# Patient Record
Sex: FEMALE | Race: WHITE | NOT HISPANIC OR LATINO | Employment: FULL TIME | URBAN - METROPOLITAN AREA
[De-identification: names, ages, dates, MRNs, and addresses within clinical notes are randomized per-mention and may not be internally consistent; named-entity substitution may affect disease eponyms.]

---

## 2017-04-17 ENCOUNTER — MYC MEDICAL ADVICE (OUTPATIENT)
Dept: FAMILY MEDICINE | Facility: CLINIC | Age: 52
End: 2017-04-17

## 2017-04-19 ENCOUNTER — TELEPHONE (OUTPATIENT)
Dept: FAMILY MEDICINE | Facility: CLINIC | Age: 52
End: 2017-04-19

## 2017-04-19 ENCOUNTER — OFFICE VISIT (OUTPATIENT)
Dept: FAMILY MEDICINE | Facility: CLINIC | Age: 52
End: 2017-04-19
Payer: COMMERCIAL

## 2017-04-19 ENCOUNTER — OFFICE VISIT (OUTPATIENT)
Dept: DERMATOLOGY | Facility: CLINIC | Age: 52
End: 2017-04-19

## 2017-04-19 ENCOUNTER — TRANSFERRED RECORDS (OUTPATIENT)
Dept: HEALTH INFORMATION MANAGEMENT | Facility: CLINIC | Age: 52
End: 2017-04-19

## 2017-04-19 VITALS
DIASTOLIC BLOOD PRESSURE: 80 MMHG | TEMPERATURE: 98.5 F | HEIGHT: 66 IN | HEART RATE: 72 BPM | SYSTOLIC BLOOD PRESSURE: 126 MMHG | BODY MASS INDEX: 21.86 KG/M2 | WEIGHT: 136 LBS

## 2017-04-19 DIAGNOSIS — L57.0 AK (ACTINIC KERATOSIS): Primary | ICD-10-CM

## 2017-04-19 DIAGNOSIS — Z13.6 CARDIOVASCULAR SCREENING; LDL GOAL LESS THAN 160: ICD-10-CM

## 2017-04-19 DIAGNOSIS — L70.0 ACNE VULGARIS: ICD-10-CM

## 2017-04-19 DIAGNOSIS — G43.809 OTHER TYPE OF MIGRAINE: ICD-10-CM

## 2017-04-19 DIAGNOSIS — Z11.59 NEED FOR HEPATITIS C SCREENING TEST: ICD-10-CM

## 2017-04-19 DIAGNOSIS — L81.2 FRECKLES: ICD-10-CM

## 2017-04-19 DIAGNOSIS — N95.1 MENOPAUSAL SYNDROME (HOT FLASHES): ICD-10-CM

## 2017-04-19 DIAGNOSIS — K64.9 HEMORRHOIDS, UNSPECIFIED HEMORRHOID TYPE: ICD-10-CM

## 2017-04-19 DIAGNOSIS — K21.9 GASTROESOPHAGEAL REFLUX DISEASE WITHOUT ESOPHAGITIS: ICD-10-CM

## 2017-04-19 DIAGNOSIS — Z00.00 ROUTINE HISTORY AND PHYSICAL EXAMINATION OF ADULT: Primary | ICD-10-CM

## 2017-04-19 LAB
ANION GAP SERPL CALCULATED.3IONS-SCNC: 7 MMOL/L (ref 3–14)
BUN SERPL-MCNC: 11 MG/DL (ref 7–30)
CALCIUM SERPL-MCNC: 9.2 MG/DL (ref 8.5–10.1)
CHLORIDE SERPL-SCNC: 108 MMOL/L (ref 94–109)
CHOLEST SERPL-MCNC: 247 MG/DL
CO2 SERPL-SCNC: 27 MMOL/L (ref 20–32)
CREAT SERPL-MCNC: 0.66 MG/DL (ref 0.52–1.04)
GFR SERPL CREATININE-BSD FRML MDRD: NORMAL ML/MIN/1.7M2
GLUCOSE SERPL-MCNC: 87 MG/DL (ref 70–99)
HCV AB SERPL QL IA: NORMAL
HDLC SERPL-MCNC: 69 MG/DL
HGB BLD-MCNC: 13.4 G/DL (ref 11.7–15.7)
LDLC SERPL CALC-MCNC: 158 MG/DL
NONHDLC SERPL-MCNC: 178 MG/DL
POTASSIUM SERPL-SCNC: 4.6 MMOL/L (ref 3.4–5.3)
SODIUM SERPL-SCNC: 142 MMOL/L (ref 133–144)
TRIGL SERPL-MCNC: 101 MG/DL

## 2017-04-19 PROCEDURE — 80061 LIPID PANEL: CPT | Performed by: FAMILY MEDICINE

## 2017-04-19 PROCEDURE — 86580 TB INTRADERMAL TEST: CPT | Performed by: FAMILY MEDICINE

## 2017-04-19 PROCEDURE — 85018 HEMOGLOBIN: CPT | Performed by: FAMILY MEDICINE

## 2017-04-19 PROCEDURE — 99396 PREV VISIT EST AGE 40-64: CPT | Performed by: FAMILY MEDICINE

## 2017-04-19 PROCEDURE — 36415 COLL VENOUS BLD VENIPUNCTURE: CPT | Performed by: FAMILY MEDICINE

## 2017-04-19 PROCEDURE — 80048 BASIC METABOLIC PNL TOTAL CA: CPT | Performed by: FAMILY MEDICINE

## 2017-04-19 PROCEDURE — 86803 HEPATITIS C AB TEST: CPT | Performed by: FAMILY MEDICINE

## 2017-04-19 RX ORDER — BENZOYL PEROXIDE 5 G/100G
GEL TOPICAL DAILY
Qty: 60 G | Refills: 2 | Status: SHIPPED | OUTPATIENT
Start: 2017-04-19 | End: 2018-04-17

## 2017-04-19 RX ORDER — OMEPRAZOLE 40 MG/1
40 CAPSULE, DELAYED RELEASE ORAL DAILY
Qty: 90 CAPSULE | Refills: 3 | Status: SHIPPED | OUTPATIENT
Start: 2017-04-19 | End: 2018-04-17

## 2017-04-19 RX ORDER — SUMATRIPTAN 25 MG/1
25 TABLET, FILM COATED ORAL
Qty: 9 TABLET | Refills: 5 | Status: SHIPPED | OUTPATIENT
Start: 2017-04-19 | End: 2018-02-18

## 2017-04-19 RX ORDER — DESONIDE 0.5 MG/G
OINTMENT TOPICAL
Qty: 30 G | Refills: 1 | Status: SHIPPED | OUTPATIENT
Start: 2017-04-19 | End: 2018-04-17

## 2017-04-19 RX ORDER — CIMETIDINE 400 MG
400 TABLET ORAL AT BEDTIME
Qty: 30 TABLET | Refills: 11 | Status: SHIPPED | OUTPATIENT
Start: 2017-04-19 | End: 2018-04-17

## 2017-04-19 ASSESSMENT — PAIN SCALES - GENERAL
PAINLEVEL: NO PAIN (0)
PAINLEVEL: NO PAIN (0)

## 2017-04-19 NOTE — PROGRESS NOTES
DERMATOLOGY PROBLEM LIST:   1.  Actinic keratoses.   2.  Family history of melanoma.      CHIEF COMPLAINT:  Skin check.      HISTORY OF PRESENT ILLNESS:  The patient is a 52-year-old female who returns today for her yearly skin check.  She has a history of actinic keratoses and sun damage.  She has responded well to cryotherapy in the past.  She has just in the past month returned from a tropical vacation in [place].  She did wear sunscreen but does report she does spend time at the beach.  Her father does have a history of melanoma on the face and has had what sounds like a nonmelanoma skin cancer of the back.  He follows with Dr. Aguilar.  At the last visit, she had 4 actinic keratoses treated with cryotherapy but no field treatment.      PHYSICAL EXAMINATION:  The patient is a well-appearing female in no acute distress.  Please note the nursing note for vital signs.  Face, trunk, upper and lower extremities including buttocks are examined today.  She is tanned but baseline Burt type I skin.  She has a rough, gritty papule on the left forehead.  Otherwise, she has multiple light brown macules  scattered throughout the body.      ASSESSMENT AND PLAN:   1.  Actinic keratoses.  She continues to improve each time we see her.  This visit, she only had 1 AK of the forehead and it was treated with cryotherapy.   2.  Nevi.  No atypia.   3.  Multiple freckles and lentigines.  Continue good sun protection.     I will plan to see her back in 1 year.

## 2017-04-19 NOTE — NURSING NOTE
"Chief Complaint   Patient presents with     Physical       Initial /80 (BP Location: Right arm, Cuff Size: Adult Regular)  Pulse 72  Temp 98.5  F (36.9  C) (Oral)  Ht 5' 5.5\" (1.664 m)  Wt 136 lb (61.7 kg)  BMI 22.29 kg/m2 Estimated body mass index is 22.29 kg/(m^2) as calculated from the following:    Height as of this encounter: 5' 5.5\" (1.664 m).    Weight as of this encounter: 136 lb (61.7 kg).  Medication Reconciliation: complete     Ava Sharp MA     "

## 2017-04-19 NOTE — TELEPHONE ENCOUNTER
Reason for Call:  Other - Prescription Clarification    Detailed comments: [facility] called and needs clarification on the prescription for: nitroglycerin (NITROGLYCERIN) 2 % OINT ointment. The directions stated to mix with 9 parts petroleum jelly. They are wondering if he wants the pharmacy to make a compound of this or if this is directions for the patient. Please call back to discuss.    Phone Number Patient can be reached at: Other phone number:  [phone]    Best Time: As soon as possible    Can we leave a detailed message on this number? YES    Call taken on 4/19/2017 at 2:36 PM by Sravanthi Panda

## 2017-04-19 NOTE — PATIENT INSTRUCTIONS
Preventive Health Recommendations  Female Ages 50 - 64    Yearly exam: See your health care provider every year in order to  o Review health changes.   o Discuss preventive care.    o Review your medicines if your doctor has prescribed any.      Get a Pap test every three years (unless you have an abnormal result and your provider advises testing more often).    If you get Pap tests with HPV test, you only need to test every 5 years, unless you have an abnormal result.     You do not need a Pap test if your uterus was removed (hysterectomy) and you have not had cancer.    You should be tested each year for STDs (sexually transmitted diseases) if you're at risk.     Have a mammogram every 1 to 2 years.    Have a colonoscopy at age 50, or have a yearly FIT test (stool test). These exams screen for colon cancer.      Have a cholesterol test every 5 years, or more often if advised.    Have a diabetes test (fasting glucose) every three years. If you are at risk for diabetes, you should have this test more often.     If you are at risk for osteoporosis (brittle bone disease), think about having a bone density scan (DEXA).    Shots: Get a flu shot each year. Get a tetanus shot every 10 years.    Nutrition:     Eat at least 5 servings of fruits and vegetables each day.    Eat whole-grain bread, whole-wheat pasta and brown rice instead of white grains and rice.    Talk to your provider about Calcium and Vitamin D.     Lifestyle    Exercise at least 150 minutes a week (30 minutes a day, 5 days a week). This will help you control your weight and prevent disease.    Limit alcohol to one drink per day.    No smoking.     Wear sunscreen to prevent skin cancer.     See your dentist every six months for an exam and cleaning.    See your eye doctor every 1 to 2 years.  Orders Placed This Encounter     TB INTRADERMAL TEST     Hepatitis C Screen Reflex to HCV RNA Quant and Genotype     The Hepatitis C Screen testing will be used for  patients age 45-65 following the CDC recommendations. HEP C screening testing can also be ordered for any patient for which it is clinically recommended.     Basic metabolic panel     Hemoglobin     Last Lab Result: Hemoglobin (g/dL)       Date                     Value                 04/19/2016               13.1             ----------     Lipid Profile with reflex to direct LDL     Last Lab Result: LDL Cholesterol Calculated (mg/dL)       Date                     Value                 04/19/2016               153 (H)          ----------     omeprazole (PRILOSEC) 40 MG capsule     Sig: Take 1 capsule (40 mg) by mouth daily     Dispense:  90 capsule     Refill:  3     cimetidine (TAGAMET) 400 MG tablet     Sig: Take 1 tablet (400 mg) by mouth At Bedtime     Dispense:  30 tablet     Refill:  11     desonide (DESOWEN) 0.05 % ointment     Sig: Apply thin layer to affected area BID.     Dispense:  30 g     Refill:  1     nitroglycerin (NITROGLYCERIN) 2 % OINT ointment     Sig: 3-4 times a day. Use with 9 parts petroleum and apply to hemmrhoids.     Dispense:  15 g     Refill:  3     estrogens, conjugated, (PREMARIN) 0.625 MG tablet     Sig: Take 1 tablet (0.625 mg) by mouth daily     Dispense:  90 tablet     Refill:  3     SUMAtriptan (IMITREX) 25 MG tablet     Sig: Take 1 tablet (25 mg) by mouth at onset of headache for migraine May repeat in 2 hours if needed: max 2/day     Dispense:  9 tablet     Refill:  5     benzoyl peroxide 5 % topical gel     Sig: Apply topically daily     Dispense:  60 g     Refill:  2

## 2017-04-19 NOTE — LETTER
4/19/2017       RE: Amanda Sifuentes  [address]  [place] [address]     Dear Colleague,    Thank you for referring your patient, Amanda Sifuentes, to the [facility] DERMATOLOGY at [facility]. Please see a copy of my visit note below.    DERMATOLOGY PROBLEM LIST:   1.  Actinic keratoses.   2.  Family history of melanoma.      CHIEF COMPLAINT:  Skin check.      HISTORY OF PRESENT ILLNESS:  The patient is a 52-year-old female who returns today for her yearly skin check.  She has a history of actinic keratoses and sun damage.  She has responded well to cryotherapy in the past.  She has just in the past month returned from a tropical vacation in [place].  She did wear sunscreen but does report she does spend time at the beach.  Her father does have a history of melanoma on the face and has had what sounds like a nonmelanoma skin cancer of the back.  He follows with Dr. Aguilar.  At the last visit, she had 4 actinic keratoses treated with cryotherapy but no field treatment.      PHYSICAL EXAMINATION:  The patient is a well-appearing female in no acute distress.  Please note the nursing note for vital signs.  Face, trunk, upper and lower extremities including buttocks are examined today.  She is tanned but baseline Burt type I skin.  She has a rough, gritty papule on the left forehead.  Otherwise, she has multiple light brown macules  scattered throughout the body.      ASSESSMENT AND PLAN:   1.  Actinic keratoses.  She continues to improve each time we see her.  This visit, she only had 1 AK of the forehead and it was treated with cryotherapy.   2.  Nevi.  No atypia.   3.  Multiple freckles and lentigines.  Continue good sun protection.     I will plan to see her back in 1 year.           Again, thank you for allowing me to participate in the care of your patient.      Sincerely,    Jaqueline Del Cid MD

## 2017-04-19 NOTE — NURSING NOTE
The patient is asked the following questions today and these are her answers:    -Have you had a mantoux administered in the past 30 days?    No  -Have you had a previous positive Mantoux.  No  -Have you received BCG in the past.  No  -Have you had a live vaccine  (MMR, Varicella, OPV, Yellow Fever) in the last 6 weeks.  No  -Have you had and active  viral or bacterial infection in the past 6 weeks.  No  -Have you received corticosteroids or immunosuppressive agents in the past 6 weeks.  No  -Have you been diagnosed with HIV?  No  -Do you have a maglinancy?  No   Alysha Marley, [facility]

## 2017-04-19 NOTE — MR AVS SNAPSHOT
After Visit Summary   4/19/2017    Amanda Sifuentes    MRN: [id number]           Patient Information     Date Of Birth          1965        Visit Information        Provider Department      4/19/2017 10:15 AM Jaqueline Del Cid MD [facility] Dermatology        Today's Diagnoses     AK (actinic keratosis)    -  1    Freckles          Care Instructions    Cryotherapy    What is it?    Use of a very cold liquid, such as liquid nitrogen, to freeze and destroy abnormal skin cells that need to be removed    What should I expect?    Tenderness and redness    A small blister that might grow and fill with dark purple blood. There may be crusting.    More than one treatment may be needed if the lesions do not go away.    How do I care for the treated area?    Gently wash the area with your hands when bathing.    Use a thin layer of Vaselin to help with healing. You may use a Band-Aid.     The area should heal within 7-10 days and may leave behind a pink or lighter color.     Do not use an antibiotic or Neosporin ointment.     You may take acetaminophen (Tylenol) for pain.     Call your Doctor if you have:    Severe pain    Signs of infection (warmth, redness, cloudy yellow drainage, and or a bad smell)    Questions or concerns    Who should I call with questions?       [facility]: [phone]       [facility]: [phone]       For urgent needs outside of business hours call the [facility] at [phone]        and ask for the dermatology resident on call        Follow-ups after your visit        Follow-up notes from your care team     Return in about 1 year (around 4/19/2018).      Your next 10 appointments already scheduled     Apr 21, 2017  9:00 AM CDT   Nurse Only with [place] RN   [facility]    [address] [facility] [facility] [address] [phone]            May 22,  2017  1:00 PM CDT   (Arrive by 12:30 PM)   RETURN ENDOCRINE with Kiersten Tenorio MD   [facility] ([facility] [place])    [address] [address] [phone]              Who to contact     Please call your clinic at [phone] to:    Ask questions about your health    Make or cancel appointments    Discuss your medicines    Learn about your test results    Speak to your doctor   If you have compliments or concerns about an experience at your clinic, or if you wish to file a complaint, please contact [facility] Patient Relations at [phone] or email us at [email or web address]         Additional Information About Your Visit        [employer] Information     [employer] gives you secure access to your electronic health record. If you see a primary care provider, you can also send messages to your care team and make appointments. If you have questions, please call your primary care clinic.  If you do not have a primary care provider, please call [phone] and they will assist you.      [employer] is an electronic gateway that provides easy, online access to your medical records. With [employer], you can request a clinic appointment, read your test results, renew a prescription or communicate with your care team.     To access your existing account, please contact your [facility] or call [phone] for assistance.        Care EveryWhere ID     This is your Care EveryWhere ID. This could be used by other organizations to access your [place] medical records  [id number]         Blood Pressure from Last 3 Encounters:   04/19/17 126/80   11/22/16 127/85   10/31/16 116/70    Weight from Last 3 Encounters:   04/19/17 61.7 kg (136 lb)   11/22/16 66.8 kg (147 lb 3.2 oz)   10/31/16 65.1 kg (143 lb 8 oz)              We Performed the Following     DESTRUCT PREMALIGNANT LESION, FIRST           Today's Medication Changes          These changes are accurate as of: 4/19/17 11:59 PM.  If you have any questions, ask your nurse or doctor.               These medicines have changed or have updated prescriptions.        Dose/Directions    SUMAtriptan 25 MG tablet   Commonly known as:  IMITREX   This may have changed:  additional instructions   Used for:  Other type of migraine   Changed by:  Rocco Carmona MD        Dose:  25 mg   Take 1 tablet (25 mg) by mouth at onset of headache for migraine May repeat in 2 hours if needed: max 2/day   Quantity:  9 tablet   Refills:  5            Where to get your medicines      These medications were sent to [place] [address] [facility] - [place], [place] - [address]  [address], [facility] [address]     Phone:  [phone]     benzoyl peroxide 5 % topical gel    cimetidine 400 MG tablet    desonide 0.05 % ointment    estrogens (conjugated) 0.625 MG tablet    nitroglycerin 2 % Oint ointment    omeprazole 40 MG capsule    SUMAtriptan 25 MG tablet                Primary Care Provider Office Phone # Fax #    Noreen Ventura [phone]       [address] [address]        Thank you!     Thank you for choosing [facility] DERMATOLOGY  for your care. Our goal is always to provide you with excellent care. Hearing back from our patients is one way we can continue to improve our services. Please take a few minutes to complete the written survey that you may receive in the mail after your visit with us. Thank you!             Your Updated Medication List - Protect others around you: Learn how to safely use, store and throw away your medicines at www.disposemymeds.org.          This list is accurate as of: 4/19/17 11:59 PM.  Always use your most recent med list.                   Brand Name Dispense Instructions for use    ascorbic acid 1000 MG Tabs    vitamin C     Take 1 tablet by mouth daily.       benzoyl  peroxide 5 % topical gel     60 g    Apply topically daily       Calcium Carb-Cholecalciferol 600-800 MG-UNIT Tabs      Dose unknown       cimetidine 400 MG tablet    TAGAMET    30 tablet    Take 1 tablet (400 mg) by mouth At Bedtime       desonide 0.05 % ointment    DESOWEN    30 g    Apply thin layer to affected area BID.       estrogens (conjugated) 0.625 MG tablet    PREMARIN    90 tablet    Take 1 tablet (0.625 mg) by mouth daily       GLUCOSAMINE CHONDROITIN Tabs     300 tab    TAKE 3 TABLETS (1500MG-GLUCOSAMINE) DAILY       Multi-vitamin Tabs tablet   Generic drug:  multivitamin, therapeutic with minerals     35    1 TABLET DAILY       nitroglycerin 2 % Oint ointment    nitroGLYCERIN    15 g    3-4 times a day. Use with 9 parts petroleum and apply to hemmrhoids.       omega-3 fatty acids 1200 MG capsule      Take 1 capsule by mouth daily.       omeprazole 40 MG capsule    priLOSEC    90 capsule    Take 1 capsule (40 mg) by mouth daily       SUMAtriptan 25 MG tablet    IMITREX    9 tablet    Take 1 tablet (25 mg) by mouth at onset of headache for migraine May repeat in 2 hours if needed: max 2/day       vitamin B complex with vitamin C Tabs tablet      Take 1 tablet by mouth daily       vitamin E 400 UNIT capsule      Take 1 capsule by mouth daily.

## 2017-04-19 NOTE — MR AVS SNAPSHOT
After Visit Summary   4/19/2017    Amanda Sifuentes    MRN: [id number]           Patient Information     Date Of Birth          1965        Visit Information        Provider Department      4/19/2017 8:00 AM Rocco Carmona MD [facility]        Today's Diagnoses     Routine history and physical examination of adult    -  1    CARDIOVASCULAR SCREENING; LDL GOAL LESS THAN 160        Need for hepatitis C screening test        Gastroesophageal reflux disease without esophagitis        Other type of migraine        Hemorrhoids, unspecified hemorrhoid type        Menopausal syndrome (hot flashes)        Acne vulgaris          Care Instructions      Preventive Health Recommendations  Female Ages 50 - 64    Yearly exam: See your health care provider every year in order to  o Review health changes.   o Discuss preventive care.    o Review your medicines if your doctor has prescribed any.      Get a Pap test every three years (unless you have an abnormal result and your provider advises testing more often).    If you get Pap tests with HPV test, you only need to test every 5 years, unless you have an abnormal result.     You do not need a Pap test if your uterus was removed (hysterectomy) and you have not had cancer.    You should be tested each year for STDs (sexually transmitted diseases) if you're at risk.     Have a mammogram every 1 to 2 years.    Have a colonoscopy at age 50, or have a yearly FIT test (stool test). These exams screen for colon cancer.      Have a cholesterol test every 5 years, or more often if advised.    Have a diabetes test (fasting glucose) every three years. If you are at risk for diabetes, you should have this test more often.     If you are at risk for osteoporosis (brittle bone disease), think about having a bone density scan (DEXA).    Shots: Get a flu shot each year. Get a tetanus shot every 10 years.    Nutrition:     Eat at least 5 servings of  fruits and vegetables each day.    Eat whole-grain bread, whole-wheat pasta and brown rice instead of white grains and rice.    Talk to your provider about Calcium and Vitamin D.     Lifestyle    Exercise at least 150 minutes a week (30 minutes a day, 5 days a week). This will help you control your weight and prevent disease.    Limit alcohol to one drink per day.    No smoking.     Wear sunscreen to prevent skin cancer.     See your dentist every six months for an exam and cleaning.    See your eye doctor every 1 to 2 years.          Follow-ups after your visit        Your next 10 appointments already scheduled     Apr 19, 2017 10:15 AM CDT   (Arrive by 10:00 AM)   Return Visit with Jaqueline Del Cid MD   [facility] ([facility])    [address] [place] [id number] [phone]            Apr 21, 2017  9:00 AM CDT   [facility] with [place] [facility]    [address] [place] [id number] [phone]            May 22, 2017  1:00 PM CDT   (Arrive by 12:30 PM)   RETURN ENDOCRINE with Kiersten Tenorio MD   [facility] ([facility])    [address] [place] [address] [phone]              Who to contact     If you have questions or need follow up information about today's clinic visit or your schedule please contact [facility] directly at [phone].  Normal or non-critical lab and imaging results will be communicated to you by MyChart, letter or phone within 4 business days after the clinic has received the results. If you do not hear from us within 7 days, please contact the clinic through Mfusehart or phone. If you have a critical or abnormal lab result, we will notify you by phone as soon as possible.  Submit refill requests through [employer] or call your pharmacy and they will forward the refill  "request to us. Please allow 3 business days for your refill to be completed.          Additional Information About Your Visit        Wappwolfhart Information     [employer] gives you secure access to your electronic health record. If you see a primary care provider, you can also send messages to your care team and make appointments. If you have questions, please call your primary care clinic.  If you do not have a primary care provider, please call [phone] and they will assist you.        Care EveryWhere ID     This is your Care EveryWhere ID. This could be used by other organizations to access your [facility] medical records  [id number]        Your Vitals Were     Pulse Temperature Height BMI (Body Mass Index)          72 98.5  F (36.9  C) (Oral) 5' 5.5\" (1.664 m) 22.29 kg/m2         Blood Pressure from Last 3 Encounters:   04/19/17 126/80   11/22/16 127/85   10/31/16 116/70    Weight from Last 3 Encounters:   04/19/17 136 lb (61.7 kg)   11/22/16 147 lb 3.2 oz (66.8 kg)   10/31/16 143 lb 8 oz (65.1 kg)              We Performed the Following     Basic metabolic panel     Hemoglobin     Hepatitis C Screen Reflex to HCV RNA Quant and Genotype     Lipid Profile with reflex to direct LDL     TB INTRADERMAL TEST          Today's Medication Changes          These changes are accurate as of: 4/19/17  8:38 AM.  If you have any questions, ask your nurse or doctor.               These medicines have changed or have updated prescriptions.        Dose/Directions    SUMAtriptan 25 MG tablet   Commonly known as:  IMITREX   This may have changed:  additional instructions   Used for:  Other type of migraine   Changed by:  Rocco Carmona MD        Dose:  25 mg   Take 1 tablet (25 mg) by mouth at onset of headache for migraine May repeat in 2 hours if needed: max 2/day   Quantity:  9 tablet   Refills:  5            Where to get your medicines      These medications were sent to [place] [address] [place] [address] [place]  [address] [facility] [place] [address]     Phone:  [phone]     benzoyl peroxide 5 % topical gel    cimetidine 400 MG tablet    desonide 0.05 % ointment    estrogens (conjugated) 0.625 MG tablet    nitroglycerin 2 % Oint ointment    omeprazole 40 MG capsule    SUMAtriptan 25 MG tablet                Primary Care Provider Office Phone # Fax #    Noreen Ventura [phone]       [facility] [address] [facility] [address]        Thank you!     Thank you for choosing [facility]  for your care. Our goal is always to provide you with excellent care. Hearing back from our patients is one way we can continue to improve our services. Please take a few minutes to complete the written survey that you may receive in the mail after your visit with us. Thank you!             Your Updated Medication List - Protect others around you: Learn how to safely use, store and throw away your medicines at www.disposemymeds.org.          This list is accurate as of: 4/19/17  8:38 AM.  Always use your most recent med list.                   Brand Name Dispense Instructions for use    ascorbic acid 1000 MG Tabs    vitamin C     Take 1 tablet by mouth daily.       benzoyl peroxide 5 % topical gel     60 g    Apply topically daily       Calcium Carb-Cholecalciferol 600-800 MG-UNIT Tabs      Dose unknown       cimetidine 400 MG tablet    TAGAMET    30 tablet    Take 1 tablet (400 mg) by mouth At Bedtime       desonide 0.05 % ointment    DESOWEN    30 g    Apply thin layer to affected area BID.       estrogens (conjugated) 0.625 MG tablet    PREMARIN    90 tablet    Take 1 tablet (0.625 mg) by mouth daily       GLUCOSAMINE CHONDROITIN Tabs     300 tab    TAKE 3 TABLETS (1500MG-GLUCOSAMINE) DAILY       Multi-vitamin Tabs tablet   Generic drug:  multivitamin, therapeutic with minerals     35    1 TABLET DAILY       nitroglycerin 2 % Oint ointment    nitroGLYCERIN    15 g     3-4 times a day. Use with 9 parts petroleum and apply to hemmrhoids.       omega-3 fatty acids 1200 MG capsule      Take 1 capsule by mouth daily.       omeprazole 40 MG capsule    priLOSEC    90 capsule    Take 1 capsule (40 mg) by mouth daily       SUMAtriptan 25 MG tablet    IMITREX    9 tablet    Take 1 tablet (25 mg) by mouth at onset of headache for migraine May repeat in 2 hours if needed: max 2/day       vitamin B complex with vitamin C Tabs tablet      Take 1 tablet by mouth daily       vitamin E 400 UNIT capsule      Take 1 capsule by mouth daily.

## 2017-04-19 NOTE — PROGRESS NOTES
SUBJECTIVE:     CC: Amanda Sifuentes is an 52 year old woman who presents for preventive health visit.     Heterogeneous thyroid. Reviewed notes from Endo dated 11/22/2016. Patient has a history of heterogeneous thyroid secondary to radiation exposure when she was a [employer] . She has a posterior to right thyroid cyst, which was noted to be enlarged at that time (believed to be extrathyroidal) and was recommended fine needle aspiration biopsy. Additionally, she has a history of elevated vitamin D in the past - levels were within normal limits (63) on 11/22/2016. She continues care with Dr. Tenorio.     Mammogram. History of heterogeneously dense breasts. Mammogram performed in 2016 showed no significant changes from previous mammograms.   Patient also does a monthly breast exam and hasn't noticed any changes or discharge. She has a mammogram scheduled for this year.    Colonoscopy. Colonoscopy done in 2013 showed a normal colon.     Acne. Patient follows up with Dr. Abdalla on a yearly interval. She denies any skin changes or mole changes.     GERD. Patient taking omeprazole once daily for symptomatic relief. She was instructed to continue medication as long as she is having symptoms. She did have an upper GI endoscopy in 2010 which showed a hiatal hernia.     Labs. LDL levels noted to be borderline high (153) in conjunction with good levels of HDL (68) in 2016. Patient states that she needs BMP, Hemoglobin, lipid, and mantoux test for work. She has never had a positive mantoux test, however [place] of the [place].    Past/recent records reviewed and discussed for -- medications and immunizations.      Healthy Habits:    Do you get at least three servings of calcium containing foods daily (dairy, green leafy vegetables, etc.)? yes    Amount of exercise or daily activities, outside of work: 4 day(s) per week    Problems taking medications regularly No    Medication side effects: No    Have  you had an eye exam in the past two years? yes    Do you see a dentist twice per year? yes    Do you have sleep apnea, excessive snoring or daytime drowsiness?no        Today's PHQ-2 Score: 0  PHQ-2 ( 1999 [employer]) 11/23/2015 2/9/2015   Q1: Little interest or pleasure in doing things 0 0   Q2: Feeling down, depressed or hopeless 0 0   PHQ-2 Score 0 0       Abuse: Current or Past(Physical, Sexual or Emotional)- No  Do you feel safe in your environment - Yes    Social History   Substance Use Topics     Smoking status: Never Smoker     Smokeless tobacco: Never Used     Alcohol use Yes      Comment: 3 drinks per wek      The patient does not drink >3 drinks per day nor >7 drinks per week.    Recent Labs   Lab Test  04/19/16   0759  02/09/15   0759  12/17/13   0843   CHOL  237*  244*  221*   HDL  68  70  65   LDL  153*  152*  131*   TRIG  80  112  126   CHOLHDLRATIO   --   3.5  3.4   NHDL  169*   --    --        Reviewed orders with patient.  Reviewed health maintenance and updated orders accordingly - Yes    Pertinent mammograms are reviewed under the imaging tab.  History of abnormal Pap smear: NO - age 30- 65 PAP every 5 years with negative HPV co-testing recommended    Reviewed and updated as needed this visit by clinical staff         Reviewed and updated as needed this visit by Provider          Problem list, Medication list, Allergies, and Medical/Social/Surgical histories reviewed in [facility] and updated as appropriate.    ROS:  C: NEGATIVE for fever, chills, change in weight  I: NEGATIVE for worrisome rashes, moles or lesions  E: NEGATIVE for vision changes or irritation  ENT: NEGATIVE for ear, mouth and throat problems  R: NEGATIVE for significant cough or SOB  B: NEGATIVE for masses, tenderness or discharge  CV: NEGATIVE for chest pain, palpitations or peripheral edema  GI: NEGATIVE for nausea, abdominal pain, heartburn, or change in bowel habits  : NEGATIVE for unusual urinary or vaginal symptoms. No vaginal  "bleeding.  M: NEGATIVE for significant arthralgias or myalgia  N: NEGATIVE for weakness, dizziness or paresthesias  P: NEGATIVE for changes in mood or affect     This document serves as a record of the services and decisions personally performed and made by Caio Carmona MD. It was created on their behalf by Aroldo Dela Cruz, a trained medical scribe. The creation of this document is based the provider's statements to the medical scribe.  Aroldo Dela Cruz April 19, 2017 8:08 AM         OBJECTIVE:     /80 (BP Location: Right arm, Cuff Size: Adult Regular)  Pulse 72  Temp 98.5  F (36.9  C) (Oral)  Ht 1.664 m (5' 5.5\")  Wt 61.7 kg (136 lb)  BMI 22.29 kg/m2  EXAM:  GENERAL: healthy, alert and no distress  EYES: Eyes grossly normal to inspection, PERRL and conjunctivae and sclerae normal  HENT: ear canals and TM's normal, nose and mouth without ulcers or lesions  NECK: no adenopathy, no asymmetry, masses, or scars and thyroid normal to palpation  RESP: lungs clear to auscultation - no rales, rhonchi or wheezes  CV: regular rate and rhythm, normal S1 S2, no S3 or S4, no murmur, click or rub, no peripheral edema and peripheral pulses strong  ABDOMEN: soft, nontender, no hepatosplenomegaly, no masses and bowel sounds normal  MS: no gross musculoskeletal defects noted, no edema  SKIN: no suspicious lesions or rashes  NEURO: Normal strength and tone, mentation intact and speech normal  PSYCH: mentation appears normal, affect normal/bright  LYMPH: no cervical, supraclavicular, axillary, or inguinal adenopathy    Breast exam and pelvic exam not performed    ASSESSMENT/PLAN:     (Z00.00) Routine history and physical examination of adult  (primary encounter diagnosis)  Comment: patient doing well. Patient needs blood workup for work including a mantoux test.   Plan: Basic metabolic panel, Hemoglobin, Lipid         Profile with reflex to direct LDL, TB         INTRADERMAL TEST        Follow up, pending results    (Z13.6) " CARDIOVASCULAR SCREENING; LDL GOAL LESS THAN 160  Comment: LDL level noted to be borderline high last time it was checked (2016).   Plan: Lipid Profile with reflex to direct LDL        Follow up, pending results    (Z11.59) Need for hepatitis C screening test  Comment: recommendations were discussed in accordance with general guidelines  Plan: Hepatitis C Screen Reflex to HCV RNA Quant and         Genotype        Follow up, pending results    (K21.9) Gastroesophageal reflux disease without esophagitis  Comment: upper GI endoscopy in 2010 showed hiatal hernia. I did expound upon some of the med's (omeprazole) side effects and that she should come back if she develops diarrhea.    Plan: omeprazole (PRILOSEC) 40 MG capsule        Continue present medications, medications refilled    (G43.809) Other type of migraine  Comment: stable  Plan: cimetidine (TAGAMET) 400 MG tablet, SUMAtriptan        (IMITREX) 25 MG tablet        -continue present medications, medications refilled    (K64.9) Hemorrhoids, unspecified hemorrhoid type  Comment: occasional attacks but tolerating medication well  Plan: nitroglycerin (NITROGLYCERIN) 2 % OINT ointment        Continue present medications, medications refilled    (N95.1) Menopausal syndrome (hot flashes)  Comment: controlled.  Plan: estrogens, conjugated, (PREMARIN) 0.625 MG         tablet        Continue present medications, medications refilled    (L70.0) Acne vulgaris  Comment: Patient follows up with Dr. Abdalla on a yearly interval.  Plan: desonide (DESOWEN) 0.05 % ointment, benzoyl         peroxide 5 % topical gel        -Continue care with Dr. Abdalla     Patient Instructions     Preventive Health Recommendations  Female Ages 50 - 64    Yearly exam: See your health care provider every year in order to  o Review health changes.   o Discuss preventive care.    o Review your medicines if your doctor has prescribed any.      Get a Pap test every three years (unless you have an abnormal  result and your provider advises testing more often).    If you get Pap tests with HPV test, you only need to test every 5 years, unless you have an abnormal result.     You do not need a Pap test if your uterus was removed (hysterectomy) and you have not had cancer.    You should be tested each year for STDs (sexually transmitted diseases) if you're at risk.     Have a mammogram every 1 to 2 years.    Have a colonoscopy at age 50, or have a yearly FIT test (stool test). These exams screen for colon cancer.      Have a cholesterol test every 5 years, or more often if advised.    Have a diabetes test (fasting glucose) every three years. If you are at risk for diabetes, you should have this test more often.     If you are at risk for osteoporosis (brittle bone disease), think about having a bone density scan (DEXA).    Shots: Get a flu shot each year. Get a tetanus shot every 10 years.    Nutrition:     Eat at least 5 servings of fruits and vegetables each day.    Eat whole-grain bread, whole-wheat pasta and brown rice instead of white grains and rice.    Talk to your provider about Calcium and Vitamin D.     Lifestyle    Exercise at least 150 minutes a week (30 minutes a day, 5 days a week). This will help you control your weight and prevent disease.    Limit alcohol to one drink per day.    No smoking.     Wear sunscreen to prevent skin cancer.     See your dentist every six months for an exam and cleaning.    See your eye doctor every 1 to 2 years.              COUNSELING:   Reviewed preventive health counseling, as reflected in patient instructions       Regular exercise       Healthy diet/nutrition       Consider Hep C screening for patients born between 1945 and 1965    BP Screening:   Last 3 BP Readings:    BP Readings from Last 3 Encounters:   04/19/17 126/80   11/22/16 127/85   10/31/16 116/70       The following was recommended to the patient:  Re-screen BP within a year and recommended lifestyle  "modifications     reports that she has never smoked. She has never used smokeless tobacco.  Estimated body mass index is 24.12 kg/(m^2) as calculated from the following:    Height as of 11/22/16: 5' 5.5\" (1.664 m).    Weight as of 11/22/16: 147 lb 3.2 oz (66.8 kg).       Counseling Resources:  ATP IV Guidelines  Pooled Cohorts Equation Calculator  Breast Cancer Risk Calculator  FRAX Risk Assessment  ICSI Preventive Guidelines  Dietary Guidelines for Americans, 2010  USDA's MyPlate  ASA Prophylaxis  Lung CA Screening    Rocco Carmona MD, MD  [facility]  "

## 2017-04-19 NOTE — PATIENT INSTRUCTIONS
Cryotherapy    What is it?    Use of a very cold liquid, such as liquid nitrogen, to freeze and destroy abnormal skin cells that need to be removed    What should I expect?    Tenderness and redness    A small blister that might grow and fill with dark purple blood. There may be crusting.    More than one treatment may be needed if the lesions do not go away.    How do I care for the treated area?    Gently wash the area with your hands when bathing.    Use a thin layer of Vaselin to help with healing. You may use a Band-Aid.     The area should heal within 7-10 days and may leave behind a pink or lighter color.     Do not use an antibiotic or Neosporin ointment.     You may take acetaminophen (Tylenol) for pain.     Call your Doctor if you have:    Severe pain    Signs of infection (warmth, redness, cloudy yellow drainage, and or a bad smell)    Questions or concerns    Who should I call with questions?       [facility]: [phone]       [facility]: [phone]       For urgent needs outside of business hours call the [facility] at [phone]        and ask for the dermatology resident on call

## 2017-04-19 NOTE — NURSING NOTE
"Dermatology Rooming Note    Amanda Sifuentes's goals for this visit include:   Chief Complaint   Patient presents with     Derm Problem     Amanda is here today for a skin check. She states \" no new concerns today\"           PAOLA Riggins    "

## 2017-04-20 NOTE — TELEPHONE ENCOUNTER
Patient was new to me. Please clarify with patient. I do think she mixes it herself.  It was a refill of her previous RX so they can refill how the previous was filled also    Caio Carmona MD

## 2017-04-21 ENCOUNTER — TELEPHONE (OUTPATIENT)
Dept: FAMILY MEDICINE | Facility: CLINIC | Age: 52
End: 2017-04-21

## 2017-04-21 ENCOUNTER — ALLIED HEALTH/NURSE VISIT (OUTPATIENT)
Dept: NURSING | Facility: CLINIC | Age: 52
End: 2017-04-21
Payer: COMMERCIAL

## 2017-04-21 DIAGNOSIS — Z11.1 SCREENING EXAMINATION FOR PULMONARY TUBERCULOSIS: Primary | ICD-10-CM

## 2017-04-21 LAB
PPDINDURATION: 0 MM (ref 0–5)
PPDREDNESS: 0 MM

## 2017-04-21 PROCEDURE — 99207 ZZC NO CHARGE NURSE ONLY: CPT

## 2017-04-21 NOTE — PROGRESS NOTES
Mantoux result:  Lab Results   Component Value Date    PPDREDNESS 0 04/21/2017    PPDINDURATIO 0 04/21/2017      Patient given result for work.     Yolande Hawthorne RN

## 2017-04-21 NOTE — TELEPHONE ENCOUNTER
Attempt # 1    Called patient at home number.     Was call answered?  No.  Left message on voicemail with information to call me back.    Yolande Hawthorne RN

## 2017-04-21 NOTE — LETTER
[address] [id number] [phone]      2017      Re: Amanda Sifuentes  : 1965  [address]  [place] [address]        To Whom It May Concern,    Mantoux TB skin test placed 17 and read 17 was negative (normal).     Component Results   Component Value Flag Ref Range Units Status Resulted Lab   PPD Induration 0  0 - 5 mm Final 2017  8:54 AM Unknown   PPD Redness 0   mm Final 2017  8:54 AM      Sincerely,          Yolande Hawthorne RN

## 2017-04-21 NOTE — MR AVS SNAPSHOT
After Visit Summary   4/21/2017    Amanda Sifuentes    MRN: [id number]           Patient Information     Date Of Birth          1965        Visit Information        Provider Department      4/21/2017 9:00 AM NE RN [facility]        Today's Diagnoses     Screening examination for pulmonary tuberculosis    -  1       Follow-ups after your visit        Your next 10 appointments already scheduled     May 22, 2017  1:00 PM CDT   (Arrive by 12:30 PM)   RETURN ENDOCRINE with Kiersten Tenorio MD   [facility] ([facility])    [address] [place] [id number] [phone]              Who to contact     If you have questions or need follow up information about today's clinic visit or your schedule please contact [facility] directly at [phone].  Normal or non-critical lab and imaging results will be communicated to you by MyChart, letter or phone within 4 business days after the clinic has received the results. If you do not hear from us within 7 days, please contact the clinic through MyChart or phone. If you have a critical or abnormal lab result, we will notify you by phone as soon as possible.  Submit refill requests through [employer] or call your pharmacy and they will forward the refill request to us. Please allow 3 business days for your refill to be completed.          Additional Information About Your Visit        MyChart Information     [employer] gives you secure access to your electronic health record. If you see a primary care provider, you can also send messages to your care team and make appointments. If you have questions, please call your primary care clinic.  If you do not have a primary care provider, please call [phone] and they will assist you.        Care EveryWhere ID     This is your Care EveryWhere ID. This could be used by other organizations to access your [facility]  records  [id number]         Blood Pressure from Last 3 Encounters:   04/19/17 126/80   11/22/16 127/85   10/31/16 116/70    Weight from Last 3 Encounters:   04/19/17 136 lb (61.7 kg)   11/22/16 147 lb 3.2 oz (66.8 kg)   10/31/16 143 lb 8 oz (65.1 kg)              Today, you had the following     No orders found for display       Primary Care Provider Office Phone # Fax #    Noreen Ventura [phone]       [facility] [address] [facility] [address]        Thank you!     Thank you for choosing [facility]  for your care. Our goal is always to provide you with excellent care. Hearing back from our patients is one way we can continue to improve our services. Please take a few minutes to complete the written survey that you may receive in the mail after your visit with us. Thank you!             Your Updated Medication List - Protect others around you: Learn how to safely use, store and throw away your medicines at www.disposemymeds.org.          This list is accurate as of: 4/21/17  9:53 AM.  Always use your most recent med list.                   Brand Name Dispense Instructions for use    ascorbic acid 1000 MG Tabs    vitamin C     Take 1 tablet by mouth daily.       benzoyl peroxide 5 % topical gel     60 g    Apply topically daily       Calcium Carb-Cholecalciferol 600-800 MG-UNIT Tabs      Dose unknown       cimetidine 400 MG tablet    TAGAMET    30 tablet    Take 1 tablet (400 mg) by mouth At Bedtime       desonide 0.05 % ointment    DESOWEN    30 g    Apply thin layer to affected area BID.       estrogens (conjugated) 0.625 MG tablet    PREMARIN    90 tablet    Take 1 tablet (0.625 mg) by mouth daily       GLUCOSAMINE CHONDROITIN Tabs     300 tab    TAKE 3 TABLETS (1500MG-GLUCOSAMINE) DAILY       Multi-vitamin Tabs tablet   Generic drug:  multivitamin, therapeutic with minerals     35    1 TABLET DAILY       nitroglycerin 2 % Oint  ointment    nitroGLYCERIN    15 g    3-4 times a day. Use with 9 parts petroleum and apply to hemmrhoids.       omega-3 fatty acids 1200 MG capsule      Take 1 capsule by mouth daily.       omeprazole 40 MG capsule    priLOSEC    90 capsule    Take 1 capsule (40 mg) by mouth daily       SUMAtriptan 25 MG tablet    IMITREX    9 tablet    Take 1 tablet (25 mg) by mouth at onset of headache for migraine May repeat in 2 hours if needed: max 2/day       vitamin B complex with vitamin C Tabs tablet      Take 1 tablet by mouth daily       vitamin E 400 UNIT capsule      Take 1 capsule by mouth daily.

## 2017-04-21 NOTE — TELEPHONE ENCOUNTER
Reason for Call:  Other prescription    Detailed comments: The pharmacy is calling wanting wanting to know if the nitroglycerin (NITROGLYCERIN) 2 % OINT ointment they need to combine it or if the patient needs to do it.     Phone Number Patient can be reached at: Home number on file [phone] (home)    Best Time: Anytime     Can we leave a detailed message on this number? YES    Call taken on 4/21/2017 at 3:56 PM by Zaira Gonzalez

## 2017-04-26 NOTE — TELEPHONE ENCOUNTER
Called and spoke with [facility]-he says that if we want them to compound it they need a recipe. Called patient and left VM to call back to see where it was filled previously to see if they have recipe.    Jorge Campuzano RN

## 2017-04-26 NOTE — TELEPHONE ENCOUNTER
Pharmacy calling to check on status of message.  Patient told them that she has never mixed it up herself.  Please call to advise/clarify.

## 2017-04-26 NOTE — TELEPHONE ENCOUNTER
Patient is returning call to nurse.    Patient has been out of town since 4/21/17 and just got back last night.    Patient has always gotten the script with it mixed by the pharmacy she does not mix it herself.    Patient had talked with [facility] pharmacy on 4/21/17 and they had asked her about whether she mixed her or not and she told them the pharmacy always has mixed it for her.    Patient had talked to Puma, pharmacist in training and explained it all to him.    Please call pharmacy and let them know they need to mix it for patient -- that is what they have always done in the past.    Please call patient ([phone]) once this has been done so she knows that this has been communicated to them.    Lacey King

## 2017-04-27 NOTE — TELEPHONE ENCOUNTER
Patient returns call requesting that we call [facility]. I apologized for the delay. I called [facility] & relayed the ingredient instructions & [facility] will mix it in a couple of hours & will call her when it's done. I called patient to inform her of this & patient verbalized understanding & thanks us for our help.  Bia Marley RN

## 2017-04-27 NOTE — TELEPHONE ENCOUNTER
Amanda returned call asking that she be called on her cell phone this morning [phone]. She needs a return call before 12:00 noon today. She stated that leaving a detailed message will not work because she needs to speak with someone.

## 2017-04-28 ENCOUNTER — TELEPHONE (OUTPATIENT)
Dept: FAMILY MEDICINE | Facility: CLINIC | Age: 52
End: 2017-04-28

## 2017-04-28 NOTE — TELEPHONE ENCOUNTER
[facility] [phone]  Pharmacy is calling to clarify if they need to mix the Nitroglycerin or Does the patient mix it?  Please call [facility] pharmacy to discuss.      Christi Liz  Patient Representative

## 2017-05-22 ENCOUNTER — OFFICE VISIT (OUTPATIENT)
Dept: ENDOCRINOLOGY | Facility: CLINIC | Age: 52
End: 2017-05-22

## 2017-05-22 VITALS
SYSTOLIC BLOOD PRESSURE: 127 MMHG | BODY MASS INDEX: 23.74 KG/M2 | HEART RATE: 73 BPM | WEIGHT: 142.5 LBS | HEIGHT: 65 IN | DIASTOLIC BLOOD PRESSURE: 79 MMHG

## 2017-05-22 DIAGNOSIS — E03.9 HYPOTHYROIDISM, UNSPECIFIED TYPE: ICD-10-CM

## 2017-05-22 DIAGNOSIS — E04.1 THYROID CYST: Primary | ICD-10-CM

## 2017-05-22 ASSESSMENT — PAIN SCALES - GENERAL: PAINLEVEL: NO PAIN (0)

## 2017-05-22 NOTE — LETTER
5/22/2017       RE: Amanda Sifuentes  [address]  [place] [address]     Dear Colleague,    Thank you for referring your patient, Amadna Sifuentes, to the [facility] ENDOCRINOLOGY at [facility]. Please see a copy of my visit note below.    Assessment / Plan  1. heterogeneous thyroid (with appearance of AITD)  in patient with adult age radiation exposures.  The nodules are small and none has characteristics of great concern.      2  Posterior to Right thyroid cyst has enlarged further -  -I am more convinced today that it is intrathyroidal and just a pure  cyst.  Repeat formal US neck and thyroid.    RTC 1 year.     3 History of possible irradiation exposure causing health risks. This would have been in her 20's. This may be a small risk factor for future head and neck tumors including of the thyroid or parathyroid -as per # 1 and # 2.  Both will continue to need to be watched periodically    Kiersten Tenorio MD.    HPI   Amanda presents for follow up of nodular thyroid in the context of history of ? 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  I last saw her 11/16.     We have been following her with periodic US and FNAB of thyroid nodules.    6/12/13 she had FNAB of the dominant right (# 3) and left dominant nodules (#3), as well as another left lobe nodule.  benign on all 3 nodules ([id number])    The most recent formal thyroid US was 3/13/15. When I saw her in clinic 11/15 I noted that the Anechoic cystic mass posterior to right inferior thyroid  Was larger, then 1.2 x 1.5 x 1.9 cm (2015 was 0.8 x 1 x 1.5 cm; 2013 was 0.5 x 0.6 x 0.6 cm) .  We did parathyroid related labs as I considered the possibility the cyst was a cystic parathyroid.  The parathyroid labs were normal.      We have the following tumor marker and antibody data:  10/21/09: TP0 < 10  11/1/10: IRIS < 20, calcitonin 3  3/13/15: Tg 13.8, IRIS < 0.4, TSH   11/22/16: Tg 11.7  (concurrent 11.3), IRIS < 0.4, PTH 51, vitamin D 63  4/19/17: Ca 9.2.     ROS   No awareness of nodule or thyroid.   No swallow or voice symptoms.   Feels normal   Cardiac, respiratory, GI: negative.     Family Hx    Family History   Problem Relation Age of Onset     DIABETES Maternal Grandfather      Cancer - colorectal Maternal Grandfather      70s     Hypertension Father      Prostate Cancer Father      CANCER Father      Basal Cell Carcinoma     CEREBROVASCULAR DISEASE Maternal Grandmother      Cancer - colorectal Maternal Grandmother      70s     Cardiovascular Mother      MI in her 70's(4 stents)     No known thyroid cancer    Personal Hx   Behavioral history: No tobacco use.   Home environment: No secondhand tobacco smoke in home.   .   PMH   Past Medical History:   Diagnosis Date     Esophageal reflux      Family history of colon cancer      Globus sensation      Multiple thyroid nodules     2009, 2013 FNAB benign     Other forms of migraine, without mention of intractable migraine without mention of status migrainosus      Unspecified hemorrhoids without mention of complication      Past Surgical History:   Procedure Laterality Date     COLONOSCOPY  5/20/2013    Procedure: COLONOSCOPY;;  Surgeon: Jim Flores MD;  Location: [facility]     HC EXCISION BREAST LESION, OPEN >=1      benign     HEMORRHOID SURGERY       HYSTERECTOMY, PAP STILL INDICATED      benign/with both ovaries(cervix in)         Current Outpatient Prescriptions   Medication Sig Dispense Refill     omeprazole (PRILOSEC) 40 MG capsule Take 1 capsule (40 mg) by mouth daily 90 capsule 3     cimetidine (TAGAMET) 400 MG tablet Take 1 tablet (400 mg) by mouth At Bedtime 30 tablet 11     desonide (DESOWEN) 0.05 % ointment Apply thin layer to affected area BID. 30 g 1     nitroglycerin (NITROGLYCERIN) 2 % OINT ointment 3-4 times a day. Use with 9 parts petroleum and apply to hemmrhoids. 15 g 3     estrogens, conjugated, (PREMARIN) 0.625 MG tablet  "Take 1 tablet (0.625 mg) by mouth daily 90 tablet 3     SUMAtriptan (IMITREX) 25 MG tablet Take 1 tablet (25 mg) by mouth at onset of headache for migraine May repeat in 2 hours if needed: max 2/day 9 tablet 5     benzoyl peroxide 5 % topical gel Apply topically daily 60 g 2     Calcium Carb-Cholecalciferol 600-800 MG-UNIT TABS Dose unknown       vitamin  B complex with vitamin C (VITAMIN  B COMPLEX) TABS Take 1 tablet by mouth daily       ascorbic acid (VITAMIN C) 1000 MG TABS Take 1 tablet by mouth daily.       vitamin E 400 UNIT capsule Take 1 capsule by mouth daily.       Omega-3 Fatty Acids (FISH OIL) 1200 MG capsule Take 1 capsule by mouth daily.       GLUCOSAMINE CHONDROITIN OR TABS TAKE 3 TABLETS (1500MG-GLUCOSAMINE) DAILY 300 tab 3     MULTI-VITAMIN OR TABS 1 TABLET DAILY 35 0   she is not on biotin      Physical Exam   GENERAL: thin middle aged woman in NAD   /79  Pulse 73  Ht 1.66 m (5' 5.35\")  Wt 64.6 kg (142 lb 8 oz)  BMI 23.46 kg/m2  SKIN: normal  color, normal temperature, texture   HEENT: PER, no scleral icterus, eyelid retraction, stare, lid lag, proptosis or conjunctival injection.   NECK: No visible neck masses, cervical adenopathy.  Thyroid is palpable on the right only, firm. I can't define nodule.  I have performed real time thyroid and neck US.  The right posterior dominant cystic mass is 1.5 x 1.4 x 2.2 cm (was 1.2 x 1.5 x 1.9 cm 11/16)  NEURO: Alert, moves all extremities,  gait normal,  .   DATA review:     Results for SHAISTA ROWLAND (MRN [id number]) as of 5/22/2017 08:58   Ref. Range 11/22/2016 17:27 4/19/2017 08:51   Sodium Latest Ref Range: 133 - 144 mmol/L  142   Potassium Latest Ref Range: 3.4 - 5.3 mmol/L  4.6   Chloride Latest Ref Range: 94 - 109 mmol/L  108   Carbon Dioxide Latest Ref Range: 20 - 32 mmol/L  27   Urea Nitrogen Latest Ref Range: 7 - 30 mg/dL  11   Creatinine Latest Ref Range: 0.52 - 1.04 mg/dL  0.66   GFR Estimate Latest Ref Range: >60 mL/min/1.7m2  " >90...   GFR Estimate If Black Latest Ref Range: >60 mL/min/1.7m2  >90...   Calcium Latest Ref Range: 8.5 - 10.1 mg/dL  9.2   Anion Gap Latest Ref Range: 3 - 14 mmol/L  7   Phosphorus Latest Ref Range: 2.5 - 4.5 mg/dL 3.5    Calcium Ionized Latest Ref Range: 4.4 - 5.2 mg/dL 4.8    Cholesterol Latest Ref Range: <200 mg/dL  247 (H)   HDL Cholesterol Latest Ref Range: >49 mg/dL  69   LDL Cholesterol Calculated Latest Ref Range: <100 mg/dL  158 (H)   Non HDL Cholesterol Latest Ref Range: <130 mg/dL  178 (H)   Triglycerides Latest Ref Range: <150 mg/dL  101   TSH Latest Ref Range: 0.40 - 4.00 mU/L 2.64    Vitamin D Deficiency screening Latest Ref Range: 20 - 75 ug/L 63      Results for SHAISTA ROWLAND (MRN [id number]) as of 5/22/2017 08:58   Ref. Range 11/22/2016 17:27   Parathyroid Hormone Intact Latest Ref Range: 12 - 72 pg/mL 51       Again, thank you for allowing me to participate in the care of your patient.      Sincerely,    Kiersten Tenorio MD

## 2017-05-22 NOTE — PROGRESS NOTES
Assessment / Plan  1. heterogeneous thyroid (with appearance of AITD)  in patient with adult age radiation exposures.  The nodules are small and none has characteristics of great concern.      2  Posterior to Right thyroid cyst has enlarged further -  -I am more convinced today that it is intrathyroidal and just a pure  cyst.  Repeat formal US neck and thyroid.    RTC 1 year.     3 History of possible irradiation exposure causing health risks. This would have been in her 20's. This may be a small risk factor for future head and neck tumors including of the thyroid or parathyroid -as per # 1 and # 2.  Both will continue to need to be watched periodically    Kiersten Tenorio MD.    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  I last saw her 11/16.     We have been following her with periodic US and FNAB of thyroid nodules.    6/12/13 she had FNAB of the dominant right (# 3) and left dominant nodules (#3), as well as another left lobe nodule.  benign on all 3 nodules ([id number])    The most recent formal thyroid US was 3/13/15. When I saw her in clinic 11/15 I noted that the Anechoic cystic mass posterior to right inferior thyroid  Was larger, then 1.2 x 1.5 x 1.9 cm (2015 was 0.8 x 1 x 1.5 cm; 2013 was 0.5 x 0.6 x 0.6 cm) .  We did parathyroid related labs as I considered the possibility the cyst was a cystic parathyroid.  The parathyroid labs were normal.      We have the following tumor marker and antibody data:  10/21/09: TP0 < 10  11/1/10: IRIS < 20, calcitonin 3  3/13/15: Tg 13.8, IRIS < 0.4, TSH   11/22/16: Tg 11.7 (concurrent 11.3), IRIS < 0.4, PTH 51, vitamin D 63  4/19/17: Ca 9.2.     Review of the image performed after the appt  5/25/17 thyroid and neck US:    Right # 1 0.8 x 0.4 x 0.9 cm , hyperechoic  (was 0.7 x 0.7 x  0.8 hyperechoic)  (was 0.8 x0.7 x 0.9 mixed)  Right # 2 cyst posterior 1.7 x  1.5 x 2.2 cm (was 0.8 x 1 x 1.5 cm   Right # 3 1 x 0.7 x 0.9 cm  (was 0.8 x0.7 x 0.9 mixed)    Left dense shadowing calcification 0.2 x 0.2 x 0.2 cm   Left # 2 0.5  X 0.3 x 0.7 cm  Left # 3  1.0 x 0.6 x 1.2 - posterior , hyperechoic with white margins (was 0.8 x0.7 x 1.0 cm )  Left # 4 0.6 x 0.4 x (was 0.5 x 0.3 x 0.7 cm)  No abnormal lymph nodes .    ROS   No awareness of nodule or thyroid.   No swallow or voice symptoms.   Feels normal   Cardiac, respiratory, GI: negative.     Family Hx    Family History   Problem Relation Age of Onset     DIABETES Maternal Grandfather      Cancer - colorectal Maternal Grandfather      70s     Hypertension Father      Prostate Cancer Father      CANCER Father      Basal Cell Carcinoma     CEREBROVASCULAR DISEASE Maternal Grandmother      Cancer - colorectal Maternal Grandmother      70s     Cardiovascular Mother      MI in her 70's(4 stents)     No known thyroid cancer    Personal Hx   Behavioral history: No tobacco use.   Home environment: No secondhand tobacco smoke in home.   .   PMH   Past Medical History:   Diagnosis Date     Esophageal reflux      Family history of colon cancer      Globus sensation      Multiple thyroid nodules     2009, 2013 FNAB benign     Other forms of migraine, without mention of intractable migraine without mention of status migrainosus      Unspecified hemorrhoids without mention of complication      Past Surgical History:   Procedure Laterality Date     COLONOSCOPY  5/20/2013    Procedure: COLONOSCOPY;;  Surgeon: Jim Flores MD;  Location: [facility]     HC EXCISION BREAST LESION, OPEN >=1      benign     HEMORRHOID SURGERY       HYSTERECTOMY, PAP STILL INDICATED      benign/with both ovaries(cervix in)         Current Outpatient Prescriptions   Medication Sig Dispense Refill     omeprazole (PRILOSEC) 40 MG capsule Take 1 capsule (40 mg) by mouth daily 90 capsule 3     cimetidine (TAGAMET) 400 MG tablet Take 1 tablet (400 mg) by mouth At  "Bedtime 30 tablet 11     desonide (DESOWEN) 0.05 % ointment Apply thin layer to affected area BID. 30 g 1     nitroglycerin (NITROGLYCERIN) 2 % OINT ointment 3-4 times a day. Use with 9 parts petroleum and apply to hemmrhoids. 15 g 3     estrogens, conjugated, (PREMARIN) 0.625 MG tablet Take 1 tablet (0.625 mg) by mouth daily 90 tablet 3     SUMAtriptan (IMITREX) 25 MG tablet Take 1 tablet (25 mg) by mouth at onset of headache for migraine May repeat in 2 hours if needed: max 2/day 9 tablet 5     benzoyl peroxide 5 % topical gel Apply topically daily 60 g 2     Calcium Carb-Cholecalciferol 600-800 MG-UNIT TABS Dose unknown       vitamin  B complex with vitamin C (VITAMIN  B COMPLEX) TABS Take 1 tablet by mouth daily       ascorbic acid (VITAMIN C) 1000 MG TABS Take 1 tablet by mouth daily.       vitamin E 400 UNIT capsule Take 1 capsule by mouth daily.       Omega-3 Fatty Acids (FISH OIL) 1200 MG capsule Take 1 capsule by mouth daily.       GLUCOSAMINE CHONDROITIN OR TABS TAKE 3 TABLETS (1500MG-GLUCOSAMINE) DAILY 300 tab 3     MULTI-VITAMIN OR TABS 1 TABLET DAILY 35 0   she is not on biotin      Physical Exam   GENERAL: thin middle aged woman in NAD   /79  Pulse 73  Ht 1.66 m (5' 5.35\")  Wt 64.6 kg (142 lb 8 oz)  BMI 23.46 kg/m2  SKIN: normal  color, normal temperature, texture   HEENT: PER, no scleral icterus, eyelid retraction, stare, lid lag, proptosis or conjunctival injection.   NECK: No visible neck masses, cervical adenopathy.  Thyroid is palpable on the right only, firm. I can't define nodule.  I have performed real time thyroid and neck US.  The right posterior dominant cystic mass is 1.5 x 1.4 x 2.2 cm (was 1.2 x 1.5 x 1.9 cm 11/16)  NEURO: Alert, moves all extremities,  gait normal,  .   DATA review:     Results for SHAISTA ROWLAND (MRN [id number]) as of 5/22/2017 08:58   Ref. Range 11/22/2016 17:27 4/19/2017 08:51   Sodium Latest Ref Range: 133 - 144 mmol/L  142   Potassium Latest Ref " Range: 3.4 - 5.3 mmol/L  4.6   Chloride Latest Ref Range: 94 - 109 mmol/L  108   Carbon Dioxide Latest Ref Range: 20 - 32 mmol/L  27   Urea Nitrogen Latest Ref Range: 7 - 30 mg/dL  11   Creatinine Latest Ref Range: 0.52 - 1.04 mg/dL  0.66   GFR Estimate Latest Ref Range: >60 mL/min/1.7m2  >90...   GFR Estimate If Black Latest Ref Range: >60 mL/min/1.7m2  >90...   Calcium Latest Ref Range: 8.5 - 10.1 mg/dL  9.2   Anion Gap Latest Ref Range: 3 - 14 mmol/L  7   Phosphorus Latest Ref Range: 2.5 - 4.5 mg/dL 3.5    Calcium Ionized Latest Ref Range: 4.4 - 5.2 mg/dL 4.8    Cholesterol Latest Ref Range: <200 mg/dL  247 (H)   HDL Cholesterol Latest Ref Range: >49 mg/dL  69   LDL Cholesterol Calculated Latest Ref Range: <100 mg/dL  158 (H)   Non HDL Cholesterol Latest Ref Range: <130 mg/dL  178 (H)   Triglycerides Latest Ref Range: <150 mg/dL  101   TSH Latest Ref Range: 0.40 - 4.00 mU/L 2.64    Vitamin D Deficiency screening Latest Ref Range: 20 - 75 ug/L 63      Results for SHAISTA ROWLAND (MRN [id number]) as of 5/22/2017 08:58   Ref. Range 11/22/2016 17:27   Parathyroid Hormone Intact Latest Ref Range: 12 - 72 pg/mL 51       EXAMINATION: US HEAD NECK SOFT TISSUE, 5/25/2017 7:34 AM      COMPARISON: None.     HISTORY: Thyroid nodule     FINDINGS:     Lymph nodes are measured bilaterally with measurements given in  craniocaudal, transverse and AP dimensions as follows:     Right:  Level 1: Negative  Level 2: Negative  Level 3: Negative  Level 4: Negative  Level 5: Negative  Level 6: Negative  Level 7 is not well evaluated by ultrasound.     Left:  Level 1: Negative  Level 2: Normal-appearing lymph node with a fatty hilum measuring 5 x  10 x 14 mm.  Level 3: Negative  Level 4: Negative  Level 5: Negative  Level 6: Negative  Level 7 is not well evaluated by ultrasound.         IMPRESSION:  Normal soft tissue neck ultrasound with lymph node measurements as  described above.     I have personally reviewed the examination and  initial interpretation  and I agree with the findings.     TEOFILO ROSENBAUM MD    EXAMINATION: US THYROID, 5/25/2017 7:34 AM      COMPARISON: 3/13/2015, 5/21/2013     HISTORY: Thyroid cyst     Technique: Grayscale and color ultrasound imaging of the thyroid was  performed.     Findings:    Thyroid parenchyma: Normal     The right lobe of the thyroid measures: 2.4 x 2.3 x 5.5 cm      The thyroid isthmus measures: 0.3 cm      The left lobe of the thyroid measures: 1.4 x 1.3 x 5.0 cm. Coarse  calcification in the superior pole is not associated with a nodule.      Right lobe:  Nodule 1: Upper pole  Nodule measurement: 8 x 4 x 9 mm , not significantly changed  Echogenicity: Hyperechoic  Consistency: solid  Calcifications: None  Hypervascular: yes  Interval growth (>20%): no     Nodule 2: Lower pole  Nodule measurement: 17 x 15 x 22 mm , previously 8 x 10 x 15 mm 2  years ago  Echogenicity: Anechoic  Consistency: cystic  Calcifications: None  Hypervascular: no  Interval growth (>20%): yes     Nodule 3: Lower pole  Nodule measurement: 10 x 7 x 9 mm , not significantly changed  Echogenicity: Mildly hypoechoic  Consistency: solid  Calcifications: no  Hypervascular: yes  Interval growth (>20%): no     Isthmus: None     Left Lobe:   Nodule 2: Mid  Nodule measurement: 5 x 3 x 7 mm in the mid gland , not significantly  changed  Echogenicity: Hypoechoic  Consistency: solid  Calcifications: None  Hypervascular: yes  Interval growth (>20%): no     Nodule 3: Lower pole  Nodule measurement: 10 x 6 x 12 mm , minimally increased  Echogenicity: Hypoechoic-anechoic  Consistency: Could be solid or cystic located deep with posterior  acoustic enhancement  Calcifications: None  Hypervascular: no  Interval growth (>20%): no     Nodule 4: Lower pole  Nodule measurement: 6 x 4 x 7 mm , unchanged  Echogenicity: Hypoechoic-anechoic  Consistency: Could be solid or cystic located deep with posterior  acoustic enhancement  Calcifications:  None  Hypervascular: no  Interval growth (>20%): no         Impression:  1. Relatively stable solid thyroid nodules.  2. Enlargement of an almost entirely cystic nodule in the right lobe.     I have personally reviewed the examination and initial interpretation  and I agree with the findings.     TEOFILO ROSENBAUM MD

## 2017-05-22 NOTE — NURSING NOTE
Chief Complaint   Patient presents with     RECHECK     F/U THYROID NODULE     Lesa Albert, CMA  Endocrinology & Diabetes 3G

## 2017-05-22 NOTE — MR AVS SNAPSHOT
After Visit Summary   2017    Amanda Sifuentes    MRN: [id number]           Patient Information     Date Of Birth          1965        Visit Information        Provider Department      2017 1:00 PM Kiersten Tenorio MD [facility]        Today's Diagnoses     Thyroid cyst    -  1      Care Instructions    IMAGING [phone]      To expedite your medication refill(s), please contact your pharmacy and have them fax a refill request to: [phone].  *Please allow 3 business days for routine medication refills.  *Please allow 5 business days for controlled substance medication refills.  --------------------  For scheduling appointments (including lab work), please request an appointment through Space Race, or call: [phone].    For questions for your provider or the endocrine nurse, please send a Space Race message.  For after-hours urgent issues, please dial [phone], and ask to speak with the Endocrinologist On-Call.  --------------------  Please Note: If you are active on Space Race, all future test results will be sent by Space Race message only and will no longer be sent by mail. You may also receive communication directly from your physician.          Follow-ups after your visit        Follow-up notes from your care team     Return in about 1 year (around 2018).      Future tests that were ordered for you today     Open Future Orders        Priority Expected Expires Ordered    US Thyroid Routine  2017    US head neck soft tissue Routine  2017            Who to contact     Please call your clinic at [phone] to:    Ask questions about your health    Make or cancel appointments    Discuss your medicines    Learn about your test results    Speak to your doctor   If you have compliments or concerns about an experience at your clinic, or if you wish to file a complaint, please contact [facility]  "Patient Relations at [phone] or email us at [email or web address]         Additional Information About Your Visit        MyChart Information     Kybernesis gives you secure access to your electronic health record. If you see a primary care provider, you can also send messages to your care team and make appointments. If you have questions, please call your primary care clinic.  If you do not have a primary care provider, please call [phone] and they will assist you.      Kybernesis is an electronic gateway that provides easy, online access to your medical records. With Kybernesis, you can request a clinic appointment, read your test results, renew a prescription or communicate with your care team.     To access your existing account, please contact your [facility] or call [phone] for assistance.        Care EveryWhere ID     This is your Care EveryWhere ID. This could be used by other organizations to access your [facility] medical records  [id number]        Your Vitals Were     Pulse Height BMI (Body Mass Index)             73 1.66 m (5' 5.35\") 23.46 kg/m2          Blood Pressure from Last 3 Encounters:   05/22/17 127/79   04/19/17 126/80   11/22/16 127/85    Weight from Last 3 Encounters:   05/22/17 64.6 kg (142 lb 8 oz)   04/19/17 61.7 kg (136 lb)   11/22/16 66.8 kg (147 lb 3.2 oz)               Primary Care Provider Office Phone # Fax #    Noreen Celestedavitari Lorenzo,  [phone]       [address] [address]        Thank you!     Thank you for choosing [facility]  for your care. Our goal is always to provide you with excellent care. Hearing back from our patients is one way we can continue to improve our services. Please take a few minutes to complete the written survey that you may receive in the mail after your visit with us. Thank you!             Your Updated Medication List - Protect " others around you: Learn how to safely use, store and throw away your medicines at www.disposemymeds.org.          This list is accurate as of: 5/22/17  1:26 PM.  Always use your most recent med list.                   Brand Name Dispense Instructions for use    ascorbic acid 1000 MG Tabs    vitamin C     Take 1 tablet by mouth daily.       benzoyl peroxide 5 % topical gel     60 g    Apply topically daily       Calcium Carb-Cholecalciferol 600-800 MG-UNIT Tabs      Dose unknown       cimetidine 400 MG tablet    TAGAMET    30 tablet    Take 1 tablet (400 mg) by mouth At Bedtime       desonide 0.05 % ointment    DESOWEN    30 g    Apply thin layer to affected area BID.       estrogens (conjugated) 0.625 MG tablet    PREMARIN    90 tablet    Take 1 tablet (0.625 mg) by mouth daily       GLUCOSAMINE CHONDROITIN Tabs     300 tab    TAKE 3 TABLETS (1500MG-GLUCOSAMINE) DAILY       Multi-vitamin Tabs tablet   Generic drug:  multivitamin, therapeutic with minerals     35    1 TABLET DAILY       nitroglycerin 2 % Oint ointment    nitroGLYCERIN    15 g    3-4 times a day. Use with 9 parts petroleum and apply to hemmrhoids.       omega-3 fatty acids 1200 MG capsule      Take 1 capsule by mouth daily.       omeprazole 40 MG capsule    priLOSEC    90 capsule    Take 1 capsule (40 mg) by mouth daily       SUMAtriptan 25 MG tablet    IMITREX    9 tablet    Take 1 tablet (25 mg) by mouth at onset of headache for migraine May repeat in 2 hours if needed: max 2/day       vitamin B complex with vitamin C Tabs tablet      Take 1 tablet by mouth daily       vitamin E 400 UNIT capsule      Take 1 capsule by mouth daily.

## 2017-05-22 NOTE — PATIENT INSTRUCTIONS
IMAGING [phone]      To expedite your medication refill(s), please contact your pharmacy and have them fax a refill request to: [phone].  *Please allow 3 business days for routine medication refills.  *Please allow 5 business days for controlled substance medication refills.  --------------------  For scheduling appointments (including lab work), please request an appointment through Taboola, or call: [phone].    For questions for your provider or the endocrine nurse, please send a Taboola message.  For after-hours urgent issues, please dial [phone], and ask to speak with the Endocrinologist On-Call.  --------------------  Please Note: If you are active on Taboola, all future test results will be sent by Taboola message only and will no longer be sent by mail. You may also receive communication directly from your physician.

## 2017-12-11 ENCOUNTER — OFFICE VISIT (OUTPATIENT)
Dept: FAMILY MEDICINE | Facility: CLINIC | Age: 52
End: 2017-12-11
Payer: COMMERCIAL

## 2017-12-11 VITALS
SYSTOLIC BLOOD PRESSURE: 124 MMHG | BODY MASS INDEX: 24.99 KG/M2 | HEART RATE: 70 BPM | HEIGHT: 65 IN | DIASTOLIC BLOOD PRESSURE: 72 MMHG | WEIGHT: 150 LBS | TEMPERATURE: 97.9 F

## 2017-12-11 DIAGNOSIS — F41.1 GAD (GENERALIZED ANXIETY DISORDER): Primary | ICD-10-CM

## 2017-12-11 PROCEDURE — 99214 OFFICE O/P EST MOD 30 MIN: CPT | Performed by: FAMILY MEDICINE

## 2017-12-11 RX ORDER — CITALOPRAM HYDROBROMIDE 20 MG/1
20 TABLET ORAL DAILY
Qty: 30 TABLET | Refills: 1 | Status: SHIPPED | OUTPATIENT
Start: 2017-12-11 | End: 2018-04-09

## 2017-12-11 ASSESSMENT — ANXIETY QUESTIONNAIRES
5. BEING SO RESTLESS THAT IT IS HARD TO SIT STILL: SEVERAL DAYS
IF YOU CHECKED OFF ANY PROBLEMS ON THIS QUESTIONNAIRE, HOW DIFFICULT HAVE THESE PROBLEMS MADE IT FOR YOU TO DO YOUR WORK, TAKE CARE OF THINGS AT HOME, OR GET ALONG WITH OTHER PEOPLE: VERY DIFFICULT
GAD7 TOTAL SCORE: 16
1. FEELING NERVOUS, ANXIOUS, OR ON EDGE: NEARLY EVERY DAY
7. FEELING AFRAID AS IF SOMETHING AWFUL MIGHT HAPPEN: MORE THAN HALF THE DAYS
6. BECOMING EASILY ANNOYED OR IRRITABLE: NEARLY EVERY DAY
3. WORRYING TOO MUCH ABOUT DIFFERENT THINGS: NEARLY EVERY DAY
2. NOT BEING ABLE TO STOP OR CONTROL WORRYING: MORE THAN HALF THE DAYS

## 2017-12-11 ASSESSMENT — PATIENT HEALTH QUESTIONNAIRE - PHQ9
5. POOR APPETITE OR OVEREATING: MORE THAN HALF THE DAYS
SUM OF ALL RESPONSES TO PHQ QUESTIONS 1-9: 7

## 2017-12-11 NOTE — MR AVS SNAPSHOT
After Visit Summary   12/11/2017    Amanda Sifuentes    MRN: [id number]           Patient Information     Date Of Birth          1965        Visit Information        Provider Department      12/11/2017 7:40 AM Fadi Cabrera MD [facility]        Today's Diagnoses     ITALO (generalized anxiety disorder)    -  1      Care Instructions    [facility]   Discharged by : mary  Paper scripts provided to patient : none     If you have any questions regarding your visit please contact your care team:     Team Gold                Clinic Hours Telephone Number     Dr. Jennifer Soto 7am-7pm  Monday - Thursday   7am-5pm  Fridays  [phone]   (Appointment scheduling available 24/7)     RN Line  [phone] option 2     Urgent Care - [place] and [facility] - 11am-9pm Monday-Friday Saturday-Sunday- 9am-5pm     [place] -   5pm-9pm Monday-Friday Saturday-Sunday- 9am-5pm    [phone] - [place]    [phone] - [place]       For a Price Quote for your services, please call our Consumer Price Line at [phone].     What options do I have for visits at the clinic other than the traditional office visit?     To expand how we care for you, many of our providers are utilizing electronic visits (e-visits) and telephone visits, when medically appropriate, for interactions with their patients rather than a visit in the clinic. We also offer nurse visits for many medical concerns. Just like any other service, we will bill your insurance company for this type of visit based on time spent on the phone with your provider. Not all insurance companies cover these visits. Please check with your medical insurance if this type of visit is covered. You will be responsible for any charges that are not paid by your insurance.   E-visits via Swapper Trade:  generally incur a $35.00 fee.     Telephone visits:   Time spent on the phone: *charged based on time that is spent on the phone in increments of 10 minutes. Estimated cost:   5-10 mins $30.00   11-20 mins. $59.00   21-30 mins. $85.00     Use Elasterahart (secure email communication and access to your chart) to send your primary care provider a message or make an appointment. Ask someone on your Team how to sign up for [employer] Rally[employer] Championshipt.     As always, Thank you for trusting us with your health care needs!      [facility]:    Scheduling Appointments  [facility]  Call: [phone]    [facility]  Call: [phone]    [facility]  Call: [phone]    For Gastroenterology referrals   [facility]   Clinics and Surgery Center, 4th Floor   [address] [place] [address]   Appointments: [phone]    WHERE TO GO FOR CARE?  Clinic    Make an appointment if you:       Are sick (cold, cough, flu, sore throat, earache or in pain).       Have a small injury (sprain, small cut, burn or broken bone).       Need a physical exam, Pap smear, vaccine or prescription refill.       Have questions about your health or medicines.    To reach us:      Call [phone] ([phone]). Open 24 hours every day. (For counseling services, call [phone].)    Log into [employer] at [email or web address]. (Visit [email or web address] to create an account.) Hospital emergency room    An emergency is a serious or life- threatening problem that must be treated right away.    Call 888 or get to the hospital if you have:      Very bad or sudden:            - Chest pain or pressure         - Bleeding         - Head or belly pain         - Dizziness or trouble seeing, walking or                          Speaking      Problems breathing      Blood in your vomit or you are coughing up blood      A major injury (knocked out, loss of a finger  or limb, rape, broken bone protruding from skin)    A mental health crisis. (Or call the Mental Health Crisis line at 1-335.248.7399 or Suicide Prevention Hotline at 1-962.527.2451.)    Open 24 hours every day. You don't need an appointment.     Urgent care    Visit urgent care for sickness or small injuries when the clinic is closed. You don't need an appointment. To check hours or find an urgent care near you, visit [email or web address]. Online care    Get online care from OnCJiglu for more than 70 common problems, like colds, allergies and infections. Open 24 hours every day at:   [email or web address]   Need help deciding?    For advice about where to be seen, you may call your clinic and ask to speak with a nurse. We're here for you 24 hours every day.         If you are deaf or hard of hearing, please let us know. We provide many free services including sign language interpreters, oral interpreters, TTYs, telephone amplifiers, note takers and written materials.                   Follow-ups after your visit        Additional Services     MENTAL HEALTH REFERRAL  - Adult; Outpatient Treatment; Individual/Couples/Family/Group Therapy/Health Psychology; Other: Behavioral Healthcare Providers [phone]; We will contact you to schedule the appointment or please call with any questi...       All scheduling is subject to the client's specific insurance plan & benefits, provider/location availability, and provider clinical specialities.  Please arrive 15 minutes early for your first appointment and bring your completed paperwork.    Please be aware that coverage of these services is subject to the terms and limitations of your health insurance plan.  Call member services at your health plan with any benefit or coverage questions.                            Follow-up notes from your care team     Return in about 3 weeks (around 1/1/2018).      Your next 10 appointments already scheduled     May 07, 2018  4:40 PM CDT   (Arrive by  "4:25 PM)   RETURN ENDOCRINE with Kiersten Tenorio MD   [facility] ([facility] [place])    [address]  [address] [facility] [address] [phone]              Who to contact     If you have questions or need follow up information about today's clinic visit or your schedule please contact [facility] directly at [phone].  Normal or non-critical lab and imaging results will be communicated to you by Wanovahart, letter or phone within 4 business days after the clinic has received the results. If you do not hear from us within 7 days, please contact the clinic through [employer] AGt or phone. If you have a critical or abnormal lab result, we will notify you by phone as soon as possible.  Submit refill requests through Agistics or call your pharmacy and they will forward the refill request to us. Please allow 3 business days for your refill to be completed.          Additional Information About Your Visit        WanovaharTimehop Information     Agistics gives you secure access to your electronic health record. If you see a primary care provider, you can also send messages to your care team and make appointments. If you have questions, please call your primary care clinic.  If you do not have a primary care provider, please call [phone] and they will assist you.        Care EveryWhere ID     This is your Care EveryWhere ID. This could be used by other organizations to access your [place] medical records  [id number]        Your Vitals Were     Pulse Temperature Height BMI (Body Mass Index)          70 97.9  F (36.6  C) (Oral) 5' 5.35\" (1.66 m) 24.69 kg/m2         Blood Pressure from Last 3 Encounters:   12/11/17 124/72   05/22/17 127/79   04/19/17 126/80    Weight from Last 3 Encounters:   12/11/17 150 lb (68 kg)   05/22/17 142 lb 8 oz (64.6 kg)   04/19/17 136 lb (61.7 kg)              We Performed the Following     MENTAL HEALTH REFERRAL  - Adult; Outpatient " Treatment; Individual/Couples/Family/Group Therapy/Health Psychology; Other: Behavioral Healthcare Providers [phone]; We will contact you to schedule the appointment or please call with any questi...          Today's Medication Changes          These changes are accurate as of: 12/11/17  8:25 AM.  If you have any questions, ask your nurse or doctor.               Start taking these medicines.        Dose/Directions    citalopram 20 MG tablet   Commonly known as:  celeXA   Used for:  ITALO (generalized anxiety disorder)   Started by:  Fadi Cabrera MD        Dose:  20 mg   Take 1 tablet (20 mg) by mouth daily   Quantity:  30 tablet   Refills:  1            Where to get your medicines      These medications were sent to [place] [address] [facility] [place] - [address]  [address] [place] [address]     Phone:  [phone]     citalopram 20 MG tablet                Primary Care Provider Office Phone # Fax #    Noreen Booker Lorenzo,  [phone]       [address] [place] [address]        Equal Access to Services     [facility]: Hadii aad ku hadasho Soomaali, waaxda luqadaha, qaybta kaalmada adeegyada, alonzo luu . So [facility] [phone].    ATENCIÓN: Si habla español, tiene a rabago disposición servicios gratuitos de asistencia lingüística. Pily al [phone].    We comply with applicable federal civil rights laws and [place] laws. We do not discriminate on the basis of race, color, national origin, age, disability, sex, sexual orientation, or gender identity.            Thank you!     Thank you for choosing [facility]  for your care. Our goal is always to provide you with excellent care. Hearing back from our patients is one way we can continue to improve our services. Please take a few minutes to complete the written survey that you may receive in the mail after your visit with us. Thank you!             Your  Updated Medication List - Protect others around you: Learn how to safely use, store and throw away your medicines at www.disposemymeds.org.          This list is accurate as of: 12/11/17  8:25 AM.  Always use your most recent med list.                   Brand Name Dispense Instructions for use Diagnosis    ascorbic acid 1000 MG Tabs    vitamin C     Take 1 tablet by mouth daily.        benzoyl peroxide 5 % topical gel     60 g    Apply topically daily    Acne vulgaris       Calcium Carb-Cholecalciferol 600-800 MG-UNIT Tabs      Dose unknown    High serum parathyroid hormone (PTH), History of irradiation, presenting hazards to health       cimetidine 400 MG tablet    TAGAMET    30 tablet    Take 1 tablet (400 mg) by mouth At Bedtime    Other type of migraine       citalopram 20 MG tablet    celeXA    30 tablet    Take 1 tablet (20 mg) by mouth daily    ITALO (generalized anxiety disorder)       desonide 0.05 % ointment    DESOWEN    30 g    Apply thin layer to affected area BID.    Acne vulgaris       estrogens (conjugated) 0.625 MG tablet    PREMARIN    90 tablet    Take 1 tablet (0.625 mg) by mouth daily    Menopausal syndrome (hot flashes)       GLUCOSAMINE CHONDROITIN Tabs     300 tab    TAKE 3 TABLETS (1500MG-GLUCOSAMINE) DAILY        Multi-vitamin Tabs tablet   Generic drug:  multivitamin, therapeutic with minerals     35    1 TABLET DAILY    OTC -PATIENT CHOICE       nitroGLYcerin 2 % Oint ointment    NITRO-BID    15 g    3-4 times a day. Use with 9 parts petroleum and apply to hemmrhoids.    Hemorrhoids, unspecified hemorrhoid type       omega-3 fatty acids 1200 MG capsule      Take 1 capsule by mouth daily.        omeprazole 40 MG capsule    priLOSEC    90 capsule    Take 1 capsule (40 mg) by mouth daily    Gastroesophageal reflux disease without esophagitis       SUMAtriptan 25 MG tablet    IMITREX    9 tablet    Take 1 tablet (25 mg) by mouth at onset of headache for migraine May repeat in 2 hours if needed:  max 2/day    Other type of migraine       vitamin B complex with vitamin C Tabs tablet      Take 1 tablet by mouth daily        vitamin E 400 UNIT capsule      Take 1 capsule by mouth daily.

## 2017-12-11 NOTE — PATIENT INSTRUCTIONS
[facility]   Discharged by : mary  Paper scripts provided to patient : none     If you have any questions regarding your visit please contact your care team:     Team Gold                Clinic Hours Telephone Number     Dr. Jennifer Soto 7am-7pm  Monday - Thursday   7am-5pm  Fridays  [phone]   (Appointment scheduling available 24/7)     RN Line  [phone] option 2     [facility] [place] [facility] 11am-9pm Monday-Friday Saturday-Sunday- 9am-5pm     [place] -   5pm-9pm Monday-Friday Saturday-Sunday- 9am-5pm    [phone] - [place]    [phone] - [place]       For a Price Quote for your services, please call our Ther[employer] Price Line at [phone].     What options do I have for visits at the clinic other than the traditional office visit?     To expand how we care for you, many of our providers are utilizing electronic visits (e-visits) and telephone visits, when medically appropriate, for interactions with their patients rather than a visit in the clinic. We also offer nurse visits for many medical concerns. Just like any other service, we will bill your insurance company for this type of visit based on time spent on the phone with your provider. Not all insurance companies cover these visits. Please check with your medical insurance if this type of visit is covered. You will be responsible for any charges that are not paid by your insurance.   E-visits via [employer]: generally incur a $35.00 fee.     Telephone visits:   Time spent on the phone: *charged based on time that is spent on the phone in increments of 10 minutes. Estimated cost:   5-10 mins $30.00   11-20 mins. $59.00   21-30 mins. $85.00     Use [employer] (secure email communication and access to your chart) to send your primary care provider a message or make an appointment. Ask someone on your  Team how to sign up for Yurpy.     As always, Thank you for trusting us with your health care needs!      [facility]:    Scheduling Appointments  [place]  Call: [phone]    Abilio Chavez [facility]  Call: [phone]    [facility]  Call: [phone]    For Gastroenterology referrals   [facility] Gastroenterology   Clinics and Surgery Center, 4th Floor   [address] [place] [address]   Appointments: [phone]    WHERE TO GO FOR CARE?  Clinic    Make an appointment if you:       Are sick (cold, cough, flu, sore throat, earache or in pain).       Have a small injury (sprain, small cut, burn or broken bone).       Need a physical exam, Pap smear, vaccine or prescription refill.       Have questions about your health or medicines.    To reach us:      Call [phone] ([phone]). Open 24 hours every day. (For counseling services, call [phone].)    Log into Yurpy at [email or web address]. (Visit [email or web address] to create an account.) Hospital emergency room    An emergency is a serious or life- threatening problem that must be treated right away.    Call 041 or get to the hospital if you have:      Very bad or sudden:            - Chest pain or pressure         - Bleeding         - Head or belly pain         - Dizziness or trouble seeing, walking or                          Speaking      Problems breathing      Blood in your vomit or you are coughing up blood      A major injury (knocked out, loss of a finger or limb, rape, broken bone protruding from skin)    A mental health crisis. (Or call the Mental Health Crisis line at 1-856.164.5845 or Suicide Prevention Hotline at 1-199.463.3290.)    Open 24 hours every day. You don't need an appointment.     Urgent care    Visit urgent care for sickness or small injuries when the clinic is closed. You don't need an appointment. To check hours or find an  urgent care near you, visit [email or web address]. Online care    Get online care from OnCare for more than 70 common problems, like colds, allergies and infections. Open 24 hours every day at:   [email or web address]   Need help deciding?    For advice about where to be seen, you may call your clinic and ask to speak with a nurse. We're here for you 24 hours every day.         If you are deaf or hard of hearing, please let us know. We provide many free services including sign language interpreters, oral interpreters, TTYs, telephone amplifiers, note takers and written materials.

## 2017-12-11 NOTE — NURSING NOTE
"Chief Complaint   Patient presents with     Anxiety     new problem       Initial /72  Pulse 70  Temp 97.9  F (36.6  C) (Oral)  Ht 5' 5.35\" (1.66 m)  Wt 150 lb (68 kg)  BMI 24.69 kg/m2 Estimated body mass index is 24.69 kg/(m^2) as calculated from the following:    Height as of this encounter: 5' 5.35\" (1.66 m).    Weight as of this encounter: 150 lb (68 kg).  Medication Reconciliation: complete   Lana Wilder MA      "

## 2017-12-11 NOTE — PROGRESS NOTES
SUBJECTIVE:   Amanda Sifuentes is a 52 year old female who presents to clinic today for the following health issues:      Anxiety Follow-Up    Status since last visit: new problem, a lot going on in her life right now. Never been treated with medications in the past    Other associated symptoms:None    Complicating factors:   Significant life event: No   Current substance abuse: None  Depression symptoms: unsure  ITALO-7 SCORE 12/11/2017   Total Score 16     PHQ-9 SCORE 12/11/2017   Total Score 7       GAD7          Amount of exercise or physical activity: was doing 4 times weekly, and now hazel to get one day a week in    Problems taking medications regularly: No    Medication side effects: none    Diet: regular (no restrictions)    She has noted that self cares such as exercise have helped but less time and energy to engage in those at this time.  Work is a major source of stress and working 70 hours per week at this time.  Wakes up middle of night and unable to fall back to sleep due to work concerns.  Not sure how comfortable she is with medication at this time.  Not sure about time commitment to counseling either due to her busy schedule at this time.        Problem list and histories reviewed & adjusted, as indicated.  Additional history: as documented    Patient Active Problem List   Diagnosis     Hemorrhoids     Other type of migraine     Esophageal reflux     Other acne     CARDIOVASCULAR SCREENING; LDL GOAL LESS THAN 160     Family history of colon cancer     History of hysterectomy for benign disease     Eczema     Poikiloderma of [place]     Solar elastosis     AK (actinic keratosis)     Hyperlipidemia LDL goal <160     Hormone replacement therapy (postmenopausal)     Actinic keratosis     Porokeratosis     Double cervix     Thyroid with heterogeneous echotexture determined by ultrasound     Thyroid cyst     History of irradiation, presenting hazards to health     Hypothyroidism, unspecified type      Past Surgical History:   Procedure Laterality Date     COLONOSCOPY  5/20/2013    Procedure: COLONOSCOPY;;  Surgeon: Jim Flores MD;  Location: [facility]     HC EXCISION BREAST LESION, OPEN >=1      benign     HEMORRHOID SURGERY       HYSTERECTOMY, PAP STILL INDICATED      benign/with both ovaries(cervix in)       Social History   Substance Use Topics     Smoking status: Never Smoker     Smokeless tobacco: Never Used     Alcohol use Yes      Comment: 3 drinks per wek      Family History   Problem Relation Age of Onset     DIABETES Maternal Grandfather      Cancer - colorectal Maternal Grandfather      70s     Hypertension Father      Prostate Cancer Father      CANCER Father      Basal Cell Carcinoma     CEREBROVASCULAR DISEASE Maternal Grandmother      Cancer - colorectal Maternal Grandmother      70s     Cardiovascular Mother      MI in her 70's(4 stents)     Depression Mother          Current Outpatient Prescriptions   Medication Sig Dispense Refill     citalopram (CELEXA) 20 MG tablet Take 1 tablet (20 mg) by mouth daily 30 tablet 1     omeprazole (PRILOSEC) 40 MG capsule Take 1 capsule (40 mg) by mouth daily 90 capsule 3     cimetidine (TAGAMET) 400 MG tablet Take 1 tablet (400 mg) by mouth At Bedtime 30 tablet 11     desonide (DESOWEN) 0.05 % ointment Apply thin layer to affected area BID. 30 g 1     nitroglycerin (NITROGLYCERIN) 2 % OINT ointment 3-4 times a day. Use with 9 parts petroleum and apply to hemmrhoids. 15 g 3     estrogens, conjugated, (PREMARIN) 0.625 MG tablet Take 1 tablet (0.625 mg) by mouth daily 90 tablet 3     SUMAtriptan (IMITREX) 25 MG tablet Take 1 tablet (25 mg) by mouth at onset of headache for migraine May repeat in 2 hours if needed: max 2/day 9 tablet 5     benzoyl peroxide 5 % topical gel Apply topically daily 60 g 2     Calcium Carb-Cholecalciferol 600-800 MG-UNIT TABS Dose unknown       vitamin  B complex with vitamin C (VITAMIN  B COMPLEX) TABS Take 1 tablet by mouth daily   "     ascorbic acid (VITAMIN C) 1000 MG TABS Take 1 tablet by mouth daily.       vitamin E 400 UNIT capsule Take 1 capsule by mouth daily.       Omega-3 Fatty Acids (FISH OIL) 1200 MG capsule Take 1 capsule by mouth daily.       GLUCOSAMINE CHONDROITIN OR TABS TAKE 3 TABLETS (1500MG-GLUCOSAMINE) DAILY 300 tab 3     MULTI-VITAMIN OR TABS 1 TABLET DAILY 35 0     Allergies   Allergen Reactions     Amoxicillin Nausea and Rash     Penicillins Rash     BP Readings from Last 3 Encounters:   12/11/17 124/72   05/22/17 127/79   04/19/17 126/80    Wt Readings from Last 3 Encounters:   12/11/17 150 lb (68 kg)   05/22/17 142 lb 8 oz (64.6 kg)   04/19/17 136 lb (61.7 kg)                        Reviewed and updated as needed this visit by clinical staffTobacco  Allergies  Meds  Problems  Med Hx  Surg Hx  Fam Hx  Soc Hx        Reviewed and updated as needed this visit by Provider  Tobacco  Allergies  Meds  Problems  Med Hx  Surg Hx  Fam Hx  Soc Hx          ROS:  Constitutional, HEENT, cardiovascular, pulmonary, gi and gu systems are negative, except as otherwise noted.      OBJECTIVE:   /72  Pulse 70  Temp 97.9  F (36.6  C) (Oral)  Ht 5' 5.35\" (1.66 m)  Wt 150 lb (68 kg)  BMI 24.69 kg/m2  Body mass index is 24.69 kg/(m^2).  GENERAL: healthy, alert and no distress  EYES: Eyes grossly normal to inspection, PERRL and conjunctivae and sclerae normal  MS: no gross musculoskeletal defects noted, no edema  NEURO: Normal strength and tone, mentation intact and speech normal  PSYCH: mentation appears normal, affect normal/bright    Diagnostic Test Results:  none     ASSESSMENT/PLAN:             1. ITALO (generalized anxiety disorder)  Discussed the diagnosis and options for treatment with the patient including self cares.  Offered referral for counseling at this time.  Also offered prescription at this time.  If she chooses to use the medication would encourage either clinic or electronic follow up in 3-4 weeks to see " how things are going.  Side effects were reviewed.  - MENTAL HEALTH REFERRAL  - Adult; Outpatient Treatment; Individual/Couples/Family/Group Therapy/Health Psychology; Other: [facility] [phone]; We will contact you to schedule the appointment or please call with any questi...  - citalopram (CELEXA) 20 MG tablet; Take 1 tablet (20 mg) by mouth daily  Dispense: 30 tablet; Refill: 1    FUTURE APPOINTMENTS:       - Follow-up visit in 3-4 weeks    Fadi Cabrera MD  [facility]

## 2017-12-12 ASSESSMENT — ANXIETY QUESTIONNAIRES: GAD7 TOTAL SCORE: 16

## 2018-02-18 DIAGNOSIS — G43.809 OTHER MIGRAINE, NOT INTRACTABLE, WITHOUT STATUS MIGRAINOSUS: Primary | ICD-10-CM

## 2018-02-19 RX ORDER — SUMATRIPTAN 25 MG/1
TABLET, FILM COATED ORAL
Qty: 9 TABLET | Refills: 0 | Status: SHIPPED | OUTPATIENT
Start: 2018-02-19 | End: 2018-04-17

## 2018-02-19 NOTE — TELEPHONE ENCOUNTER
Refill given, as med last refilled back on 4/19/17 and MD note from visit that day regarding migraine states:  (G43.809) Other type of migraine  Comment: stable  Plan: cimetidine (TAGAMET) 400 MG tablet, SUMAtriptan        (IMITREX) 25 MG tablet        -continue present medications, medications refilled     Mary Gunn RN

## 2018-02-19 NOTE — TELEPHONE ENCOUNTER
"Requested Prescriptions   Pending Prescriptions Disp Refills     SUMAtriptan (IMITREX) 25 MG tablet [Pharmacy Med Name: SUMATRIPTAN SUCC 25 MG TABLET] 9 tablet 5     Sig: TAKE 1 TABLET BY MOUTH AT ONSET OF MIGRAINE. MAY REPEAT IN 2 HOURS IF NEEDED. MAX OF 2 PER DAY.    Serotonin Agonists Failed    2/18/2018 10:00 AM       Failed - Serotonin Agonist request needs review.    Please review patient's record. If patient has had 8 or more treatments in the past month, please forward to provider.         Passed - Blood pressure under 140/90 in past 12 months    BP Readings from Last 3 Encounters:   12/11/17 124/72   05/22/17 127/79   04/19/17 126/80                Passed - Recent or future visit with authorizing provider's specialty    Patient had office visit in the last year or has a visit in the next 30 days with authorizing provider.  See \"Patient Info\" tab in inbasket, or \"Choose Columns\" in Meds & Orders section of the refill encounter.            Passed - Patient is age 18 or older       Passed - No active pregnancy on record       Passed - No positive pregnancy test in past 12 months        Last Written Prescription Date: 2/18/18  Last Fill Quantity: 9,  # refills: 5   Last office visit: 12/11/2017 with prescribing provider:  12/11/17   Future Office Visit:   Next 5 appointments (look out 90 days)     Apr 09, 2018 11:30 AM CDT   PHYSICAL with Merry Lucero DO   [facility] ([facility])    [address] [address] [place] [id number] [phone]                   "

## 2018-03-16 ENCOUNTER — TELEPHONE (OUTPATIENT)
Dept: FAMILY MEDICINE | Facility: CLINIC | Age: 53
End: 2018-03-16

## 2018-04-09 ENCOUNTER — RADIANT APPOINTMENT (OUTPATIENT)
Dept: MAMMOGRAPHY | Facility: CLINIC | Age: 53
End: 2018-04-09
Attending: OBSTETRICS & GYNECOLOGY
Payer: COMMERCIAL

## 2018-04-09 ENCOUNTER — OFFICE VISIT (OUTPATIENT)
Dept: OBGYN | Facility: CLINIC | Age: 53
End: 2018-04-09
Payer: COMMERCIAL

## 2018-04-09 VITALS
DIASTOLIC BLOOD PRESSURE: 78 MMHG | BODY MASS INDEX: 23.93 KG/M2 | HEART RATE: 89 BPM | SYSTOLIC BLOOD PRESSURE: 145 MMHG | WEIGHT: 143.6 LBS | OXYGEN SATURATION: 97 % | HEIGHT: 65 IN

## 2018-04-09 DIAGNOSIS — Z12.11 SPECIAL SCREENING FOR MALIGNANT NEOPLASMS, COLON: ICD-10-CM

## 2018-04-09 DIAGNOSIS — Z12.4 SCREENING FOR CERVICAL CANCER: Primary | ICD-10-CM

## 2018-04-09 DIAGNOSIS — Z12.31 VISIT FOR SCREENING MAMMOGRAM: ICD-10-CM

## 2018-04-09 PROCEDURE — 77067 SCR MAMMO BI INCL CAD: CPT | Performed by: STUDENT IN AN ORGANIZED HEALTH CARE EDUCATION/TRAINING PROGRAM

## 2018-04-09 PROCEDURE — G0145 SCR C/V CYTO,THINLAYER,RESCR: HCPCS | Mod: 59 | Performed by: OBSTETRICS & GYNECOLOGY

## 2018-04-09 PROCEDURE — 99386 PREV VISIT NEW AGE 40-64: CPT | Performed by: OBSTETRICS & GYNECOLOGY

## 2018-04-09 PROCEDURE — 87624 HPV HI-RISK TYP POOLED RSLT: CPT | Performed by: OBSTETRICS & GYNECOLOGY

## 2018-04-09 ASSESSMENT — PATIENT HEALTH QUESTIONNAIRE - PHQ9: 5. POOR APPETITE OR OVEREATING: SEVERAL DAYS

## 2018-04-09 ASSESSMENT — ANXIETY QUESTIONNAIRES
7. FEELING AFRAID AS IF SOMETHING AWFUL MIGHT HAPPEN: NOT AT ALL
2. NOT BEING ABLE TO STOP OR CONTROL WORRYING: SEVERAL DAYS
6. BECOMING EASILY ANNOYED OR IRRITABLE: NOT AT ALL
3. WORRYING TOO MUCH ABOUT DIFFERENT THINGS: SEVERAL DAYS
5. BEING SO RESTLESS THAT IT IS HARD TO SIT STILL: NOT AT ALL
GAD7 TOTAL SCORE: 4
1. FEELING NERVOUS, ANXIOUS, OR ON EDGE: SEVERAL DAYS

## 2018-04-09 NOTE — MR AVS SNAPSHOT
After Visit Summary   4/9/2018    Amanda Sifuentes    MRN: [id number]           Patient Information     Date Of Birth          1965        Visit Information        Provider Department      4/9/2018 11:30 AM Merry Lucero DO [facility]        Care Instructions                                                         If you have any questions regarding your visit, Please contact your care team.    [facility] CLINIC HOURS TELEPHONE NUMBER   Merry Lucero DO.    DORETHA Reis -    CHELSEY Gutierrez       Monday, Wednesday, [facility] and [facility]  8:30a.m-5:00 p.m   [facility]  [address]  [place], [place] [address] [phone] ask for [facility]    Imaging [phone]       Urgent Care locations:    [facility] Saturday and Sunday   9 am - 5 pm    Monday-Friday   12 pm - 8 pm  Saturday and Sunday   9 am - 5 pm   [phone]     [facility] Labor and Delivery:  [phone]    If you need a medication refill, please contact your pharmacy. Please allow 3 business days for your refill to be completed.  As always, Thank you for trusting us with your healthcare needs!                Follow-ups after your visit        Your next 10 appointments already scheduled     Apr 09, 2018 12:30 PM CDT   (Arrive by 12:15 PM)   MA SCREENING DIGITAL BILATERAL with MGMA1, MG MA TECH   [facility] ([facility])    [address] [address] [place] [address] [phone]           Do not use any powder, lotion or deodorant under your arms or on your breast. If you do, we will ask you to remove it before your exam.  Wear comfortable, two-piece clothing.  If you have any allergies, tell your care team.  Bring any previous mammograms from other facilities or have them mailed to the breast center. Three-dimensional (3D) mammograms are  "available at [facility] locations in [place], [place], [place], [place], [facility], [place], [place], and [place]. [facility] locations include [place] and [facility] in [place]. Benefits of 3D mammograms include: - Improved rate of cancer detection - Decreases your chance of having to go back for more tests, which means fewer: - \"False-positive\" results (This means that there is an abnormal area but it isn't cancer.) - Invasive testing procedures, such as a biopsy or surgery - Can provide clearer images of the breast if you have dense breast tissue. 3D mammography is an optional exam that anyone can have with a 2D mammogram. It doesn't replace or take the place of a 2D mammogram. 2D mammograms remain an effective screening test for all women.  Not all insurance companies cover the cost of a 3D mammogram. Check with your insurance.            Apr 17, 2018  7:20 AM CDT   PHYSICAL with Noreen Ventura DO   [facility] ([address] [address]   [phone]            Apr 20, 2018  8:30 AM CDT   Nurse Only with NE ANCILLARY   [facility] ([address] [address]   [phone]            May 07, 2018  4:40 PM CDT   (Arrive by 4:25 PM)   RETURN ENDOCRINE with Kiersten Tenorio MD   [facility] ([facility])    [address] [place] [address] [phone]              Who to contact     If you have questions or need follow up information about today's clinic visit or your schedule please contact [facility] directly at [phone].  Normal or non-critical lab and imaging results will be communicated to you by MyChart, letter or phone within 4 business days after the clinic has received the results. If you do not hear from us within 7 days, please " "contact the clinic through [employer] or phone. If you have a critical or abnormal lab result, we will notify you by phone as soon as possible.  Submit refill requests through [employer] or call your pharmacy and they will forward the refill request to us. Please allow 3 business days for your refill to be completed.          Additional Information About Your Visit        [employer] Information     [employer] gives you secure access to your electronic health record. If you see a primary care provider, you can also send messages to your care team and make appointments. If you have questions, please call your primary care clinic.  If you do not have a primary care provider, please call [phone] and they will assist you.        Care EveryWhere ID     This is your Care EveryWhere ID. This could be used by other organizations to access your [place] medical records  [id number]        Your Vitals Were     Pulse Height Pulse Oximetry Breastfeeding? BMI (Body Mass Index)       89 5' 4.75\" (1.645 m) 97% No 24.08 kg/m2        Blood Pressure from Last 3 Encounters:   04/09/18 145/78   12/11/17 124/72   05/22/17 127/79    Weight from Last 3 Encounters:   04/09/18 143 lb 9.6 oz (65.1 kg)   12/11/17 150 lb (68 kg)   05/22/17 142 lb 8 oz (64.6 kg)              Today, you had the following     No orders found for display         Today's Medication Changes          These changes are accurate as of 4/9/18 11:30 AM.  If you have any questions, ask your nurse or doctor.               Stop taking these medicines if you haven't already. Please contact your care team if you have questions.     citalopram 20 MG tablet   Commonly known as:  celeXA   Stopped by:  Merry Lucero DO                    Primary Care Provider Office Phone # Fax #    Noreen Ventura [phone]       [facility] [address]        Equal Access to Services     RD FOSTER AH: leo Raymond " shreya cameron terrencealonzo rowell ah. So [facility] [phone].    ATENCIÓN: Si yee ugarte, tiene a rabago disposición servicios gratuitos de asistencia lingüística. Pily al [phone].    We comply with applicable federal civil rights laws and [place] laws. We do not discriminate on the basis of race, color, national origin, age, disability, sex, sexual orientation, or gender identity.            Thank you!     Thank you for choosing [facility]  for your care. Our goal is always to provide you with excellent care. Hearing back from our patients is one way we can continue to improve our services. Please take a few minutes to complete the written survey that you may receive in the mail after your visit with us. Thank you!             Your Updated Medication List - Protect others around you: Learn how to safely use, store and throw away your medicines at www.disposemymeds.org.          This list is accurate as of 4/9/18 11:30 AM.  Always use your most recent med list.                   Brand Name Dispense Instructions for use Diagnosis    ascorbic acid 1000 MG Tabs    vitamin C     Take 1 tablet by mouth daily.        benzoyl peroxide 5 % topical gel     60 g    Apply topically daily    Acne vulgaris       Calcium Carb-Cholecalciferol 600-800 MG-UNIT Tabs      Dose unknown    High serum parathyroid hormone (PTH), History of irradiation, presenting hazards to health       cimetidine 400 MG tablet    TAGAMET    30 tablet    Take 1 tablet (400 mg) by mouth At Bedtime    Other type of migraine       desonide 0.05 % ointment    DESOWEN    30 g    Apply thin layer to affected area BID.    Acne vulgaris       estrogens (conjugated) 0.625 MG tablet    PREMARIN    90 tablet    Take 1 tablet (0.625 mg) by mouth daily    Menopausal syndrome (hot flashes)       GLUCOSAMINE CHONDROITIN Tabs     300 tab    TAKE 3 TABLETS (1500MG-GLUCOSAMINE) DAILY        Multi-vitamin Tabs  tablet   Generic drug:  multivitamin, therapeutic with minerals     35    1 TABLET DAILY    OTC -PATIENT CHOICE       nitroGLYcerin 2 % Oint ointment    NITRO-BID    15 g    3-4 times a day. Use with 9 parts petroleum and apply to hemmrhoids.    Hemorrhoids, unspecified hemorrhoid type       omega-3 fatty acids 1200 MG capsule      Take 1 capsule by mouth daily.        omeprazole 40 MG capsule    priLOSEC    90 capsule    Take 1 capsule (40 mg) by mouth daily    Gastroesophageal reflux disease without esophagitis       SUMAtriptan 25 MG tablet    IMITREX    9 tablet    TAKE 1 TABLET BY MOUTH AT ONSET OF MIGRAINE. MAY REPEAT IN 2 HOURS IF NEEDED. MAX OF 2 PER DAY.    Other migraine, not intractable, without status migrainosus       vitamin B complex with vitamin C Tabs tablet      Take 1 tablet by mouth daily        vitamin E 400 UNIT capsule      Take 1 capsule by mouth daily.

## 2018-04-09 NOTE — PROGRESS NOTES
Amanda is a 53 year old female, , who is here for her annual exam and is new to the [facility] gyn dept.  She is s/p MANDY with BSO at age 26 for the treatment of endometriosis and was advised to continue pap smear testing every 3 years.  However, she has 2 cervices but denies any hx of abnormal pap smears.  She already had her MA this morning as well as a Dermatology appt.  She has an appt with her PCP next week so prefers to hold off on labwork today.  She will be due for colonoscopy in May 2018 so will call back to schedule.      ROS: Ten point review of systems was reviewed and negative except the above.    Health Maintenance   Topic Date Due     ADVANCE DIRECTIVE PLANNING Q5 YRS  2018     COLONOSCOPY Q5 YR  2018     INFLUENZA VACCINE (SYSTEM ASSIGNED)  2018     MAMMO SCREEN Q2 YR (SYSTEM ASSIGNED)  2019     PAP Q5 YEARS  2020     HPV Q5 YEARS (Complete with PAP)  2020     LIPID MONITORING Q5 YEARS  2022     TETANUS Q10 YR  2023     MIGRAINE ACTION PLAN  Completed     HEPATITIS C SCREENING  Completed      Last pap: 2/9/15 normal  Last Mammogram: this morning  Last Dexa: not applicable  Last Colonoscopy: due in May 2018 and pt is aware and will call back to schedule  Lab Results   Component Value Date    CHOL 247 2017     Lab Results   Component Value Date    HDL 69 2017     Lab Results   Component Value Date     2017     Lab Results   Component Value Date    TRIG 101 2017     Lab Results   Component Value Date    CHOLHDLRATIO 3.5 2015         OBHX:      PSH:   Past Surgical History:   Procedure Laterality Date     COLONOSCOPY  2013    Procedure: COLONOSCOPY;;  Surgeon: Jim Flores MD;  Location: [facility]     HC EXCISION BREAST LESION, OPEN >=1      benign     HEMORRHOID SURGERY       HYSTERECTOMY, PAP STILL INDICATED      benign/with both ovaries(cervix in)         PMH: Her past medical, surgical, and obstetric  "histories were reviewed and are documented in their appropriate chart areas.    ALL/Meds: Her medication and allergy histories were reviewed and are documented in their appropriate chart areas.    SH/FMH: Her social and family history was reviewed and documented in its appropriate chart area.    PE: /78  Pulse 89  Ht 5' 4.75\" (1.645 m)  Wt 143 lb 9.6 oz (65.1 kg)  SpO2 97%  Breastfeeding? No  BMI 24.08 kg/m2  Body mass index is 24.08 kg/(m^2).    General Appearance:  healthy, alert, active, no distress  Cardiovascular:  Regular rate and Rhythm without murmur  Neck: Supple, no adenopathy and thyroid normal  Lungs:  Clear, without wheeze, rale or rhonchi  Breast: normal breast exam, scar in upper aspect of right breast s/p biopsy (benign)  Abdomen: Benign, Soft, flat, non-tender, No masses, organomegaly, No inguinal nodes and Bowel sounds normoactive.   Pelvic:       - Ext: Vulva and perineum are normal without lesion, mass or discharge        - Urethra: normal without discharge        - Urethral Meatus: normal appearance       - Bladder: no tenderness, no masses       - Vagina: Normal mucosa, no discharge        - Cervix: normal and nulliparous       - Uterus:Normal shape, position and consistency        - Adnexa: Normal without masses or tenderness       - Rectal: Normal without palpable mass    A/P:  Well Woman Exam     -  I discussed the new pap recommendations regarding screening.  Explained the rationale for increased intervals between paps.  Questions asked and answered.  She does agree to this regiment.  Paps were obtained from the right and left cervices and submitted to pathology   -  BC: s/p MANDY with BSO but still retains 2 cervices   -  Schedule colonoscopy in May 2018   -  She will have labwork drawn at her PCP's office next week   -  Awaiting mammogram results  Orders Placed This Encounter   Procedures     Pap imaged thin layer screen with HPV - recommended age 30 - 65 years (select HPV order " below)     HPV High Risk Types DNA Cervical     Pap imaged thin layer screen with HPV - recommended age 30 - 65 years (select HPV order below)     HPV High Risk Types DNA Cervical      - Encouraged self-breast exam   - Encouraged low fat diet, regular exercise, and adequate calcium intake.    Merry Lucero DO  FACOG, FACS

## 2018-04-09 NOTE — PATIENT INSTRUCTIONS
If you have any questions regarding your visit, Please contact your care team.    [facility] CLINIC HOURS TELEPHONE NUMBER   Merry Lucero DO.    DORETHA Reis -    CHELSEY Gutierrez       Monday, Wednesday, Thursday and Friday, [place]  8:30a.m-5:00 p.m   [facility]  [address] [place] [address]  [phone] ask for [facility]    Imaging [phone]       Urgent Care locations:    [facility] Saturday and Sunday   9 am - 5 pm    Monday-Friday   12 pm - 8 pm  Saturday and Sunday   9 am - 5 pm   [phone]     [facility] Labor and Delivery:  [phone]    If you need a medication refill, please contact your pharmacy. Please allow 3 business days for your refill to be completed.  As always, Thank you for trusting us with your healthcare needs!

## 2018-04-10 ASSESSMENT — ANXIETY QUESTIONNAIRES: GAD7 TOTAL SCORE: 4

## 2018-04-10 ASSESSMENT — PATIENT HEALTH QUESTIONNAIRE - PHQ9: SUM OF ALL RESPONSES TO PHQ QUESTIONS 1-9: 2

## 2018-04-11 LAB
COPATH REPORT: NORMAL
COPATH REPORT: NORMAL
PAP: NORMAL
PAP: NORMAL

## 2018-04-13 LAB
FINAL DIAGNOSIS: NORMAL
FINAL DIAGNOSIS: NORMAL
HPV HR 12 DNA CVX QL NAA+PROBE: NEGATIVE
HPV HR 12 DNA CVX QL NAA+PROBE: NEGATIVE
HPV16 DNA SPEC QL NAA+PROBE: NEGATIVE
HPV16 DNA SPEC QL NAA+PROBE: NEGATIVE
HPV18 DNA SPEC QL NAA+PROBE: NEGATIVE
HPV18 DNA SPEC QL NAA+PROBE: NEGATIVE
SPECIMEN DESCRIPTION: NORMAL
SPECIMEN DESCRIPTION: NORMAL
SPECIMEN SOURCE CVX/VAG CYTO: NORMAL
SPECIMEN SOURCE CVX/VAG CYTO: NORMAL

## 2018-04-17 ENCOUNTER — OFFICE VISIT (OUTPATIENT)
Dept: FAMILY MEDICINE | Facility: CLINIC | Age: 53
End: 2018-04-17
Payer: COMMERCIAL

## 2018-04-17 VITALS
SYSTOLIC BLOOD PRESSURE: 128 MMHG | HEART RATE: 74 BPM | TEMPERATURE: 98.2 F | HEIGHT: 65 IN | BODY MASS INDEX: 23.32 KG/M2 | DIASTOLIC BLOOD PRESSURE: 72 MMHG | WEIGHT: 140 LBS

## 2018-04-17 DIAGNOSIS — Z11.1 TUBERCULOSIS SCREENING: ICD-10-CM

## 2018-04-17 DIAGNOSIS — K21.9 GASTROESOPHAGEAL REFLUX DISEASE WITHOUT ESOPHAGITIS: ICD-10-CM

## 2018-04-17 DIAGNOSIS — E78.5 SERUM LIPIDS HIGH: ICD-10-CM

## 2018-04-17 DIAGNOSIS — Z12.11 SPECIAL SCREENING FOR MALIGNANT NEOPLASMS, COLON: ICD-10-CM

## 2018-04-17 DIAGNOSIS — L70.0 ACNE VULGARIS: ICD-10-CM

## 2018-04-17 DIAGNOSIS — Z00.00 ENCOUNTER FOR ROUTINE ADULT HEALTH EXAMINATION WITHOUT ABNORMAL FINDINGS: Primary | ICD-10-CM

## 2018-04-17 DIAGNOSIS — N95.1 MENOPAUSAL SYNDROME (HOT FLASHES): ICD-10-CM

## 2018-04-17 DIAGNOSIS — Z11.1 SCREENING EXAMINATION FOR PULMONARY TUBERCULOSIS: ICD-10-CM

## 2018-04-17 DIAGNOSIS — K64.9 HEMORRHOIDS, UNSPECIFIED HEMORRHOID TYPE: ICD-10-CM

## 2018-04-17 DIAGNOSIS — Z13.1 SCREENING FOR DIABETES MELLITUS: ICD-10-CM

## 2018-04-17 DIAGNOSIS — G43.809 OTHER MIGRAINE, NOT INTRACTABLE, WITHOUT STATUS MIGRAINOSUS: ICD-10-CM

## 2018-04-17 LAB
ANION GAP SERPL CALCULATED.3IONS-SCNC: 9 MMOL/L (ref 3–14)
BUN SERPL-MCNC: 12 MG/DL (ref 7–30)
CALCIUM SERPL-MCNC: 9.6 MG/DL (ref 8.5–10.1)
CHLORIDE SERPL-SCNC: 110 MMOL/L (ref 94–109)
CHOLEST SERPL-MCNC: 284 MG/DL
CO2 SERPL-SCNC: 25 MMOL/L (ref 20–32)
CREAT SERPL-MCNC: 0.69 MG/DL (ref 0.52–1.04)
GFR SERPL CREATININE-BSD FRML MDRD: 88 ML/MIN/1.7M2
GLUCOSE SERPL-MCNC: 89 MG/DL (ref 70–99)
HDLC SERPL-MCNC: 74 MG/DL
HGB BLD-MCNC: 13.6 G/DL (ref 11.7–15.7)
LDLC SERPL CALC-MCNC: 189 MG/DL
NONHDLC SERPL-MCNC: 210 MG/DL
POTASSIUM SERPL-SCNC: 4.5 MMOL/L (ref 3.4–5.3)
SODIUM SERPL-SCNC: 144 MMOL/L (ref 133–144)
TRIGL SERPL-MCNC: 103 MG/DL

## 2018-04-17 PROCEDURE — 36415 COLL VENOUS BLD VENIPUNCTURE: CPT | Performed by: FAMILY MEDICINE

## 2018-04-17 PROCEDURE — 80048 BASIC METABOLIC PNL TOTAL CA: CPT | Performed by: FAMILY MEDICINE

## 2018-04-17 PROCEDURE — 86580 TB INTRADERMAL TEST: CPT | Performed by: FAMILY MEDICINE

## 2018-04-17 PROCEDURE — 99396 PREV VISIT EST AGE 40-64: CPT | Performed by: FAMILY MEDICINE

## 2018-04-17 PROCEDURE — 85018 HEMOGLOBIN: CPT | Performed by: FAMILY MEDICINE

## 2018-04-17 PROCEDURE — 80061 LIPID PANEL: CPT | Performed by: FAMILY MEDICINE

## 2018-04-17 PROCEDURE — 99207 ZZC FOR CODING REVIEW: CPT | Mod: 25 | Performed by: FAMILY MEDICINE

## 2018-04-17 RX ORDER — CIMETIDINE 400 MG
400 TABLET ORAL AT BEDTIME
Qty: 30 TABLET | Refills: 11 | Status: SHIPPED | OUTPATIENT
Start: 2018-04-17 | End: 2019-04-22

## 2018-04-17 RX ORDER — DESONIDE 0.5 MG/G
OINTMENT TOPICAL
Qty: 30 G | Refills: 1 | Status: SHIPPED | OUTPATIENT
Start: 2018-04-17 | End: 2019-04-22

## 2018-04-17 RX ORDER — BENZOYL PEROXIDE 5 G/100G
GEL TOPICAL DAILY
Qty: 60 G | Refills: 2 | Status: SHIPPED | OUTPATIENT
Start: 2018-04-17 | End: 2020-05-21

## 2018-04-17 RX ORDER — OMEPRAZOLE 40 MG/1
40 CAPSULE, DELAYED RELEASE ORAL DAILY
Qty: 90 CAPSULE | Refills: 3 | Status: SHIPPED | OUTPATIENT
Start: 2018-04-17 | End: 2019-04-22

## 2018-04-17 RX ORDER — SUMATRIPTAN 25 MG/1
TABLET, FILM COATED ORAL
Qty: 9 TABLET | Refills: 0 | Status: SHIPPED | OUTPATIENT
Start: 2018-04-17 | End: 2018-06-17

## 2018-04-17 NOTE — PROGRESS NOTES
SUBJECTIVE:   CC: Amanda Sifuentes is an 53 year old woman who presents for preventive health visit.     Physical   Annual:     Getting at least 3 servings of Calcium per day::  Yes    Bi-annual eye exam::  Yes    Dental care twice a year::  Yes    Sleep apnea or symptoms of sleep apnea::  None    Diet::  Regular (no restrictions)    Frequency of exercise::  2-3 days/week    Duration of exercise::  45-60 minutes    Taking medications regularly::  Yes    Medication side effects::  Not applicable    Additional concerns today::  No                Patient is due soon for colonoscopy, please place order.   Patient is fasting for labs   Patient needs mantoux, Read scheduled for Friday      Acne stable, refill meds  Headache -stable, no side effects with current meds,   S/p hysterectomy with BSO-recently had visit with gyn but they never refilled her estrogen, refill today, mammo done this year  High cholesterol-reviewed results today  Today's PHQ-2 Score:   PHQ-2 ( 1999 [employer]) 4/17/2018   Q1: Little interest or pleasure in doing things 1   Q2: Feeling down, depressed or hopeless 0   PHQ-2 Score 1   Q1: Little interest or pleasure in doing things Not at all   Q2: Feeling down, depressed or hopeless Several days   PHQ-2 Score 1       Abuse: Current or Past(Physical, Sexual or Emotional)- No  Do you feel safe in your environment - Yes    Social History   Substance Use Topics     Smoking status: Never Smoker     Smokeless tobacco: Never Used     Alcohol use Yes      Comment: 3 drinks per wek      Alcohol Use 4/17/2018   If you drink alcohol do you typically have greater than 3 drinks per day OR greater than 7 drinks per week? No   No flowsheet data found.    Reviewed orders with patient.  Reviewed health maintenance and updated orders accordingly - Yes  Labs reviewed in EPIC    Mammogram not appropriate for this patient based on age.    Pertinent mammograms are reviewed under the imaging tab.  History of abnormal Pap  "smear: NO - age 30-65 PAP every 5 years with negative HPV co-testing recommended    Reviewed and updated as needed this visit by clinical staff  Tobacco  Allergies  Meds  Med Hx  Surg Hx  Fam Hx  Soc Hx        Reviewed and updated as needed this visit by Provider        Past Medical History:   Diagnosis Date     Esophageal reflux      Family history of colon cancer      Globus sensation      Multiple thyroid nodules     ,  FNAB benign     Other forms of migraine, without mention of intractable migraine without mention of status migrainosus      Unspecified hemorrhoids without mention of complication       Past Surgical History:   Procedure Laterality Date     COLONOSCOPY  2013    Procedure: COLONOSCOPY;;  Surgeon: Jim Flores MD;  Location:  GI     HC EXCISION BREAST LESION, OPEN >=1      benign     HEMORRHOID SURGERY       HYSTERECTOMY, PAP STILL INDICATED      benign/with both ovaries(cervix in)     Obstetric History       T0      L0     SAB0   TAB0   Ectopic0   Multiple0   Live Births0           Review of Systems  C: NEGATIVE for fever, chills, change in weight  I: NEGATIVE for worrisome rashes, moles or lesions  E: NEGATIVE for vision changes or irritation  ENT: NEGATIVE for ear, mouth and throat problems  R: NEGATIVE for significant cough or SOB  B: NEGATIVE for masses, tenderness or discharge  CV: NEGATIVE for chest pain, palpitations or peripheral edema  GI: NEGATIVE for nausea, abdominal pain, heartburn, or change in bowel habits  : NEGATIVE for unusual urinary or vaginal symptoms. No vaginal bleeding.  M: NEGATIVE for significant arthralgias or myalgia  N: NEGATIVE for weakness, dizziness or paresthesias  P: NEGATIVE for changes in mood or affect      OBJECTIVE:   /72  Pulse 74  Temp 98.2  F (36.8  C) (Oral)  Ht 5' 4.75\" (1.645 m)  Wt 140 lb (63.5 kg)  BMI 23.48 kg/m2  Physical Exam  GENERAL: healthy, alert and no distress  EYES: Eyes grossly normal " to inspection, PERRL and conjunctivae and sclerae normal  HENT: ear canals and TM's normal, nose and mouth without ulcers or lesions  NECK: no adenopathy, no asymmetry, masses, or scars and thyroid normal to palpation  RESP: lungs clear to auscultation - no rales, rhonchi or wheezes  BREAST: normal without masses, tenderness or nipple discharge and no palpable axillary masses or adenopathy  CV: regular rate and rhythm, normal S1 S2, no S3 or S4, no murmur, click or rub, no peripheral edema and peripheral pulses strong  ABDOMEN: soft, nontender, no hepatosplenomegaly, no masses and bowel sounds normal  -declined as gyn just saw her  MS: no gross musculoskeletal defects noted, no edema  SKIN: no suspicious lesions or rashes  NEURO: Normal strength and tone, mentation intact and speech normal  PSYCH: mentation appears normal, affect normal/bright    ASSESSMENT/PLAN:       ICD-10-CM    1. Encounter for routine adult health examination without abnormal findings Z00.00 Basic metabolic panel     Hemoglobin   2. Acne vulgaris L70.0 benzoyl peroxide 5 % topical gel     desonide (DESOWEN) 0.05 % ointment   3. Menopausal syndrome (hot flashes) N95.1 estrogens, conjugated, (PREMARIN) 0.625 MG tablet   4. Hemorrhoids, unspecified hemorrhoid type K64.9 nitroGLYcerin (NITRO-BID) 2 % OINT ointment   5. Other migraine, not intractable, without status migrainosus G43.809 SUMAtriptan (IMITREX) 25 MG tablet   6. Gastroesophageal reflux disease without esophagitis K21.9 cimetidine (TAGAMET) 400 MG tablet     omeprazole (PRILOSEC) 40 MG capsule   7. Tuberculosis screening Z11.1 TB INTRADERMAL TEST   8. Special screening for malignant neoplasms, colon Z12.11 GASTROENTEROLOGY ADULT REF PROCEDURE ONLY [facility] [phone]   9. Serum lipids high E78.5 Lipid panel reflex to direct LDL Fasting   10. Screening for diabetes mellitus Z13.1 Basic metabolic panel   11. Screening examination for pulmonary tuberculosis Z11.1 CANCELED: TB  "INTRADERMAL TEST     S/p hysterectomy with BSO-mammo done this year, refilled estrogen, risks and benefits discussed, recent gyn visit. No meds were refilled at that visit  History of acne-stable on current meds, refilled  Migraine-stable on current meds, refill for now  High lipids-reviewed labs and based on her cardiac risk factor which is now (2%) we can wait and do lifestyle changes , recheck next year  GERD-advised reducing dose of PPI to 20mg for now, patient wants her 40mg refilled just in case, risks and meds reviewed, reviewed GI notes from 2015-chronic GERD recommended long term PPI.  Dietary changes also advis  hemorrhoids per patient refilled meds      COUNSELING:  Reviewed preventive health counseling, as reflected in patient instructions         reports that she has never smoked. She has never used smokeless tobacco.    Estimated body mass index is 23.48 kg/(m^2) as calculated from the following:    Height as of this encounter: 5' 4.75\" (1.645 m).    Weight as of this encounter: 140 lb (63.5 kg).       Counseling Resources:  ATP IV Guidelines  Pooled Cohorts Equation Calculator  Breast Cancer Risk Calculator  FRAX Risk Assessment  ICSI Preventive Guidelines  Dietary Guidelines for Americans, 2010  USDA's MyPlate  ASA Prophylaxis  Lung CA Screening    Noreen Ventura DO  [facility]  "

## 2018-04-17 NOTE — NURSING NOTE
The patient is asked the following questions today and these are her answers:    -Have you had a mantoux administered in the past 30 days?    No  -Have you had a previous positive Mantoux.  No  -Have you received BCG in the past.  No  -Have you had a live vaccine  (MMR, Varicella, OPV, Yellow Fever) in the last 6 weeks.  No  -Have you had and active  viral or bacterial infection in the past 6 weeks.  No  -Have you received corticosteroids or immunosuppressive agents in the past 6 weeks.  No  -Have you been diagnosed with HIV?  No  -Do you have a maglinancy?  No   Lana Wilder MA

## 2018-04-17 NOTE — LETTER
"[facility]  [address] [place] [id number] [phone]                                                                                                May 14, 2018    Amandaviktor Sifunetes  [address] [facility] [address]        Dear Ms. Sifuentes,    Your cholesterol is abnormal, please use the recommendations below and recheck labs in 6-12 months.    Ways to improve your cholesterol...    1- Eats less saturated fats (including avoiding \"trans\" fats).    2 - Eat more unsaturated fats  - found in vegetables, grains, and tree nuts.  Also by replacing butter with canola oil or olive oil.    3 - Eat more nuts.  1-2 ounces (a small handful) of almonds, walnuts, hazelnuts or pecans once a day in place of other less healthy snacks.    4 - Eat more high fiber foods - vegetables and whole grains including oat bran, oats, beans, peas, and flax seed.    5 - Eat more fish - such as salmon, tuna, mackerel, and sardines.  1 or 2 six ounce servings per week is a healthy replacement for other proteins.    6 - Exercise for at least 120 minutes per week - which is equal to 30 minutes 4 days per week.    Results for orders placed or performed in visit on 04/17/18   TB INTRADERMAL TEST   Result Value Ref Range    PPD Induration 0 0 - 5 mm    PPD Redness 0 mm    Impression    Mantoux result: Negative  Lab Results       Component                Value               Date                       PPDREDNESS               0                   04/20/2018                 PPDINDURATIO             0                   04/20/2018              Bia Marley, RN    Lipid panel reflex to direct LDL Fasting   Result Value Ref Range    Cholesterol 284 (H) <200 mg/dL    Triglycerides 103 <150 mg/dL    HDL Cholesterol 74 >49 mg/dL    LDL Cholesterol Calculated 189 (H) <100 mg/dL    Non HDL Cholesterol 210 (H) <130 mg/dL   Basic metabolic panel   Result Value Ref Range    Sodium 144 133 - 144 mmol/L    Potassium " 4.5 3.4 - 5.3 mmol/L    Chloride 110 (H) 94 - 109 mmol/L    Carbon Dioxide 25 20 - 32 mmol/L    Anion Gap 9 3 - 14 mmol/L    Glucose 89 70 - 99 mg/dL    Urea Nitrogen 12 7 - 30 mg/dL    Creatinine 0.69 0.52 - 1.04 mg/dL    GFR Estimate 88 >60 mL/min/1.7m2    GFR Estimate If Black >90 >60 mL/min/1.7m2    Calcium 9.6 8.5 - 10.1 mg/dL   Hemoglobin   Result Value Ref Range    Hemoglobin 13.6 11.7 - 15.7 g/dL           Sincerely,      Noreen Ventura, DO/mv

## 2018-04-17 NOTE — LETTER
"[facility]  [address] [place] [id number] [phone]                                                                                                April 20, 2018    Amandaviktor Sifuentes  [address] [facility] [address]        Dear Ms. Sifuentes,    Your cholesterol is abnormal, please use the recommendations below and recheck labs in 6-12 months.     Ways to improve your cholesterol...     1- Eats less saturated fats (including avoiding \"trans\" fats).     2 - Eat more unsaturated fats  - found in vegetables, grains, and tree nuts.   Also by replacing butter with canola oil or olive oil.     3 - Eat more nuts.   1-2 ounces (a small handful) of almonds, walnuts, hazelnuts or pecans once a day in place of other less healthy snacks.     4 - Eat more high fiber foods - vegetables and whole grains including oat bran, oats, beans, peas, and flax seed.     5 - Eat more fish - such as salmon, tuna, mackerel, and sardines.  1 or 2 six ounce servings per week is a healthy replacement for other proteins.     6 - Exercise for at least 120 minutes per week - which is equal to 30 minutes 4 days per week.     Results for orders placed or performed in visit on 04/17/18   TB INTRADERMAL TEST   Result Value Ref Range    PPD Induration 0 0 - 5 mm    PPD Redness 0 mm    Impression    Mantoux result: Negative  Lab Results       Component                Value               Date                       PPDREDNESS               0                   04/20/2018                 PPDINDURATIO             0                   04/20/2018              Bia Marley, RN    Lipid panel reflex to direct LDL Fasting   Result Value Ref Range    Cholesterol 284 (H) <200 mg/dL    Triglycerides 103 <150 mg/dL    HDL Cholesterol 74 >49 mg/dL    LDL Cholesterol Calculated 189 (H) <100 mg/dL    Non HDL Cholesterol 210 (H) <130 mg/dL   Basic metabolic panel   Result Value Ref Range    Sodium 144 133 - 144 mmol/L    " Potassium 4.5 3.4 - 5.3 mmol/L    Chloride 110 (H) 94 - 109 mmol/L    Carbon Dioxide 25 20 - 32 mmol/L    Anion Gap 9 3 - 14 mmol/L    Glucose 89 70 - 99 mg/dL    Urea Nitrogen 12 7 - 30 mg/dL    Creatinine 0.69 0.52 - 1.04 mg/dL    GFR Estimate 88 >60 mL/min/1.7m2    GFR Estimate If Black >90 >60 mL/min/1.7m2    Calcium 9.6 8.5 - 10.1 mg/dL   Hemoglobin   Result Value Ref Range    Hemoglobin 13.6 11.7 - 15.7 g/dL         Sincerely,      Noreen Ventura, DO/mv

## 2018-04-17 NOTE — PATIENT INSTRUCTIONS
Get labs done today  Work on diet, recheck next year before visit  colonoscopy as planned    The 10-year ASCVD risk score (Honey SILVA Jr, et al., 2013) is: 2%    Values used to calculate the score:      Age: 53 years      Sex: Female      Is Non- : No      Diabetic: No      Tobacco smoker: No      Systolic Blood Pressure: 128 mmHg      Is BP treated: No      HDL Cholesterol: 69 mg/dL      Total Cholesterol: 247 mg/dL  Your cardiac risk is low enough to work on your cholesterol level with lifestyle    Preventive Health Recommendations  Female Ages 50 - 64    Yearly exam: See your health care provider every year in order to  o Review health changes.   o Discuss preventive care.    o Review your medicines if your doctor has prescribed any.      Get a Pap test every three years (unless you have an abnormal result and your provider advises testing more often).    If you get Pap tests with HPV test, you only need to test every 5 years, unless you have an abnormal result.     You do not need a Pap test if your uterus was removed (hysterectomy) and you have not had cancer.    You should be tested each year for STDs (sexually transmitted diseases) if you're at risk.     Have a mammogram every 1 to 2 years.    Have a colonoscopy at age 50, or have a yearly FIT test (stool test). These exams screen for colon cancer.      Have a cholesterol test every 5 years, or more often if advised.    Have a diabetes test (fasting glucose) every three years. If you are at risk for diabetes, you should have this test more often.     If you are at risk for osteoporosis (brittle bone disease), think about having a bone density scan (DEXA).    Shots: Get a flu shot each year. Get a tetanus shot every 10 years.    Nutrition:     Eat at least 5 servings of fruits and vegetables each day.    Eat whole-grain bread, whole-wheat pasta and brown rice instead of white grains and rice.    Talk to your provider about Calcium and  Vitamin D.     Lifestyle    Exercise at least 150 minutes a week (30 minutes a day, 5 days a week). This will help you control your weight and prevent disease.    Limit alcohol to one drink per day.    No smoking.     Wear sunscreen to prevent skin cancer.     See your dentist every six months for an exam and cleaning.    See your eye doctor every 1 to 2 years.

## 2018-04-17 NOTE — MR AVS SNAPSHOT
After Visit Summary   4/17/2018    Amanda Sifuentes    MRN: [id number]           Patient Information     Date Of Birth          1965        Visit Information        Provider Department      4/17/2018 7:20 AM Noreen Ventura DO [facility]        Today's Diagnoses     Encounter for routine adult health examination without abnormal findings    -  1    Acne vulgaris        Menopausal syndrome (hot flashes)        Hemorrhoids, unspecified hemorrhoid type        Other migraine, not intractable, without status migrainosus        Gastroesophageal reflux disease without esophagitis        Tuberculosis screening        Special screening for malignant neoplasms, colon        Serum lipids high        Screening for diabetes mellitus        Screening examination for pulmonary tuberculosis          Care Instructions    Get labs done today  Work on diet, recheck next year before visit  colonoscopy as planned    The 10-year ASCVD risk score (Honey SILVA Jr, et al., 2013) is: 2%    Values used to calculate the score:      Age: 53 years      Sex: Female      Is Non- : No      Diabetic: No      Tobacco smoker: No      Systolic Blood Pressure: 128 mmHg      Is BP treated: No      HDL Cholesterol: 69 mg/dL      Total Cholesterol: 247 mg/dL  Your cardiac risk is low enough to work on your cholesterol level with lifestyle    Preventive Health Recommendations  Female Ages 50 - 64    Yearly exam: See your health care provider every year in order to  o Review health changes.   o Discuss preventive care.    o Review your medicines if your doctor has prescribed any.      Get a Pap test every three years (unless you have an abnormal result and your provider advises testing more often).    If you get Pap tests with HPV test, you only need to test every 5 years, unless you have an abnormal result.     You do not need a Pap test if your uterus was removed (hysterectomy) and you  have not had cancer.    You should be tested each year for STDs (sexually transmitted diseases) if you're at risk.     Have a mammogram every 1 to 2 years.    Have a colonoscopy at age 50, or have a yearly FIT test (stool test). These exams screen for colon cancer.      Have a cholesterol test every 5 years, or more often if advised.    Have a diabetes test (fasting glucose) every three years. If you are at risk for diabetes, you should have this test more often.     If you are at risk for osteoporosis (brittle bone disease), think about having a bone density scan (DEXA).    Shots: Get a flu shot each year. Get a tetanus shot every 10 years.    Nutrition:     Eat at least 5 servings of fruits and vegetables each day.    Eat whole-grain bread, whole-wheat pasta and brown rice instead of white grains and rice.    Talk to your provider about Calcium and Vitamin D.     Lifestyle    Exercise at least 150 minutes a week (30 minutes a day, 5 days a week). This will help you control your weight and prevent disease.    Limit alcohol to one drink per day.    No smoking.     Wear sunscreen to prevent skin cancer.     See your dentist every six months for an exam and cleaning.    See your eye doctor every 1 to 2 years.            Follow-ups after your visit        Additional Services     GASTROENTEROLOGY ADULT REF PROCEDURE ONLY [facility] [phone]       Last Lab Result: Creatinine (mg/dL)       Date                     Value                 04/19/2017               0.66             ----------  Body mass index is 23.48 kg/(m^2).     Needed:  No  Language:  English    Patient will be contacted to schedule procedure.     Please be aware that coverage of these services is subject to the terms and limitations of your health insurance plan.  Call member services at your health plan with any benefit or coverage questions.  Any procedures must be performed at a [place] facility OR coordinated by your clinic's  referral office.    Please bring the following with you to your appointment:    (1) Any X-Rays, CTs or MRIs which have been performed.  Contact the facility where they were done to arrange for  prior to your scheduled appointment.    (2) List of current medications   (3) This referral request   (4) Any documents/labs given to you for this referral                  Your next 10 appointments already scheduled     Apr 20, 2018  8:30 AM CDT   Nurse Only with [facility]LLARY   [facility]    [address] [place] [id number] [phone]            May 07, 2018  4:40 PM CDT   (Arrive by 4:25 PM)   RETURN ENDOCRINE with Kiersten Tenorio MD   [facility] ([facility] [place])    [address] [place] [address]  3rd [facility] [address] [phone]              Who to contact     If you have questions or need follow up information about today's clinic visit or your schedule please contact [facility] directly at [phone].  Normal or non-critical lab and imaging results will be communicated to you by [employer], letter or phone within 4 business days after the clinic has received the results. If you do not hear from us within 7 days, please contact the clinic through [employer] or phone. If you have a critical or abnormal lab result, we will notify you by phone as soon as possible.  Submit refill requests through [employer] or call your pharmacy and they will forward the refill request to us. Please allow 3 business days for your refill to be completed.          Additional Information About Your Visit        [employer] Information     [employer] gives you secure access to your electronic health record. If you see a primary care provider, you can also send messages to your care team and make appointments. If you have questions, please call your primary care clinic.  If you do not have a primary care provider, please  "call [phone] and they will assist you.        Care EveryWhere ID     This is your Care EveryWhere ID. This could be used by other organizations to access your [place] medical records  [id number]        Your Vitals Were     Pulse Temperature Height BMI (Body Mass Index)          74 98.2  F (36.8  C) (Oral) 5' 4.75\" (1.645 m) 23.48 kg/m2         Blood Pressure from Last 3 Encounters:   04/17/18 128/72   04/09/18 145/78   12/11/17 124/72    Weight from Last 3 Encounters:   04/17/18 140 lb (63.5 kg)   04/09/18 143 lb 9.6 oz (65.1 kg)   12/11/17 150 lb (68 kg)              We Performed the Following     Basic metabolic panel     GASTROENTEROLOGY ADULT REF PROCEDURE ONLY [facility] [phone]     Hemoglobin     Lipid panel reflex to direct LDL Fasting     TB INTRADERMAL TEST     TB INTRADERMAL TEST          Today's Medication Changes          These changes are accurate as of 4/17/18  8:01 AM.  If you have any questions, ask your nurse or doctor.               These medicines have changed or have updated prescriptions.        Dose/Directions    SUMAtriptan 25 MG tablet   Commonly known as:  IMITREX   This may have changed:  See the new instructions.   Used for:  Other migraine, not intractable, without status migrainosus   Changed by:  Noreen Ventura, DO        TAKE 1 TABLET BY MOUTH AT ONSET OF MIGRAINE. MAY REPEAT IN 2 HOURS IF NEEDED. MAX OF 2 PER DAY.   Quantity:  9 tablet   Refills:  0            Where to get your medicines      These medications were sent to [facility] [id number] [facility]  [address]  [address] [place] [address]     Phone:  [phone]     benzoyl peroxide 5 % topical gel    cimetidine 400 MG tablet    desonide 0.05 % ointment    estrogens (conjugated) 0.625 MG tablet    nitroGLYcerin 2 % Oint ointment    omeprazole 40 MG capsule    SUMAtriptan 25 MG tablet                Primary Care Provider Office Phone # Fax #    Noreen Ventura [phone] " [phone]       [address] [facility] [address]        Equal Access to Services     RD FOSTER : Hadii aad ku hadavikaro Jordan, wasoilada shreya, qaybta katrevinangelic raygozajamalangelic, alonzo dumontparijudi edmonds. So [facility] [phone].    ATENCIÓN: Si habla español, tiene a rabago disposición servicios gratuitos de asistencia lingüística. Pily al [phone].    We comply with applicable federal civil rights laws and [place] laws. We do not discriminate on the basis of race, color, national origin, age, disability, sex, sexual orientation, or gender identity.            Thank you!     Thank you for choosing [facility]  for your care. Our goal is always to provide you with excellent care. Hearing back from our patients is one way we can continue to improve our services. Please take a few minutes to complete the written survey that you may receive in the mail after your visit with us. Thank you!             Your Updated Medication List - Protect others around you: Learn how to safely use, store and throw away your medicines at www.disposemymeds.org.          This list is accurate as of 4/17/18  8:01 AM.  Always use your most recent med list.                   Brand Name Dispense Instructions for use Diagnosis    ascorbic acid 1000 MG Tabs    vitamin C     Take 1 tablet by mouth daily.        benzoyl peroxide 5 % topical gel     60 g    Apply topically daily    Acne vulgaris       Calcium Carb-Cholecalciferol 600-800 MG-UNIT Tabs      Dose unknown    High serum parathyroid hormone (PTH), History of irradiation, presenting hazards to health       cimetidine 400 MG tablet    TAGAMET    30 tablet    Take 1 tablet (400 mg) by mouth At Bedtime    Gastroesophageal reflux disease without esophagitis       desonide 0.05 % ointment    DESOWEN    30 g    Apply thin layer to affected area BID.    Acne vulgaris       estrogens (conjugated) 0.625 MG tablet    PREMARIN    90 tablet    Take 1  tablet (0.625 mg) by mouth daily    Menopausal syndrome (hot flashes)       GLUCOSAMINE CHONDROITIN Tabs     300 tab    TAKE 3 TABLETS (1500MG-GLUCOSAMINE) DAILY        Multi-vitamin Tabs tablet   Generic drug:  multivitamin, therapeutic with minerals     35    1 TABLET DAILY    OTC -PATIENT CHOICE       nitroGLYcerin 2 % Oint ointment    NITRO-BID    15 g    3-4 times a day. Use with 9 parts petroleum and apply to hemmrhoids.    Hemorrhoids, unspecified hemorrhoid type       omega-3 fatty acids 1200 MG capsule      Take 1 capsule by mouth daily.        omeprazole 40 MG capsule    priLOSEC    90 capsule    Take 1 capsule (40 mg) by mouth daily    Gastroesophageal reflux disease without esophagitis       SUMAtriptan 25 MG tablet    IMITREX    9 tablet    TAKE 1 TABLET BY MOUTH AT ONSET OF MIGRAINE. MAY REPEAT IN 2 HOURS IF NEEDED. MAX OF 2 PER DAY.    Other migraine, not intractable, without status migrainosus       vitamin B complex with vitamin C Tabs tablet      Take 1 tablet by mouth daily        vitamin E 400 UNIT capsule      Take 1 capsule by mouth daily.

## 2018-04-20 ENCOUNTER — ALLIED HEALTH/NURSE VISIT (OUTPATIENT)
Dept: NURSING | Facility: CLINIC | Age: 53
End: 2018-04-20
Payer: COMMERCIAL

## 2018-04-20 ENCOUNTER — MYC MEDICAL ADVICE (OUTPATIENT)
Dept: FAMILY MEDICINE | Facility: CLINIC | Age: 53
End: 2018-04-20

## 2018-04-20 DIAGNOSIS — Z11.1 SCREENING EXAMINATION FOR PULMONARY TUBERCULOSIS: Primary | ICD-10-CM

## 2018-04-20 LAB
PPDINDURATION: 0 MM (ref 0–5)
PPDREDNESS: 0 MM

## 2018-04-20 PROCEDURE — 99207 ZZC NO CHARGE NURSE ONLY: CPT

## 2018-04-20 NOTE — PROGRESS NOTES
"Your cholesterol is abnormal, please use the recommendations below and recheck labs in 6-12 months.    Ways to improve your cholesterol...    1- Eats less saturated fats (including avoiding \"trans\" fats).    2 - Eat more unsaturated fats  - found in vegetables, grains, and tree nuts.   Also by replacing butter with canola oil or olive oil.    3 - Eat more nuts.   1-2 ounces (a small handful) of almonds, walnuts, hazelnuts or pecans once a  day in place of other less healthy snacks.    4 - Eat more high fiber foods - vegetables and whole grains including oat bran, oats, beans, peas, and flax seed.    5 - Eat more fish - such as salmon, tuna, mackerel, and sardines.  1 or 2 six ounce servings per week is a healthy replacement for other proteins.    6 - Exercise for at least 120 minutes per week - which is equal to 30 minutes 4 days per week.    Noreen Ventura D.O.  "

## 2018-04-20 NOTE — MR AVS SNAPSHOT
After Visit Summary   4/20/2018    Amanda Sifuentes    MRN: [id number]           Patient Information     Date Of Birth          1965        Visit Information        Provider Department      4/20/2018 8:30 AM [facility] ANCILL[facility]        Today's Diagnoses     Screening examination for pulmonary tuberculosis    -  1       Follow-ups after your visit        Your next 10 appointments already scheduled     Jul 23, 2018  4:00 PM CDT   (Arrive by 3:45 PM)   RETURN ENDOCRINE with Kiersten Tenorio MD   [facility] Endocr[facility] ([facility] [place])    [address] [place] [address]  [address] [facility] [address] [phone]              Who to contact     If you have questions or need follow up information about today's clinic visit or your schedule please contact [facility] directly at [phone].  Normal or non-critical lab and imaging results will be communicated to you by MyChart, letter or phone within 4 business days after the clinic has received the results. If you do not hear from us within 7 days, please contact the clinic through MyChart or phone. If you have a critical or abnormal lab result, we will notify you by phone as soon as possible.  Submit refill requests through [employer] or call your pharmacy and they will forward the refill request to us. Please allow 3 business days for your refill to be completed.          Additional Information About Your Visit        MyChart Information     [employer] gives you secure access to your electronic health record. If you see a primary care provider, you can also send messages to your care team and make appointments. If you have questions, please call your primary care clinic.  If you do not have a primary care provider, please call [phone] and they will assist you.        Care EveryWhere ID     This is your Care EveryWhere ID. This could be used by other organizations to access your [place]  medical records  [id number]         Blood Pressure from Last 3 Encounters:   04/17/18 128/72   04/09/18 145/78   12/11/17 124/72    Weight from Last 3 Encounters:   04/17/18 140 lb (63.5 kg)   04/09/18 143 lb 9.6 oz (65.1 kg)   12/11/17 150 lb (68 kg)              Today, you had the following     No orders found for display       Primary Care Provider Office Phone # Fax #    Noreen Ventura [phone] [address] [facility] [address]        Equal Access to Services     KRISTIN FOSTER : Hadii brodie Jordan, wasoilada lusandra, qaybta kaalmada kenzie, alonzo edmonds. So [facility] [phone].    ATENCIÓN: Si habla español, tiene a rabago disposición servicios gratuitos de asistencia lingüística. Llame al [phone].    We comply with applicable federal civil rights laws and [place] laws. We do not discriminate on the basis of race, color, national origin, age, disability, sex, sexual orientation, or gender identity.            Thank you!     Thank you for choosing [facility]  for your care. Our goal is always to provide you with excellent care. Hearing back from our patients is one way we can continue to improve our services. Please take a few minutes to complete the written survey that you may receive in the mail after your visit with us. Thank you!             Your Updated Medication List - Protect others around you: Learn how to safely use, store and throw away your medicines at www.disposemymeds.org.          This list is accurate as of 4/20/18 11:59 PM.  Always use your most recent med list.                   Brand Name Dispense Instructions for use Diagnosis    ascorbic acid 1000 MG Tabs    vitamin C     Take 1 tablet by mouth daily.        benzoyl peroxide 5 % topical gel     60 g    Apply topically daily    Acne vulgaris       Calcium Carb-Cholecalciferol 600-800 MG-UNIT Tabs      Dose unknown    High serum  parathyroid hormone (PTH), History of irradiation, presenting hazards to health       cimetidine 400 MG tablet    TAGAMET    30 tablet    Take 1 tablet (400 mg) by mouth At Bedtime    Gastroesophageal reflux disease without esophagitis       desonide 0.05 % ointment    DESOWEN    30 g    Apply thin layer to affected area BID.    Acne vulgaris       estrogens (conjugated) 0.625 MG tablet    PREMARIN    90 tablet    Take 1 tablet (0.625 mg) by mouth daily    Menopausal syndrome (hot flashes)       GLUCOSAMINE CHONDROITIN Tabs     300 tab    TAKE 3 TABLETS (1500MG-GLUCOSAMINE) DAILY        Multi-vitamin Tabs tablet   Generic drug:  multivitamin, therapeutic with minerals     35    1 TABLET DAILY    OTC -PATIENT CHOICE       nitroGLYcerin 2 % Oint ointment    NITRO-BID    15 g    3-4 times a day. Use with 9 parts petroleum and apply to hemmrhoids.    Hemorrhoids, unspecified hemorrhoid type       omega-3 fatty acids 1200 MG capsule      Take 1 capsule by mouth daily.        omeprazole 40 MG capsule    priLOSEC    90 capsule    Take 1 capsule (40 mg) by mouth daily    Gastroesophageal reflux disease without esophagitis       SUMAtriptan 25 MG tablet    IMITREX    9 tablet    TAKE 1 TABLET BY MOUTH AT ONSET OF MIGRAINE. MAY REPEAT IN 2 HOURS IF NEEDED. MAX OF 2 PER DAY.    Other migraine, not intractable, without status migrainosus       vitamin B complex with vitamin C Tabs tablet      Take 1 tablet by mouth daily        vitamin E 400 UNIT capsule      Take 1 capsule by mouth daily.

## 2018-04-20 NOTE — NURSING NOTE
Mantoux result: Negative  Lab Results   Component Value Date    PPDREDNESS 0 04/20/2018    PPDINDURATIO 0 04/20/2018     Bia Marley RN

## 2018-04-26 ENCOUNTER — TELEPHONE (OUTPATIENT)
Dept: ENDOCRINOLOGY | Facility: CLINIC | Age: 53
End: 2018-04-26
Payer: COMMERCIAL

## 2018-04-26 NOTE — TELEPHONE ENCOUNTER
[facility] Call Center    Phone Message    May a detailed message be left on voicemail: yes    Reason for Call: Other: patient had to reschedule due to work and cyndie is booking out to July wanted to make sure this is okay with Cyndie     Action Taken: Message routed to:  [facility] ([facility]): [id number]

## 2018-06-17 DIAGNOSIS — G43.809 OTHER MIGRAINE, NOT INTRACTABLE, WITHOUT STATUS MIGRAINOSUS: ICD-10-CM

## 2018-06-18 ENCOUNTER — MYC MEDICAL ADVICE (OUTPATIENT)
Dept: FAMILY MEDICINE | Facility: CLINIC | Age: 53
End: 2018-06-18

## 2018-06-18 RX ORDER — SUMATRIPTAN 25 MG/1
TABLET, FILM COATED ORAL
Qty: 9 TABLET | Refills: 4 | Status: SHIPPED | OUTPATIENT
Start: 2018-06-18 | End: 2019-04-22

## 2018-06-18 NOTE — TELEPHONE ENCOUNTER
"Requested Prescriptions   Pending Prescriptions Disp Refills     SUMAtriptan (IMITREX) 25 MG tablet [Pharmacy Med Name: SUMATRIPTAN SUCC 25 MG TABLET]  Last Written Prescription Date:  4/17/2018  Last Fill Quantity: 9 tablet,  # refills: 0   Last office visit: 4/17/2018 with prescribing provider:  ALEXANDRIA Ventura   Future Office Visit:     9 tablet 0     Sig: TAKE 1 TABLET BY MOUTH AT ONSET OF MIGRAINE. MAY REPEAT IN 2 HOURS IF NEEDED. MAX OF 2 PER DAY.    Serotonin Agonists Failed    6/17/2018  8:56 AM       Failed - Serotonin Agonist request needs review.    Please review patient's record. If patient has had 8 or more treatments in the past month, please forward to provider.         Passed - Blood pressure under 140/90 in past 12 months    BP Readings from Last 3 Encounters:   04/17/18 128/72   04/09/18 145/78   12/11/17 124/72          Passed - Recent (12 mo) or future (30 days) visit within the authorizing provider's specialty    Patient had office visit in the last 12 months or has a visit in the next 30 days with authorizing provider or within the authorizing provider's specialty.  See \"Patient Info\" tab in inbasket, or \"Choose Columns\" in Meds & Orders section of the refill encounter.           Passed - Patient is age 18 or older       Passed - No active pregnancy on record       Passed - No positive pregnancy test in past 12 months          "

## 2018-07-23 ENCOUNTER — OFFICE VISIT (OUTPATIENT)
Dept: ENDOCRINOLOGY | Facility: CLINIC | Age: 53
End: 2018-07-23
Payer: COMMERCIAL

## 2018-07-23 ENCOUNTER — HEALTH MAINTENANCE LETTER (OUTPATIENT)
Age: 53
End: 2018-07-23

## 2018-07-23 VITALS
WEIGHT: 142.7 LBS | BODY MASS INDEX: 23.78 KG/M2 | SYSTOLIC BLOOD PRESSURE: 135 MMHG | HEIGHT: 65 IN | HEART RATE: 70 BPM | DIASTOLIC BLOOD PRESSURE: 81 MMHG

## 2018-07-23 DIAGNOSIS — Z92.3 HISTORY OF IRRADIATION, PRESENTING HAZARDS TO HEALTH: ICD-10-CM

## 2018-07-23 DIAGNOSIS — E04.1 THYROID CYST: ICD-10-CM

## 2018-07-23 DIAGNOSIS — R93.89 THYROID WITH HETEROGENEOUS ECHOTEXTURE DETERMINED BY ULTRASOUND: Primary | ICD-10-CM

## 2018-07-23 ASSESSMENT — PAIN SCALES - GENERAL: PAINLEVEL: NO PAIN (0)

## 2018-07-23 NOTE — MR AVS SNAPSHOT
"              After Visit Summary   7/23/2018    Amanda Sifuentes    MRN: [id number]           Patient Information     Date Of Birth          1965        Visit Information        Provider Department      7/23/2018 4:00 PM Kiersten Tenorio MD  [facility]        Today's Diagnoses     Thyroid with heterogeneous echotexture determined by ultrasound    -  1    Thyroid cyst        History of irradiation, presenting hazards to health          Care Instructions    You can see us every few years or sooner if a concern              Follow-ups after your visit        Who to contact     Please call your clinic at [phone] to:    Ask questions about your health    Make or cancel appointments    Discuss your medicines    Learn about your test results    Speak to your doctor            Additional Information About Your Visit        [employer] Information     [employer] gives you secure access to your electronic health record. If you see a primary care provider, you can also send messages to your care team and make appointments. If you have questions, please call your primary care clinic.  If you do not have a primary care provider, please call [phone] and they will assist you.      [employer] is an electronic gateway that provides easy, online access to your medical records. With [employer], you can request a clinic appointment, read your test results, renew a prescription or communicate with your care team.     To access your existing account, please contact your [facility] or call [phone] for assistance.        Care EveryWhere ID     This is your Care EveryWhere ID. This could be used by other organizations to access your [facility] medical records  [id number]        Your Vitals Were     Pulse Height BMI (Body Mass Index)             70 1.651 m (5' 5\") 23.75 kg/m2          Blood Pressure from Last 3 Encounters:   07/23/18 135/81   04/17/18 128/72   04/09/18 145/78    Weight " from Last 3 Encounters:   07/23/18 64.7 kg (142 lb 11.2 oz)   04/17/18 63.5 kg (140 lb)   04/09/18 65.1 kg (143 lb 9.6 oz)              Today, you had the following     No orders found for display       Primary Care Provider Office Phone # Fax #    Noreen Ventura [phone]       [address] [facility] [place] [address]        Equal Access to Services     RD FOSTER : Hadii aad ku hadasho Soomaali, waaxda luqadaha, qaybta kaalmada adeegyada, waxay idiin hayaan adejermaine luu . So [facility] [phone].    ATENCIÓN: Si habla español, tiene a rabago disposición servicios gratuitos de asistencia lingüística. [facility] [phone].    We comply with applicable federal civil rights laws and [place] laws. We do not discriminate on the basis of race, color, national origin, age, disability, sex, sexual orientation, or gender identity.            Thank you!     Thank you for choosing [facility]  for your care. Our goal is always to provide you with excellent care. Hearing back from our patients is one way we can continue to improve our services. Please take a few minutes to complete the written survey that you may receive in the mail after your visit with us. Thank you!             Your Updated Medication List - Protect others around you: Learn how to safely use, store and throw away your medicines at www.disposemymeds.org.          This list is accurate as of 7/23/18 11:59 PM.  Always use your most recent med list.                   Brand Name Dispense Instructions for use Diagnosis    ascorbic acid 1000 MG Tabs    vitamin C     Take 1 tablet by mouth daily.        benzoyl peroxide 5 % topical gel     60 g    Apply topically daily    Acne vulgaris       Calcium Carb-Cholecalciferol 600-800 MG-UNIT Tabs      Dose unknown    High serum parathyroid hormone (PTH), History of irradiation, presenting hazards to health       cimetidine 400 MG tablet    TAGAMET    30 tablet    Take 1  tablet (400 mg) by mouth At Bedtime    Gastroesophageal reflux disease without esophagitis       desonide 0.05 % ointment    DESOWEN    30 g    Apply thin layer to affected area BID.    Acne vulgaris       estrogens (conjugated) 0.625 MG tablet    PREMARIN    90 tablet    Take 1 tablet (0.625 mg) by mouth daily    Menopausal syndrome (hot flashes)       GLUCOSAMINE CHONDROITIN Tabs     300 tab    TAKE 3 TABLETS (1500MG-GLUCOSAMINE) DAILY        Multi-vitamin Tabs tablet   Generic drug:  multivitamin, therapeutic with minerals     35    1 TABLET DAILY    OTC -PATIENT CHOICE       nitroGLYcerin 2 % Oint ointment    NITRO-BID    15 g    3-4 times a day. Use with 9 parts petroleum and apply to hemmrhoids.    Hemorrhoids, unspecified hemorrhoid type       omega-3 fatty acids 1200 MG capsule      Take 1 capsule by mouth daily.        omeprazole 40 MG capsule    priLOSEC    90 capsule    Take 1 capsule (40 mg) by mouth daily    Gastroesophageal reflux disease without esophagitis       SUMAtriptan 25 MG tablet    IMITREX    9 tablet    TAKE 1 TABLET BY MOUTH AT ONSET OF MIGRAINE. MAY REPEAT IN 2 HOURS IF NEEDED. MAX OF 2 PER DAY.    Other migraine, not intractable, without status migrainosus       vitamin B complex with vitamin C Tabs tablet      Take 1 tablet by mouth daily        vitamin E 400 UNIT capsule      Take 1 capsule by mouth daily.

## 2018-07-23 NOTE — LETTER
7/23/2018       RE: Amanda Sifuentes  [address] [facility] [address]     Dear Colleague,    Thank you for referring your patient, Amanda Sifuentes, to the [facility] at [facility]. Please see a copy of my visit note below.    Assessment / Plan  1. heterogeneous thyroid (with appearance of AITD)  in patient with adult age radiation exposures.  The nodules are small and none has characteristics of great concern.      2  Posterior to Right thyroid cyst - much smaller    3 History of possible irradiation exposure causing health risks. This would have been in her 20's. This may be a small risk factor for future head and neck tumors including of the thyroid or parathyroid -as per # 1 and # 2.  Both will continue to need to be watched periodically  Suggest RTC 2020 or 2021 or sooner prelliot Tenorio MD.    CHAPIS Patel presents for follow up of nodular thyroid in the context of history of ? 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  I last saw her 5/17.     We have been following her with periodic US and FNAB of thyroid nodules.    6/12/13 she had FNAB of the dominant right (# 3) and left dominant nodules (#3), as well as another left lobe nodule.  benign on all 3 nodules ([id number])    The most recent formal thyroid US was 3/13/15. When I saw her in clinic 11/15 I noted that the Anechoic cystic mass posterior to right inferior thyroid  Was larger, then 1.2 x 1.5 x 1.9 cm (2015 was 0.8 x 1 x 1.5 cm; 2013 was 0.5 x 0.6 x 0.6 cm) .  We did parathyroid related labs as I considered the possibility the cyst was a cystic parathyroid.  The parathyroid labs were normal.    We have the following tumor marker and antibody data:  10/21/09: TP0 < 10  11/1/10: IRIS < 20, calcitonin 3  3/13/15: Tg 13.8, IRIS < 0.4, TSH   11/22/16: Tg 11.7 (concurrent 11.3), IRIS < 0.4, PTH 51, vitamin D 63  4/19/17: Ca 9.2.     Review of the image performed  after the appt  5/25/17 thyroid and neck US:    Right # 1 0.8 x 0.4 x 0.9 cm , hyperechoic  (was 0.7 x 0.7 x  0.8 hyperechoic)  (was 0.8 x0.7 x 0.9 mixed)  Right # 2 cyst posterior 1.7 x 1.5 x 2.2 cm (was 0.8 x 1 x 1.5 cm   Right # 3 1 x 0.7 x 0.9 cm  (was 0.8 x0.7 x 0.9 mixed)    Left dense shadowing calcification 0.2 x 0.2 x 0.2 cm   Left # 2 0.5  X 0.3 x 0.7 cm  Left # 3  1.0 x 0.6 x 1.2 - posterior , hyperechoic with white margins (was 0.8 x0.7 x 1.0 cm )  Left # 4 0.6 x 0.4 x (was 0.5 x 0.3 x 0.7 cm)  No abnormal lymph nodes .    ROS   No neck symptoms  Voice is OK  Swallow is OK   A little hack -x  weeks  Respiratory: hasn't coughed today    Family Hx    Family History   Problem Relation Age of Onset     Diabetes Maternal Grandfather      Cancer - colorectal Maternal Grandfather      70s     Hypertension Father      Prostate Cancer Father      Cancer Father      Basal Cell Carcinoma     Cerebrovascular Disease Maternal Grandmother      Cancer - colorectal Maternal Grandmother      70s     Cardiovascular Mother      MI in her 70's(4 stents)     Depression Mother      No known thyroid cancer    Personal Hx   Behavioral history: No tobacco use.   Home environment: No secondhand tobacco smoke in home.   .   PMH   Past Medical History:   Diagnosis Date     Esophageal reflux      Family history of colon cancer      Globus sensation      Multiple thyroid nodules     2009, 2013 FNAB benign     Other forms of migraine, without mention of intractable migraine without mention of status migrainosus      Unspecified hemorrhoids without mention of complication      Past Surgical History:   Procedure Laterality Date     COLONOSCOPY  5/20/2013    Procedure: COLONOSCOPY;;  Surgeon: Jim Flores MD;  Location: [facility]     HC EXCISION BREAST LESION, OPEN >=1      benign     HEMORRHOID SURGERY       HYSTERECTOMY, PAP STILL INDICATED      benign/with both ovaries(cervix in)         Current Outpatient Prescriptions    Medication Sig Dispense Refill     ascorbic acid (VITAMIN C) 1000 MG TABS Take 1 tablet by mouth daily.       benzoyl peroxide 5 % topical gel Apply topically daily 60 g 2     Calcium Carb-Cholecalciferol 600-800 MG-UNIT TABS Dose unknown       cimetidine (TAGAMET) 400 MG tablet Take 1 tablet (400 mg) by mouth At Bedtime 30 tablet 11     desonide (DESOWEN) 0.05 % ointment Apply thin layer to affected area BID. 30 g 1     estrogens, conjugated, (PREMARIN) 0.625 MG tablet Take 1 tablet (0.625 mg) by mouth daily 90 tablet 3     GLUCOSAMINE CHONDROITIN OR TABS TAKE 3 TABLETS (1500MG-GLUCOSAMINE) DAILY 300 tab 3     MULTI-VITAMIN OR TABS 1 TABLET DAILY 35 0     nitroGLYcerin (NITRO-BID) 2 % OINT ointment 3-4 times a day. Use with 9 parts petroleum and apply to hemmrhoids. 15 g 3     Omega-3 Fatty Acids (FISH OIL) 1200 MG capsule Take 1 capsule by mouth daily.       omeprazole (PRILOSEC) 40 MG capsule Take 1 capsule (40 mg) by mouth daily 90 capsule 3     SUMAtriptan (IMITREX) 25 MG tablet TAKE 1 TABLET BY MOUTH AT ONSET OF MIGRAINE. MAY REPEAT IN 2 HOURS IF NEEDED. MAX OF 2 PER DAY. 9 tablet 4     vitamin  B complex with vitamin C (VITAMIN  B COMPLEX) TABS Take 1 tablet by mouth daily       vitamin E 400 UNIT capsule Take 1 capsule by mouth daily.     she is not on biotin      Physical Exam   GENERAL: thin middle aged woman in NAD   There were no vitals taken for this visit.  SKIN: normal  color, normal temperature, texture   HEENT: PER, no scleral icterus, eyelid retraction, stare, lid lag, proptosis or conjunctival injection.   NECK: No visible neck masses, cervical adenopathy.    I have performed real time thyroid and neck US.  The right posterior dominant cystic mass is 0.9 x 0.8 x 1 cm (much smaller than before). The rest of the thyroid is similar in appearance to 5/17. There are no abnormal cervical lymph nodes.    NEURO: Alert, moves all extremities,  gait normal,  .   DATA review:     Results for JANETT  SHAISTA RESENDIZ (MRN [id number]) as of 5/22/2017 08:58   Ref. Range 11/22/2016 17:27 4/19/2017 08:51   Sodium Latest Ref Range: 133 - 144 mmol/L  142   Potassium Latest Ref Range: 3.4 - 5.3 mmol/L  4.6   Chloride Latest Ref Range: 94 - 109 mmol/L  108   Carbon Dioxide Latest Ref Range: 20 - 32 mmol/L  27   Urea Nitrogen Latest Ref Range: 7 - 30 mg/dL  11   Creatinine Latest Ref Range: 0.52 - 1.04 mg/dL  0.66   GFR Estimate Latest Ref Range: >60 mL/min/1.7m2  >90...   GFR Estimate If Black Latest Ref Range: >60 mL/min/1.7m2  >90...   Calcium Latest Ref Range: 8.5 - 10.1 mg/dL  9.2   Anion Gap Latest Ref Range: 3 - 14 mmol/L  7   Phosphorus Latest Ref Range: 2.5 - 4.5 mg/dL 3.5    Calcium Ionized Latest Ref Range: 4.4 - 5.2 mg/dL 4.8    Cholesterol Latest Ref Range: <200 mg/dL  247 (H)   HDL Cholesterol Latest Ref Range: >49 mg/dL  69   LDL Cholesterol Calculated Latest Ref Range: <100 mg/dL  158 (H)   Non HDL Cholesterol Latest Ref Range: <130 mg/dL  178 (H)   Triglycerides Latest Ref Range: <150 mg/dL  101   TSH Latest Ref Range: 0.40 - 4.00 mU/L 2.64    Vitamin D Deficiency screening Latest Ref Range: 20 - 75 ug/L 63      Results for SHAISTA ROWLAND (MRN [id number]) as of 5/22/2017 08:58   Ref. Range 11/22/2016 17:27   Parathyroid Hormone Intact Latest Ref Range: 12 - 72 pg/mL 51       EXAMINATION: US HEAD NECK SOFT TISSUE, 5/25/2017 7:34 AM      COMPARISON: None.     HISTORY: Thyroid nodule     FINDINGS:     Lymph nodes are measured bilaterally with measurements given in  craniocaudal, transverse and AP dimensions as follows:     Right:  Level 1: Negative  Level 2: Negative  Level 3: Negative  Level 4: Negative  Level 5: Negative  Level 6: Negative  Level 7 is not well evaluated by ultrasound.     Left:  Level 1: Negative  Level 2: Normal-appearing lymph node with a fatty hilum measuring 5 x  10 x 14 mm.  Level 3: Negative  Level 4: Negative  Level 5: Negative  Level 6: Negative  Level 7 is not well evaluated by  ultrasound.         IMPRESSION:  Normal soft tissue neck ultrasound with lymph node measurements as  described above.     I have personally reviewed the examination and initial interpretation  and I agree with the findings.     TEOFILO ROSENBAUM MD    EXAMINATION: US THYROID, 5/25/2017 7:34 AM      COMPARISON: 3/13/2015, 5/21/2013     HISTORY: Thyroid cyst     Technique: Grayscale and color ultrasound imaging of the thyroid was  performed.     Findings:    Thyroid parenchyma: Normal     The right lobe of the thyroid measures: 2.4 x 2.3 x 5.5 cm      The thyroid isthmus measures: 0.3 cm      The left lobe of the thyroid measures: 1.4 x 1.3 x 5.0 cm. Coarse  calcification in the superior pole is not associated with a nodule.      Right lobe:  Nodule 1: Upper pole  Nodule measurement: 8 x 4 x 9 mm , not significantly changed  Echogenicity: Hyperechoic  Consistency: solid  Calcifications: None  Hypervascular: yes  Interval growth (>20%): no     Nodule 2: Lower pole  Nodule measurement: 17 x 15 x 22 mm , previously 8 x 10 x 15 mm 2  years ago  Echogenicity: Anechoic  Consistency: cystic  Calcifications: None  Hypervascular: no  Interval growth (>20%): yes     Nodule 3: Lower pole  Nodule measurement: 10 x 7 x 9 mm , not significantly changed  Echogenicity: Mildly hypoechoic  Consistency: solid  Calcifications: no  Hypervascular: yes  Interval growth (>20%): no     Isthmus: None     Left Lobe:   Nodule 2: Mid  Nodule measurement: 5 x 3 x 7 mm in the mid gland , not significantly  changed  Echogenicity: Hypoechoic  Consistency: solid  Calcifications: None  Hypervascular: yes  Interval growth (>20%): no     Nodule 3: Lower pole  Nodule measurement: 10 x 6 x 12 mm , minimally increased  Echogenicity: Hypoechoic-anechoic  Consistency: Could be solid or cystic located deep with posterior  acoustic enhancement  Calcifications: None  Hypervascular: no  Interval growth (>20%): no     Nodule 4: Lower pole  Nodule measurement: 6 x 4  x 7 mm , unchanged  Echogenicity: Hypoechoic-anechoic  Consistency: Could be solid or cystic located deep with posterior  acoustic enhancement  Calcifications: None  Hypervascular: no  Interval growth (>20%): no         Impression:  1. Relatively stable solid thyroid nodules.  2. Enlargement of an almost entirely cystic nodule in the right lobe.     I have personally reviewed the examination and initial interpretation  and I agree with the findings.     TEOFILO ROSENBAUM MD    Again, thank you for allowing me to participate in the care of your patient.      Sincerely,    Kiersten Tenorio MD

## 2018-07-23 NOTE — PROGRESS NOTES
Assessment / Plan  1. heterogeneous thyroid (with appearance of AITD)  in patient with adult age radiation exposures.  The nodules are small and none has characteristics of great concern.      2  Posterior to Right thyroid cyst - much smaller    3 History of possible irradiation exposure causing health risks. This would have been in her 20's. This may be a small risk factor for future head and neck tumors including of the thyroid or parathyroid -as per # 1 and # 2.  Both will continue to need to be watched periodically  Suggest RTC 2020 or 2021 or sooner hannah Tenorio MD.    CHAPIS Patel presents for follow up of nodular thyroid in the context of history of ? 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  I last saw her 5/17.     We have been following her with periodic US and FNAB of thyroid nodules.    6/12/13 she had FNAB of the dominant right (# 3) and left dominant nodules (#3), as well as another left lobe nodule.  benign on all 3 nodules ([id number])    The most recent formal thyroid US was 3/13/15. When I saw her in clinic 11/15 I noted that the Anechoic cystic mass posterior to right inferior thyroid  Was larger, then 1.2 x 1.5 x 1.9 cm (2015 was 0.8 x 1 x 1.5 cm; 2013 was 0.5 x 0.6 x 0.6 cm) .  We did parathyroid related labs as I considered the possibility the cyst was a cystic parathyroid.  The parathyroid labs were normal.    We have the following tumor marker and antibody data:  10/21/09: TP0 < 10  11/1/10: IRIS < 20, calcitonin 3  3/13/15: Tg 13.8, IRIS < 0.4, TSH   11/22/16: Tg 11.7 (concurrent 11.3), IRIS < 0.4, PTH 51, vitamin D 63  4/19/17: Ca 9.2.     Review of the image performed after the appt  5/25/17 thyroid and neck US:    Right # 1 0.8 x 0.4 x 0.9 cm , hyperechoic  (was 0.7 x 0.7 x  0.8 hyperechoic)  (was 0.8 x0.7 x 0.9 mixed)  Right # 2 cyst posterior 1.7 x 1.5 x 2.2 cm (was 0.8 x 1 x 1.5 cm   Right # 3 1 x 0.7 x 0.9 cm  (was 0.8 x0.7 x 0.9 mixed)    Left  dense shadowing calcification 0.2 x 0.2 x 0.2 cm   Left # 2 0.5  X 0.3 x 0.7 cm  Left # 3  1.0 x 0.6 x 1.2 - posterior , hyperechoic with white margins (was 0.8 x0.7 x 1.0 cm )  Left # 4 0.6 x 0.4 x (was 0.5 x 0.3 x 0.7 cm)  No abnormal lymph nodes .    ROS   No neck symptoms  Voice is OK  Swallow is OK   A little hack -x  weeks  Respiratory: hasn't coughed today    Family Hx    Family History   Problem Relation Age of Onset     Diabetes Maternal Grandfather      Cancer - colorectal Maternal Grandfather      70s     Hypertension Father      Prostate Cancer Father      Cancer Father      Basal Cell Carcinoma     Cerebrovascular Disease Maternal Grandmother      Cancer - colorectal Maternal Grandmother      70s     Cardiovascular Mother      MI in her 70's(4 stents)     Depression Mother      No known thyroid cancer    Personal Hx   Behavioral history: No tobacco use.   Home environment: No secondhand tobacco smoke in home.   .   PMH   Past Medical History:   Diagnosis Date     Esophageal reflux      Family history of colon cancer      Globus sensation      Multiple thyroid nodules     2009, 2013 FNAB benign     Other forms of migraine, without mention of intractable migraine without mention of status migrainosus      Unspecified hemorrhoids without mention of complication      Past Surgical History:   Procedure Laterality Date     COLONOSCOPY  5/20/2013    Procedure: COLONOSCOPY;;  Surgeon: Jim Flores MD;  Location: [facility]     HC EXCISION BREAST LESION, OPEN >=1      benign     HEMORRHOID SURGERY       HYSTERECTOMY, PAP STILL INDICATED      benign/with both ovaries(cervix in)         Current Outpatient Prescriptions   Medication Sig Dispense Refill     ascorbic acid (VITAMIN C) 1000 MG TABS Take 1 tablet by mouth daily.       benzoyl peroxide 5 % topical gel Apply topically daily 60 g 2     Calcium Carb-Cholecalciferol 600-800 MG-UNIT TABS Dose unknown       cimetidine (TAGAMET) 400 MG tablet Take 1 tablet  (400 mg) by mouth At Bedtime 30 tablet 11     desonide (DESOWEN) 0.05 % ointment Apply thin layer to affected area BID. 30 g 1     estrogens, conjugated, (PREMARIN) 0.625 MG tablet Take 1 tablet (0.625 mg) by mouth daily 90 tablet 3     GLUCOSAMINE CHONDROITIN OR TABS TAKE 3 TABLETS (1500MG-GLUCOSAMINE) DAILY 300 tab 3     MULTI-VITAMIN OR TABS 1 TABLET DAILY 35 0     nitroGLYcerin (NITRO-BID) 2 % OINT ointment 3-4 times a day. Use with 9 parts petroleum and apply to hemmrhoids. 15 g 3     Omega-3 Fatty Acids (FISH OIL) 1200 MG capsule Take 1 capsule by mouth daily.       omeprazole (PRILOSEC) 40 MG capsule Take 1 capsule (40 mg) by mouth daily 90 capsule 3     SUMAtriptan (IMITREX) 25 MG tablet TAKE 1 TABLET BY MOUTH AT ONSET OF MIGRAINE. MAY REPEAT IN 2 HOURS IF NEEDED. MAX OF 2 PER DAY. 9 tablet 4     vitamin  B complex with vitamin C (VITAMIN  B COMPLEX) TABS Take 1 tablet by mouth daily       vitamin E 400 UNIT capsule Take 1 capsule by mouth daily.     she is not on biotin      Physical Exam   GENERAL: thin middle aged woman in NAD   There were no vitals taken for this visit.  SKIN: normal  color, normal temperature, texture   HEENT: PER, no scleral icterus, eyelid retraction, stare, lid lag, proptosis or conjunctival injection.   NECK: No visible neck masses, cervical adenopathy.    I have performed real time thyroid and neck US.  The right posterior dominant cystic mass is 0.9 x 0.8 x 1 cm (much smaller than before). The rest of the thyroid is similar in appearance to 5/17. There are no abnormal cervical lymph nodes.    NEURO: Alert, moves all extremities,  gait normal,  .   DATA review:     Results for SHAISTA ROWLAND (MRN [id number]) as of 5/22/2017 08:58   Ref. Range 11/22/2016 17:27 4/19/2017 08:51   Sodium Latest Ref Range: 133 - 144 mmol/L  142   Potassium Latest Ref Range: 3.4 - 5.3 mmol/L  4.6   Chloride Latest Ref Range: 94 - 109 mmol/L  108   Carbon Dioxide Latest Ref Range: 20 - 32 mmol/L  27    Urea Nitrogen Latest Ref Range: 7 - 30 mg/dL  11   Creatinine Latest Ref Range: 0.52 - 1.04 mg/dL  0.66   GFR Estimate Latest Ref Range: >60 mL/min/1.7m2  >90...   GFR Estimate If Black Latest Ref Range: >60 mL/min/1.7m2  >90...   Calcium Latest Ref Range: 8.5 - 10.1 mg/dL  9.2   Anion Gap Latest Ref Range: 3 - 14 mmol/L  7   Phosphorus Latest Ref Range: 2.5 - 4.5 mg/dL 3.5    Calcium Ionized Latest Ref Range: 4.4 - 5.2 mg/dL 4.8    Cholesterol Latest Ref Range: <200 mg/dL  247 (H)   HDL Cholesterol Latest Ref Range: >49 mg/dL  69   LDL Cholesterol Calculated Latest Ref Range: <100 mg/dL  158 (H)   Non HDL Cholesterol Latest Ref Range: <130 mg/dL  178 (H)   Triglycerides Latest Ref Range: <150 mg/dL  101   TSH Latest Ref Range: 0.40 - 4.00 mU/L 2.64    Vitamin D Deficiency screening Latest Ref Range: 20 - 75 ug/L 63      Results for SHAISTA ROWLAND (MRN [id number]) as of 5/22/2017 08:58   Ref. Range 11/22/2016 17:27   Parathyroid Hormone Intact Latest Ref Range: 12 - 72 pg/mL 51       EXAMINATION: US HEAD NECK SOFT TISSUE, 5/25/2017 7:34 AM      COMPARISON: None.     HISTORY: Thyroid nodule     FINDINGS:     Lymph nodes are measured bilaterally with measurements given in  craniocaudal, transverse and AP dimensions as follows:     Right:  Level 1: Negative  Level 2: Negative  Level 3: Negative  Level 4: Negative  Level 5: Negative  Level 6: Negative  Level 7 is not well evaluated by ultrasound.     Left:  Level 1: Negative  Level 2: Normal-appearing lymph node with a fatty hilum measuring 5 x  10 x 14 mm.  Level 3: Negative  Level 4: Negative  Level 5: Negative  Level 6: Negative  Level 7 is not well evaluated by ultrasound.         IMPRESSION:  Normal soft tissue neck ultrasound with lymph node measurements as  described above.     I have personally reviewed the examination and initial interpretation  and I agree with the findings.     TEOFILO ROSENBAUM MD    EXAMINATION: US THYROID, 5/25/2017 7:34 AM       COMPARISON: 3/13/2015, 5/21/2013     HISTORY: Thyroid cyst     Technique: Grayscale and color ultrasound imaging of the thyroid was  performed.     Findings:    Thyroid parenchyma: Normal     The right lobe of the thyroid measures: 2.4 x 2.3 x 5.5 cm      The thyroid isthmus measures: 0.3 cm      The left lobe of the thyroid measures: 1.4 x 1.3 x 5.0 cm. Coarse  calcification in the superior pole is not associated with a nodule.      Right lobe:  Nodule 1: Upper pole  Nodule measurement: 8 x 4 x 9 mm , not significantly changed  Echogenicity: Hyperechoic  Consistency: solid  Calcifications: None  Hypervascular: yes  Interval growth (>20%): no     Nodule 2: Lower pole  Nodule measurement: 17 x 15 x 22 mm , previously 8 x 10 x 15 mm 2  years ago  Echogenicity: Anechoic  Consistency: cystic  Calcifications: None  Hypervascular: no  Interval growth (>20%): yes     Nodule 3: Lower pole  Nodule measurement: 10 x 7 x 9 mm , not significantly changed  Echogenicity: Mildly hypoechoic  Consistency: solid  Calcifications: no  Hypervascular: yes  Interval growth (>20%): no     Isthmus: None     Left Lobe:   Nodule 2: Mid  Nodule measurement: 5 x 3 x 7 mm in the mid gland , not significantly  changed  Echogenicity: Hypoechoic  Consistency: solid  Calcifications: None  Hypervascular: yes  Interval growth (>20%): no     Nodule 3: Lower pole  Nodule measurement: 10 x 6 x 12 mm , minimally increased  Echogenicity: Hypoechoic-anechoic  Consistency: Could be solid or cystic located deep with posterior  acoustic enhancement  Calcifications: None  Hypervascular: no  Interval growth (>20%): no     Nodule 4: Lower pole  Nodule measurement: 6 x 4 x 7 mm , unchanged  Echogenicity: Hypoechoic-anechoic  Consistency: Could be solid or cystic located deep with posterior  acoustic enhancement  Calcifications: None  Hypervascular: no  Interval growth (>20%): no         Impression:  1. Relatively stable solid thyroid nodules.  2. Enlargement  of an almost entirely cystic nodule in the right lobe.     I have personally reviewed the examination and initial interpretation  and I agree with the findings.     TEOFILO ROSENBAUM MD

## 2018-08-08 PROBLEM — E03.9 HYPOTHYROIDISM, UNSPECIFIED TYPE: Status: RESOLVED | Noted: 2017-05-22 | Resolved: 2018-08-08

## 2018-10-08 ENCOUNTER — OFFICE VISIT (OUTPATIENT)
Dept: FAMILY MEDICINE | Facility: CLINIC | Age: 53
End: 2018-10-08
Payer: COMMERCIAL

## 2018-10-08 VITALS
DIASTOLIC BLOOD PRESSURE: 86 MMHG | HEART RATE: 73 BPM | WEIGHT: 147.8 LBS | RESPIRATION RATE: 18 BRPM | SYSTOLIC BLOOD PRESSURE: 140 MMHG | TEMPERATURE: 97 F | OXYGEN SATURATION: 100 % | BODY MASS INDEX: 24.6 KG/M2

## 2018-10-08 DIAGNOSIS — B96.89 BACTERIAL SINUSITIS: Primary | ICD-10-CM

## 2018-10-08 DIAGNOSIS — J02.9 SORE THROAT: ICD-10-CM

## 2018-10-08 DIAGNOSIS — H10.9 BACTERIAL CONJUNCTIVITIS OF RIGHT EYE: ICD-10-CM

## 2018-10-08 DIAGNOSIS — J32.9 BACTERIAL SINUSITIS: Primary | ICD-10-CM

## 2018-10-08 PROCEDURE — 99213 OFFICE O/P EST LOW 20 MIN: CPT | Performed by: FAMILY MEDICINE

## 2018-10-08 RX ORDER — POLYMYXIN B SULFATE AND TRIMETHOPRIM 1; 10000 MG/ML; [USP'U]/ML
1 SOLUTION OPHTHALMIC EVERY 4 HOURS
Qty: 3 ML | Refills: 0 | Status: SHIPPED | OUTPATIENT
Start: 2018-10-08 | End: 2018-10-18

## 2018-10-08 NOTE — PROGRESS NOTES
SUBJECTIVE:   Amanda Sifuentes is a 53 year old female who presents to clinic today for the following health issues:    Eye(s) Problem      Duration: This morning    Description:  Location: right  Pain: no  Redness: YES  Discharge: YES    Accompanying signs and symptoms: Sinus pain and throat pain    History (Trauma, foreign body exposure,): None    Precipitating or alleviating factors (contact use): None    Therapies tried and outcome: Warm wash cloth    Patient presents with sore throat and red/painful right eye with green/yellow drainage that started this morning.  After she wipes the discharge away, it comes back soon after.  Patient has been afebrile.  She has had some frontal and maxillary sinus pressure with purulent nasal drainage as well.  She has had a productive cough for about 1 week now as well, no shortness of breath.    Problem list and histories reviewed & adjusted, as indicated.  Additional history: as documented    BP Readings from Last 3 Encounters:   10/08/18 140/86   07/23/18 135/81   04/17/18 128/72    Wt Readings from Last 3 Encounters:   10/08/18 147 lb 12.8 oz (67 kg)   07/23/18 142 lb 11.2 oz (64.7 kg)   04/17/18 140 lb (63.5 kg)        Reviewed and updated as needed this visit by clinical staff  Tobacco  Allergies  Meds  Problems       Reviewed and updated as needed this visit by Provider  Allergies  Meds  Problems         ROS:  Constitutional, HEENT, cardiovascular, pulmonary, gi and gu systems are negative, except as otherwise noted.    OBJECTIVE:     /86  Pulse 73  Temp 97  F (36.1  C) (Oral)  Resp 18  Wt 147 lb 12.8 oz (67 kg)  SpO2 100%  BMI 24.6 kg/m2  Body mass index is 24.6 kg/(m^2).  GENERAL: healthy, alert and no distress  EYES: Right scleral redness/edema, no light sensitivity, PERRL, EOMI  HENT: pharyngeal erythema, TM normal, maxillary sinus tenderness  NECK: no adenopathy, no asymmetry, masses, or scars and thyroid normal to palpation  RESP: lungs clear  to auscultation - no rales, rhonchi or wheezes  CV: regular rate and rhythm, normal S1 S2, no S3 or S4, no murmur, click or rub, no peripheral edema and peripheral pulses strong  SKIN: no suspicious lesions or rashes  PSYCH: mentation appears normal, affect normal/bright    ASSESSMENT/PLAN:     1. Bacterial sinusitis  Recommend rest, hydration, NSAIDS, Neti Pot, will give augmentin x 10 days  - amoxicillin-clavulanate (AUGMENTIN) 875-125 MG per tablet; Take 1 tablet by mouth 2 times daily  Dispense: 20 tablet; Refill: 0    2. Bacterial conjunctivitis of right eye  Will give abx eye drops  - trimethoprim-polymyxin b (POLYTRIM) ophthalmic solution; Place 1 drop into the right eye every 4 hours for 10 days  Dispense: 3 mL; Refill: 0      Follow up if symptoms worsen or fail to improve.     Fadi Lugo MD  [facility]

## 2018-10-08 NOTE — PATIENT INSTRUCTIONS
Bacterial Conjunctivitis    You have an infection in the membranes covering the white part of the eye. This part of the eye is called the conjunctiva. The infection is called conjunctivitis. The most common symptoms of conjunctivitis include a thick, pus-like discharge from the eye, swollen eyelids, redness, eyelids sticking together upon awakening, and a gritty or scratchy feeling in the eye. Your infection was caused by bacteria. It may be treated with medicine. With treatment, the infection takes about 7 to 10 days to resolve.  Home care    Use prescribed antibiotic eye drops or ointment as directed to treat the infection.    Apply a warm compress (towel soaked in warm water) to the affected eye 3 to 4 times a day. Do this just before applying medicine to the eye.    Use a warm, wet cloth to wipe away crusting of the eyelids in the morning. This is caused by mucus drainage during the night. You may also use saline irrigating solution or artificial tears to rinse away mucus in the eye. Do not put a patch over the eye.    Wash your hands before and after touching the infected eye. This is to prevent spreading the infection to the other eye, and to other people. Don't share your towels or washcloths with others.    You may use acetaminophen or ibuprofen to control pain, unless another medicine was prescribed. (Note: If you have chronic liver or kidney disease or have ever had a stomach ulcer or gastrointestinal bleeding, talk with your doctor before using these medicines.)    Don't wear contact lenses until your eyes have healed and all symptoms are gone.  Follow-up care  Follow up with your healthcare provider, or as advised.  When to seek medical advice  Call your healthcare provider right away if any of these occur:    Worsening vision    Increasing pain in the eye    Increasing swelling or redness of the eyelid    Redness spreading around the eye  Date Last Reviewed: 7/1/2017 2000-2017 [employer]  [place]. [address] [place] [id number]. All rights reserved. This information is not intended as a substitute for professional medical care. Always follow your healthcare professional's instructions.        Acute Bacterial Rhinosinusitis (ABRS)    Acute bacterial rhinosinusitis (ABRS) is an infection of your nasal cavity and sinuses. It s caused by bacteria. Acute means that you ve had symptoms for less than 4 weeks, but possibly up to 12 weeks.  Understanding your sinuses  The nasal cavity is the large air-filled space behind your nose. The sinuses are a group of spaces formed by the bones of your face. They connect with your nasal cavity. ABRS causes the tissue lining these spaces to become inflamed. Mucus may not drain normally. This leads to facial pain and other symptoms.  What causes ABRS?  ABRS most often follows an upper respiratory infection caused by a virus. Bacteria then infect the lining of your nasal cavity and sinuses. But you can also get ABRS if you have:    Nasal allergies    Long-term nasal swelling and congestion not caused by allergies    Blockage in the nose  Symptoms of ABRS  The symptoms of ABRS may be different for each person and include:    Nasal congestion or blockage    Pain or pressure in the face    Thick, colored drainage from the nose  Other symptoms may include:    Runny nose    Fluid draining from the nose down the throat (postnasal drip)    Headache    Cough    Pain    Fever  Diagnosing ABRS  ABRS may be diagnosed if you ve had an upper respiratory infection like a cold and cough for 10 or more days without improvement or with worsening symptoms. Your healthcare provider will ask about your symptoms and your medical history. The provider will check your vital signs, including your temperature. You ll have a physical exam. The healthcare provider will check your ears, nose, and throat. You likely won t need any tests. If ABRS comes back, you may have a culture or other  tests.  Treatment for ABRS  Treatment may include:    Antibiotic medicine. This is for symptoms that last for at least 10 to 14 days.    Nasal corticosteroid medicine. Drops or spray used in the nose can lessen swelling and congestion.    Over-the-counter pain medicine. This is to lessen sinus pain and pressure.    Nasal decongestant medicine. Spray or drops may help to lessen congestion. Do not use them for more than a few days.    Salt wash (saline irrigation). This can help to loosen mucus.  Possible complications of ABRS  ABRS may come back or become long-term (chronic). In rare cases, ABRS may cause complications such as:     Inflamed tissue around the brain and spinal cord (meningitis)    Inflamed tissue around the eyes (orbital cellulitis)    Inflamed bones around the sinuses (osteitis)  These problems may need to be treated in a hospital with intravenous (IV) antibiotic medicine or surgery.  When to call the healthcare provider  Call your healthcare provider if you have any of the following:    Symptoms that don t get better, or get worse    Symptoms that don t get better after 3 to 5 days on antibiotics    Trouble seeing    Swelling around your eyes    Confusion or trouble staying awake   Date Last Reviewed: 5/1/2017 2000-2017 [employer]. [address] [place] [address], [place] [address]. All rights reserved. This information is not intended as a substitute for professional medical care. Always follow your healthcare professional's instructions.

## 2018-10-08 NOTE — MR AVS SNAPSHOT
After Visit Summary   10/8/2018    Amanda Sifuentes    MRN: [id number]           Patient Information     Date Of Birth          1965        Visit Information        Provider Department      10/8/2018 8:20 AM Fadi Torres MD [facility] [place]        Today's Diagnoses     Sore throat    -  1    Bacterial sinusitis        Bacterial conjunctivitis of right eye          Care Instructions      Bacterial Conjunctivitis    You have an infection in the membranes covering the white part of the eye. This part of the eye is called the conjunctiva. The infection is called conjunctivitis. The most common symptoms of conjunctivitis include a thick, pus-like discharge from the eye, swollen eyelids, redness, eyelids sticking together upon awakening, and a gritty or scratchy feeling in the eye. Your infection was caused by bacteria. It may be treated with medicine. With treatment, the infection takes about 7 to 10 days to resolve.  Home care    Use prescribed antibiotic eye drops or ointment as directed to treat the infection.    Apply a warm compress (towel soaked in warm water) to the affected eye 3 to 4 times a day. Do this just before applying medicine to the eye.    Use a warm, wet cloth to wipe away crusting of the eyelids in the morning. This is caused by mucus drainage during the night. You may also use saline irrigating solution or artificial tears to rinse away mucus in the eye. Do not put a patch over the eye.    Wash your hands before and after touching the infected eye. This is to prevent spreading the infection to the other eye, and to other people. Don't share your towels or washcloths with others.    You may use acetaminophen or ibuprofen to control pain, unless another medicine was prescribed. (Note: If you have chronic liver or kidney disease or have ever had a stomach ulcer or gastrointestinal bleeding, talk with your doctor before using these medicines.)    Don't  wear contact lenses until your eyes have healed and all symptoms are gone.  Follow-up care  Follow up with your healthcare provider, or as advised.  When to seek medical advice  Call your healthcare provider right away if any of these occur:    Worsening vision    Increasing pain in the eye    Increasing swelling or redness of the eyelid    Redness spreading around the eye  Date Last Reviewed: 7/1/2017 2000-2017 [employer]. [address] [place] [address]. All rights reserved. This information is not intended as a substitute for professional medical care. Always follow your healthcare professional's instructions.        Acute Bacterial Rhinosinusitis (ABRS)    Acute bacterial rhinosinusitis (ABRS) is an infection of your nasal cavity and sinuses. It s caused by bacteria. Acute means that you ve had symptoms for less than 4 weeks, but possibly up to 12 weeks.  Understanding your sinuses  The nasal cavity is the large air-filled space behind your nose. The sinuses are a group of spaces formed by the bones of your face. They connect with your nasal cavity. ABRS causes the tissue lining these spaces to become inflamed. Mucus may not drain normally. This leads to facial pain and other symptoms.  What causes ABRS?  ABRS most often follows an upper respiratory infection caused by a virus. Bacteria then infect the lining of your nasal cavity and sinuses. But you can also get ABRS if you have:    Nasal allergies    Long-term nasal swelling and congestion not caused by allergies    Blockage in the nose  Symptoms of ABRS  The symptoms of ABRS may be different for each person and include:    Nasal congestion or blockage    Pain or pressure in the face    Thick, colored drainage from the nose  Other symptoms may include:    Runny nose    Fluid draining from the nose down the throat (postnasal drip)    Headache    Cough    Pain    Fever  Diagnosing ABRS  ABRS may be diagnosed if you ve had an upper  respiratory infection like a cold and cough for 10 or more days without improvement or with worsening symptoms. Your healthcare provider will ask about your symptoms and your medical history. The provider will check your vital signs, including your temperature. You ll have a physical exam. The healthcare provider will check your ears, nose, and throat. You likely won t need any tests. If ABRS comes back, you may have a culture or other tests.  Treatment for ABRS  Treatment may include:    Antibiotic medicine. This is for symptoms that last for at least 10 to 14 days.    Nasal corticosteroid medicine. Drops or spray used in the nose can lessen swelling and congestion.    Over-the-counter pain medicine. This is to lessen sinus pain and pressure.    Nasal decongestant medicine. Spray or drops may help to lessen congestion. Do not use them for more than a few days.    Salt wash (saline irrigation). This can help to loosen mucus.  Possible complications of ABRS  ABRS may come back or become long-term (chronic). In rare cases, ABRS may cause complications such as:     Inflamed tissue around the brain and spinal cord (meningitis)    Inflamed tissue around the eyes (orbital cellulitis)    Inflamed bones around the sinuses (osteitis)  These problems may need to be treated in a hospital with intravenous (IV) antibiotic medicine or surgery.  When to call the healthcare provider  Call your healthcare provider if you have any of the following:    Symptoms that don t get better, or get worse    Symptoms that don t get better after 3 to 5 days on antibiotics    Trouble seeing    Swelling around your eyes    Confusion or trouble staying awake   Date Last Reviewed: 5/1/2017 2000-2017 [employer]. [address] [place] [address], [place] [address]. All rights reserved. This information is not intended as a substitute for professional medical care. Always follow your healthcare professional's instructions.                 Follow-ups after your visit        Follow-up notes from your care team     Return if symptoms worsen or fail to improve.      Who to contact     If you have questions or need follow up information about today's clinic visit or your schedule please contact [facility] [place] directly at [phone].  Normal or non-critical lab and imaging results will be communicated to you by MyChart, letter or phone within 4 business days after the clinic has received the results. If you do not hear from us within 7 days, please contact the clinic through [employer]hart or phone. If you have a critical or abnormal lab result, we will notify you by phone as soon as possible.  Submit refill requests through [employer] or call your pharmacy and they will forward the refill request to us. Please allow 3 business days for your refill to be completed.          Additional Information About Your Visit        MyChart Information     [employer] gives you secure access to your electronic health record. If you see a primary care provider, you can also send messages to your care team and make appointments. If you have questions, please call your primary care clinic.  If you do not have a primary care provider, please call [phone] and they will assist you.        Care EveryWhere ID     This is your Care EveryWhere ID. This could be used by other organizations to access your [place] medical records  [id number]        Your Vitals Were     Pulse Temperature Respirations Pulse Oximetry BMI (Body Mass Index)       73 97  F (36.1  C) (Oral) 18 100% 24.6 kg/m2        Blood Pressure from Last 3 Encounters:   10/08/18 140/86   07/23/18 135/81   04/17/18 128/72    Weight from Last 3 Encounters:   10/08/18 147 lb 12.8 oz (67 kg)   07/23/18 142 lb 11.2 oz (64.7 kg)   04/17/18 140 lb (63.5 kg)              Today, you had the following     No orders found for display         Today's Medication Changes          These changes are accurate as of 10/8/18  9:25  AM.  If you have any questions, ask your nurse or doctor.               Start taking these medicines.        Dose/Directions    amoxicillin-clavulanate 875-125 MG per tablet   Commonly known as:  AUGMENTIN   Used for:  Bacterial sinusitis   Started by:  Fadi Torres MD        Dose:  1 tablet   Take 1 tablet by mouth 2 times daily   Quantity:  20 tablet   Refills:  0       trimethoprim-polymyxin b ophthalmic solution   Commonly known as:  POLYTRIM   Used for:  Bacterial conjunctivitis of right eye   Started by:  Fadi Torres MD        Dose:  1 drop   Place 1 drop into the right eye every 4 hours for 10 days   Quantity:  3 mL   Refills:  0            Where to get your medicines      These medications were sent to [place] [address] [place] [facility] [place] [place] - [address]  [address] [place] [address]     Phone:  [phone]     amoxicillin-clavulanate 875-125 MG per tablet    trimethoprim-polymyxin b ophthalmic solution                Primary Care Provider Office Phone # Fax #    Noreen Celestedavitari Lorenzo, [phone]       [facility] [address]        Equal Access to Services     [facility]: Hadii aad ku hadasho Soomaali, waaxda luqadaha, qaybta kaalmada adeegyada, alonzo bryant hayalejo luu . So [facility] [phone].    ATENCIÓN: Si habla español, tiene a rabago disposición servicios gratuitos de asistencia lingüística. Llame al [phone].    We comply with applicable federal civil rights laws and [place] laws. We do not discriminate on the basis of race, color, national origin, age, disability, sex, sexual orientation, or gender identity.            Thank you!     Thank you for choosing [facility]  for your care. Our goal is always to provide you with excellent care. Hearing back from our patients is one way we can continue to improve our services. Please take a few minutes to complete the written survey that  you may receive in the mail after your visit with us. Thank you!             Your Updated Medication List - Protect others around you: Learn how to safely use, store and throw away your medicines at www.disposemymeds.org.          This list is accurate as of 10/8/18  9:25 AM.  Always use your most recent med list.                   Brand Name Dispense Instructions for use Diagnosis    amoxicillin-clavulanate 875-125 MG per tablet    AUGMENTIN    20 tablet    Take 1 tablet by mouth 2 times daily    Bacterial sinusitis       ascorbic acid 1000 MG Tabs    vitamin C     Take 1 tablet by mouth daily.        benzoyl peroxide 5 % topical gel     60 g    Apply topically daily    Acne vulgaris       Calcium Carb-Cholecalciferol 600-800 MG-UNIT Tabs      Dose unknown    High serum parathyroid hormone (PTH), History of irradiation, presenting hazards to health       cimetidine 400 MG tablet    TAGAMET    30 tablet    Take 1 tablet (400 mg) by mouth At Bedtime    Gastroesophageal reflux disease without esophagitis       desonide 0.05 % ointment    DESOWEN    30 g    Apply thin layer to affected area BID.    Acne vulgaris       estrogens (conjugated) 0.625 MG tablet    PREMARIN    90 tablet    Take 1 tablet (0.625 mg) by mouth daily    Menopausal syndrome (hot flashes)       GLUCOSAMINE CHONDROITIN Tabs     300 tab    TAKE 3 TABLETS (1500MG-GLUCOSAMINE) DAILY        Multi-vitamin Tabs tablet   Generic drug:  multivitamin, therapeutic with minerals     35    1 TABLET DAILY    OTC -PATIENT CHOICE       nitroGLYcerin 2 % Oint ointment    NITRO-BID    15 g    3-4 times a day. Use with 9 parts petroleum and apply to hemmrhoids.    Hemorrhoids, unspecified hemorrhoid type       omega-3 fatty acids 1200 MG capsule      Take 1 capsule by mouth daily.        omeprazole 40 MG capsule    priLOSEC    90 capsule    Take 1 capsule (40 mg) by mouth daily    Gastroesophageal reflux disease without esophagitis       SUMAtriptan 25 MG tablet     IMITREX    9 tablet    TAKE 1 TABLET BY MOUTH AT ONSET OF MIGRAINE. MAY REPEAT IN 2 HOURS IF NEEDED. MAX OF 2 PER DAY.    Other migraine, not intractable, without status migrainosus       trimethoprim-polymyxin b ophthalmic solution    POLYTRIM    3 mL    Place 1 drop into the right eye every 4 hours for 10 days    Bacterial conjunctivitis of right eye       vitamin B complex with vitamin C Tabs tablet      Take 1 tablet by mouth daily        vitamin E 400 UNIT capsule      Take 1 capsule by mouth daily.

## 2018-10-30 ENCOUNTER — OFFICE VISIT (OUTPATIENT)
Dept: FAMILY MEDICINE | Facility: CLINIC | Age: 53
End: 2018-10-30
Payer: COMMERCIAL

## 2018-10-30 VITALS
WEIGHT: 144.6 LBS | DIASTOLIC BLOOD PRESSURE: 80 MMHG | SYSTOLIC BLOOD PRESSURE: 136 MMHG | RESPIRATION RATE: 16 BRPM | HEIGHT: 65 IN | HEART RATE: 77 BPM | TEMPERATURE: 97.4 F | OXYGEN SATURATION: 98 % | BODY MASS INDEX: 24.09 KG/M2

## 2018-10-30 DIAGNOSIS — Z23 NEED FOR PROPHYLACTIC VACCINATION AND INOCULATION AGAINST INFLUENZA: ICD-10-CM

## 2018-10-30 DIAGNOSIS — B34.9 VIRAL ILLNESS: Primary | ICD-10-CM

## 2018-10-30 LAB
DEPRECATED S PYO AG THROAT QL EIA: NORMAL
SPECIMEN SOURCE: NORMAL

## 2018-10-30 PROCEDURE — 99213 OFFICE O/P EST LOW 20 MIN: CPT | Performed by: NURSE PRACTITIONER

## 2018-10-30 PROCEDURE — 87081 CULTURE SCREEN ONLY: CPT | Performed by: NURSE PRACTITIONER

## 2018-10-30 PROCEDURE — 87880 STREP A ASSAY W/OPTIC: CPT | Performed by: NURSE PRACTITIONER

## 2018-10-30 ASSESSMENT — PAIN SCALES - GENERAL: PAINLEVEL: NO PAIN (0)

## 2018-10-30 NOTE — PATIENT INSTRUCTIONS
Strep test today - negative    Cold and cough syrup during the day.     [facility]   Discharged by : Mahi FENG MA    If you have any questions regarding your visit please contact your care team:     Team Gold                Clinic Hours Telephone Number     Dr. Jennifer Coon, CNP  Halima Sky, CNP 7am-7pm  Monday - Thursday   7am-5pm  Fridays  [phone]   (Appointment scheduling available 24/7)     RN Line  [phone] option 2     [facility]gent [facility] - [place] - 11am-9pm Monday-Friday Saturday-Sunday- 9am-5pm     [place] -   5pm-9pm Monday-Friday Saturday-Sunday- 9am-5pm    [phone] - [place]    [phone] - [place]       For a Price Quote for your services, please call our Consumer Price Line at [phone].     What options do I have for visits at the clinic other than the traditional office visit?     To expand how we care for you, many of our providers are utilizing electronic visits (e-visits) and telephone visits, when medically appropriate, for interactions with their patients rather than a visit in the clinic. We also offer nurse visits for many medical concerns. Just like any other service, we will bill your insurance company for this type of visit based on time spent on the phone with your provider. Not all insurance companies cover these visits. Please check with your medical insurance if this type of visit is covered. You will be responsible for any charges that are not paid by your insurance.   E-visits via [facility]: generally incur a $35.00 fee.     Telephone visits:  Time spent on the phone: *charged based on time that is spent on the phone in increments of 10 minutes. Estimated cost:   5-10 mins $30.00   11-20 mins. $59.00   21-30 mins. $85.00       Use [facility] (secure email communication and access to your chart) to send your primary care provider a message or make an  appointment. Ask someone on your Team how to sign up for BusyEvent.     As always, Thank you for trusting us with your health care needs!      [facility]:    Scheduling Appointments  [facility] [place]  Call: [phone]    [facility] Ch[facility]  Call: [phone]    [facility]  Call: [phone]    For Gastroenterology referrals   [facility]   Clinics and Surgery [place], 4th Floor   [address] [place] [address]   Appointments: [phone]    WHERE TO GO FOR CARE?  Clinic    Make an appointment if you:       Are sick (cold, cough, flu, sore throat, earache or in pain).       Have a small injury (sprain, small cut, burn or broken bone).       Need a physical exam, Pap smear, vaccine or prescription refill.       Have questions about your health or medicines.    To reach us:      Call [phone] ([phone]). Open 24 hours every day. (For counseling services, call [phone].)    Log into BusyEvent at [email or web address]. (Visit [email or web address] to create an account.) Hospital emergency room    An emergency is a serious or life- threatening problem that must be treated right away.    Call 371 or get to the hospital if you have:      Very bad or sudden:            - Chest pain or pressure         - Bleeding         - Head or belly pain         - Dizziness or trouble seeing, walking or                          Speaking      Problems breathing      Blood in your vomit or you are coughing up blood      A major injury (knocked out, loss of a finger or limb, rape, broken bone protruding from skin)    A mental health crisis. (Or call the Mental Health Crisis line at 1-904.852.9534 or Suicide Prevention Hotline at 1-339.302.6805.)    Open 24 hours every day. You don't need an appointment.     Urgent care    Visit urgent care for sickness or small injuries when the clinic is closed. You don't need an  appointment. To check hours or find an urgent care near you, visit [email or web address]. Online care    Get online [facility] from [facility] for more than 70 common problems, like colds, allergies and infections. Open 24 hours every day at:   [email or web address]   Need help deciding?    For advice about where to be seen, you may call your clinic and ask to speak with a nurse. We're here for you 24 hours every day.         If you are deaf or hard of hearing, please let us know. We provide many free services including sign language interpreters, oral interpreters, TTYs, telephone amplifiers, note takers and written materials.

## 2018-10-30 NOTE — PROGRESS NOTES
SUBJECTIVE:   Amanda Sifuentes is a 53 year old female who presents to clinic today for the following health issues:      Acute Illness   Acute illness concerns: wks   Onset: 4 wks    Fever: no    Chills/Sweats: no    Headache (location?): YES    Sinus Pressure:YES- right side     Conjunctivitis:  YES- right side, but better     Ear Pain: YES- right side with ringing     Rhinorrhea: no    Congestion: YES    Sore Throat: YES  Mucus is green in color   Cough: YES -     Wheeze: no    Decreased Appetite: no    Nausea: no    Vomiting: no    Diarrhea:  no    Dysuria/Freq.: no    Fatigue/Achiness: YES- very tired     Sick/Strep Exposure: not sure, is in and out of hosp. For work      Therapies Tried and outcome: finished antibotic 10/18/2018     Ongoing symptoms of sinus pressure, right ear is ringing, no fevers. Dry cough that has persisted. No chest pain or SOB.   Completed 10 day course of Bactrim. Says that symptoms improved overall but still coughing.   Does not smoke tobacco.     Problem list and histories reviewed & adjusted, as indicated.  Additional history: as documented    Patient Active Problem List   Diagnosis     Hemorrhoids     Other type of migraine     Esophageal reflux     Other acne     CARDIOVASCULAR SCREENING; LDL GOAL LESS THAN 160     Family history of colon cancer     History of hysterectomy for benign disease     Eczema     Poikiloderma of [place]     Solar elastosis     AK (actinic keratosis)     Hyperlipidemia LDL goal <160     Hormone replacement therapy (postmenopausal)     Actinic keratosis     Porokeratosis     Double cervix     Thyroid with heterogeneous echotexture determined by ultrasound     Thyroid cyst     History of irradiation, presenting hazards to health     Cervical cancer screening     Past Surgical History:   Procedure Laterality Date     COLONOSCOPY  5/20/2013    Procedure: COLONOSCOPY;;  Surgeon: Jim Flores MD;  Location:  GI     HC EXCISION BREAST LESION, OPEN  >=1      benign     HEMORRHOID SURGERY       HYSTERECTOMY, PAP STILL INDICATED      benign/with both ovaries(cervix in)       Social History   Substance Use Topics     Smoking status: Never Smoker     Smokeless tobacco: Never Used     Alcohol use Yes      Comment: 3 drinks per wek      Family History   Problem Relation Age of Onset     Diabetes Maternal Grandfather      Cancer - colorectal Maternal Grandfather      70s     Hypertension Father      Prostate Cancer Father      Cancer Father      Basal Cell Carcinoma     Cerebrovascular Disease Maternal Grandmother      Cancer - colorectal Maternal Grandmother      70s     Cardiovascular Mother      MI in her 70's(4 stents)     Depression Mother          Current Outpatient Prescriptions   Medication Sig Dispense Refill     ascorbic acid (VITAMIN C) 1000 MG TABS Take 1 tablet by mouth daily.       benzoyl peroxide 5 % topical gel Apply topically daily 60 g 2     Calcium Carb-Cholecalciferol 600-800 MG-UNIT TABS Dose unknown       cimetidine (TAGAMET) 400 MG tablet Take 1 tablet (400 mg) by mouth At Bedtime 30 tablet 11     desonide (DESOWEN) 0.05 % ointment Apply thin layer to affected area BID. 30 g 1     estrogens, conjugated, (PREMARIN) 0.625 MG tablet Take 1 tablet (0.625 mg) by mouth daily 90 tablet 3     GLUCOSAMINE CHONDROITIN OR TABS TAKE 3 TABLETS (1500MG-GLUCOSAMINE) DAILY 300 tab 3     MULTI-VITAMIN OR TABS 1 TABLET DAILY 35 0     nitroGLYcerin (NITRO-BID) 2 % OINT ointment 3-4 times a day. Use with 9 parts petroleum and apply to hemmrhoids. 15 g 3     Omega-3 Fatty Acids (FISH OIL) 1200 MG capsule Take 1 capsule by mouth daily.       omeprazole (PRILOSEC) 40 MG capsule Take 1 capsule (40 mg) by mouth daily 90 capsule 3     SUMAtriptan (IMITREX) 25 MG tablet TAKE 1 TABLET BY MOUTH AT ONSET OF MIGRAINE. MAY REPEAT IN 2 HOURS IF NEEDED. MAX OF 2 PER DAY. 9 tablet 4     amoxicillin-clavulanate (AUGMENTIN) 875-125 MG per tablet Take 1 tablet by mouth 2 times  "daily (Patient not taking: Reported on 10/30/2018) 20 tablet 0     vitamin  B complex with vitamin C (VITAMIN  B COMPLEX) TABS Take 1 tablet by mouth daily       vitamin E 400 UNIT capsule Take 1 capsule by mouth daily.         Reviewed and updated as needed this visit by clinical staff  Tobacco  Allergies  Meds  Soc Hx      Reviewed and updated as needed this visit by Provider         ROS:  Constitutional, HEENT, cardiovascular, pulmonary, GI, , musculoskeletal, neuro, skin, endocrine and psych systems are negative, except as otherwise noted.    OBJECTIVE:     /80 (BP Location: Right arm, Patient Position: Sitting, Cuff Size: Adult Regular)  Pulse 77  Temp 97.4  F (36.3  C) (Oral)  Resp 16  Ht 5' 5\" (1.651 m)  Wt 144 lb 9.6 oz (65.6 kg)  SpO2 98%  BMI 24.06 kg/m2  Body mass index is 24.06 kg/(m^2).  GENERAL: healthy, alert and no distress  EYES: Eyes grossly normal to inspection, PERRL and conjunctivae and sclerae normal  HENT: ear canals and TM's normal, nose and mouth without ulcers or lesions  NECK: no adenopathy, no asymmetry, masses, or scars and thyroid normal to palpation  RESP: lungs clear to auscultation - no rales, rhonchi or wheezes  CV: regular rate and rhythm, normal S1 S2, no S3 or S4, no murmur, click or rub, no peripheral edema and peripheral pulses strong  ABDOMEN: soft, nontender, no hepatosplenomegaly, no masses and bowel sounds normal  MS: no gross musculoskeletal defects noted, no edema    Diagnostic Test Results:  Strep    ASSESSMENT/PLAN:     1. Viral illness  Ongoing cough and sinus pressure. Completed 10 day course of Bactrim with symptom improvement. Cough lingering. Lungs are clear.   She is aware of when to return if symptoms do not improve or worsen.     - Strep, Rapid Screen    2. Need for prophylactic vaccination and inoculation against influenza  At work     ARMEN Flores [facility]  "

## 2018-10-30 NOTE — MR AVS SNAPSHOT
After Visit Summary   10/30/2018    Amanda Sifuentes    MRN: [id number]           Patient Information     Date Of Birth          1965        Visit Information        Provider Department      10/30/2018 7:40 AM Halima Swanson APRN CNP [facility]        Today's Diagnoses     Viral illness    -  1    Need for prophylactic vaccination and inoculation against influenza          Care Instructions    Strep test today - negative    Cold and cough syrup during the day.     [facility]   Discharged by : Mahi FENG MA    If you have any questions regarding your visit please contact your care team:     Team Gold                Clinic Hours Telephone Number     Dr. Jennifer Coon, FREDDY Swanson CNP 7am-7pm  Monday - Thursday   7am-5pm  Fridays  [phone]   (Appointment scheduling available 24/7)     RN Line  [phone] option 2     Urgent [facility] - [facility] [place] [facility] - 11am-9pm Monday-Friday Saturday-Sunday- 9am-5pm     [place] -   5pm-9pm Monday-Friday Saturday-Sunday- 9am-5pm    [phone] - [facility]    [phone] - [place]       For a Price Quote for your services, please call our Consumer Price Line at [phone].     What options do I have for visits at the clinic other than the traditional office visit?     To expand how we care for you, many of our providers are utilizing electronic visits (e-visits) and telephone visits, when medically appropriate, for interactions with their patients rather than a visit in the clinic. We also offer nurse visits for many medical concerns. Just like any other service, we will bill your insurance company for this type of visit based on time spent on the phone with your provider. Not all insurance companies cover these visits. Please check with your medical insurance if this type of visit is covered. You will be  responsible for any charges that are not paid by your insurance.   E-visits via [employer]: generally incur a $35.00 fee.     Telephone visits:  Time spent on the phone: *charged based on time that is spent on the phone in increments of 10 minutes. Estimated cost:   5-10 mins $30.00   11-20 mins. $59.00   21-30 mins. $85.00       Use [employer] (secure email communication and access to your chart) to send your primary care provider a message or make an appointment. Ask someone on your Team how to sign up for [employer].     As always, Thank you for trusting us with your health care needs!      [facility]:    Scheduling Appointments  [facility]  Call: [phone]    [facility]  Call: [phone]    [facility]  Call: [phone]    For Gastroenterology referrals   [facility] Gastroenterology   Clinics and Surgery Center, 4th Floor   [address] [place] [address]   Appointments: [phone]    WHERE TO GO FOR CARE?  Clinic    Make an appointment if you:       Are sick (cold, cough, flu, sore throat, earache or in pain).       Have a small injury (sprain, small cut, burn or broken bone).       Need a physical exam, Pap smear, vaccine or prescription refill.       Have questions about your health or medicines.    To reach us:      Call [phone] ([phone]). Open 24 hours every day. (For counseling services, call [phone].)    Log into [employer] at [email or web address]. (Visit Medical [email or web address] to create an account.) Hospital emergency room    An emergency is a serious or life- threatening problem that must be treated right away.    Call 031 or get to the hospital if you have:      Very bad or sudden:            - Chest pain or pressure         - Bleeding         - Head or belly pain         - Dizziness or trouble seeing, walking or                          Speaking      Problems breathing      Blood in  your vomit or you are coughing up blood      A major injury (knocked out, loss of a finger or limb, rape, broken bone protruding from skin)    A mental health crisis. (Or call the Mental Health Crisis line at 1-222.683.9026 or Suicide Prevention Hotline at 1-741.689.1568.)    Open 24 hours every day. You don't need an appointment.     Urgent care    Visit urgent care for sickness or small injuries when the clinic is closed. You don't need an appointment. To check hours or find an urgent care near you, visit [email or web address]. Online care    Get online care from [facility] for more than 70 common problems, like colds, allergies and infections. Open 24 hours every day at:   [email or web address]   Need help deciding?    For advice about where to be seen, you may call your clinic and ask to speak with a nurse. We're here for you 24 hours every day.         If you are deaf or hard of hearing, please let us know. We provide many free services including sign language interpreters, oral interpreters, TTYs, telephone amplifiers, note takers and written materials.                   Follow-ups after your visit        Follow-up notes from your care team     Return if symptoms worsen or fail to improve, for PRN.      Who to contact     If you have questions or need follow up information about today's clinic visit or your schedule please contact [facility] directly at [phone].  Normal or non-critical lab and imaging results will be communicated to you by MyChart, letter or phone within 4 business days after the clinic has received the results. If you do not hear from us within 7 days, please contact the clinic through MyChart or phone. If you have a critical or abnormal lab result, we will notify you by phone as soon as possible.  Submit refill requests through [employer] net or call your pharmacy and they will forward the refill request to us. Please allow 3 business days for your refill to be completed.           "Additional Information About Your Visit        MyChart Information     [facility] gives you secure access to your electronic health record. If you see a primary care provider, you can also send messages to your care team and make appointments. If you have questions, please call your primary care clinic.  If you do not have a primary care provider, please call [phone] and they will assist you.        Care EveryWhere ID     This is your Care EveryWhere ID. This could be used by other organizations to access your [place] medical records  [id number]        Your Vitals Were     Pulse Temperature Respirations Height Pulse Oximetry BMI (Body Mass Index)    77 97.4  F (36.3  C) (Oral) 16 5' 5\" (1.651 m) 98% 24.06 kg/m2       Blood Pressure from Last 3 Encounters:   10/30/18 136/80   10/08/18 140/86   07/23/18 135/81    Weight from Last 3 Encounters:   10/30/18 144 lb 9.6 oz (65.6 kg)   10/08/18 147 lb 12.8 oz (67 kg)   07/23/18 142 lb 11.2 oz (64.7 kg)              We Performed the Following     Strep, Rapid Screen        Primary Care Provider Office Phone # Fax #    Noreen Booker LorenzoDO [phone]       [address] [facility] [address]        Equal Access to Services     RD FOSTER : Hadii aad ku hadasho Soomaali, waaxda luqadaha, qaybta kaalmada adeegyada, waxay annette edmonds. So [facility] [phone].    ATENCIÓN: Si habla español, tiene a rabago disposición servicios gratuitos de asistencia lingüística. Llame al [phone].    We comply with applicable federal civil rights laws and [place] laws. We do not discriminate on the basis of race, color, national origin, age, disability, sex, sexual orientation, or gender identity.            Thank you!     Thank you for choosing [facility]  for your care. Our goal is always to provide you with excellent care. Hearing back from our patients is one way we can continue to improve our services. Please take " a few minutes to complete the written survey that you may receive in the mail after your visit with us. Thank you!             Your Updated Medication List - Protect others around you: Learn how to safely use, store and throw away your medicines at www.disposemymeds.org.          This list is accurate as of 10/30/18  8:31 AM.  Always use your most recent med list.                   Brand Name Dispense Instructions for use Diagnosis    amoxicillin-clavulanate 875-125 MG per tablet    AUGMENTIN    20 tablet    Take 1 tablet by mouth 2 times daily    Bacterial sinusitis       ascorbic acid 1000 MG Tabs    vitamin C     Take 1 tablet by mouth daily.        benzoyl peroxide 5 % topical gel     60 g    Apply topically daily    Acne vulgaris       Calcium Carb-Cholecalciferol 600-800 MG-UNIT Tabs      Dose unknown    High serum parathyroid hormone (PTH), History of irradiation, presenting hazards to health       cimetidine 400 MG tablet    TAGAMET    30 tablet    Take 1 tablet (400 mg) by mouth At Bedtime    Gastroesophageal reflux disease without esophagitis       desonide 0.05 % ointment    DESOWEN    30 g    Apply thin layer to affected area BID.    Acne vulgaris       estrogens (conjugated) 0.625 MG tablet    PREMARIN    90 tablet    Take 1 tablet (0.625 mg) by mouth daily    Menopausal syndrome (hot flashes)       GLUCOSAMINE CHONDROITIN Tabs     300 tab    TAKE 3 TABLETS (1500MG-GLUCOSAMINE) DAILY        Multi-vitamin Tabs tablet   Generic drug:  multivitamin, therapeutic with minerals     35    1 TABLET DAILY    OTC -PATIENT CHOICE       nitroGLYcerin 2 % Oint ointment    NITRO-BID    15 g    3-4 times a day. Use with 9 parts petroleum and apply to hemmrhoids.    Hemorrhoids, unspecified hemorrhoid type       omega-3 fatty acids 1200 MG capsule      Take 1 capsule by mouth daily.        omeprazole 40 MG capsule    priLOSEC    90 capsule    Take 1 capsule (40 mg) by mouth daily    Gastroesophageal reflux disease  without esophagitis       SUMAtriptan 25 MG tablet    IMITREX    9 tablet    TAKE 1 TABLET BY MOUTH AT ONSET OF MIGRAINE. MAY REPEAT IN 2 HOURS IF NEEDED. MAX OF 2 PER DAY.    Other migraine, not intractable, without status migrainosus       vitamin B complex with vitamin C Tabs tablet      Take 1 tablet by mouth daily        vitamin E 400 UNIT capsule      Take 1 capsule by mouth daily.

## 2018-10-31 LAB
BACTERIA SPEC CULT: NORMAL
SPECIMEN SOURCE: NORMAL

## 2018-12-10 ENCOUNTER — MYC MEDICAL ADVICE (OUTPATIENT)
Dept: FAMILY MEDICINE | Facility: CLINIC | Age: 53
End: 2018-12-10

## 2018-12-12 ENCOUNTER — MYC MEDICAL ADVICE (OUTPATIENT)
Dept: FAMILY MEDICINE | Facility: CLINIC | Age: 53
End: 2018-12-12

## 2018-12-12 NOTE — TELEPHONE ENCOUNTER
Patient picked up envelope, verified id, book signed gave patient envelope     .Adeline Pacheco  Patient Representative

## 2018-12-22 ENCOUNTER — OFFICE VISIT (OUTPATIENT)
Dept: URGENT CARE | Facility: URGENT CARE | Age: 53
End: 2018-12-22
Payer: COMMERCIAL

## 2018-12-22 VITALS
BODY MASS INDEX: 23.9 KG/M2 | HEART RATE: 75 BPM | TEMPERATURE: 98 F | DIASTOLIC BLOOD PRESSURE: 85 MMHG | SYSTOLIC BLOOD PRESSURE: 138 MMHG | OXYGEN SATURATION: 97 % | WEIGHT: 143.6 LBS

## 2018-12-22 DIAGNOSIS — H44.002 EYE INFECTION, LEFT: ICD-10-CM

## 2018-12-22 DIAGNOSIS — J01.90 ACUTE SINUSITIS WITH SYMPTOMS > 10 DAYS: Primary | ICD-10-CM

## 2018-12-22 PROCEDURE — 99214 OFFICE O/P EST MOD 30 MIN: CPT | Performed by: NURSE PRACTITIONER

## 2018-12-22 RX ORDER — ERYTHROMYCIN 5 MG/G
0.5 OINTMENT OPHTHALMIC AT BEDTIME
Qty: 3.5 G | Refills: 0 | Status: SHIPPED | OUTPATIENT
Start: 2018-12-22 | End: 2019-03-26

## 2018-12-22 NOTE — PROGRESS NOTES
SUBJECTIVE:  Amanda Sifuentes is a 53 year old female here with concerns about sinus infection.  She states onset of symptoms were 1 week + ago.  She has had maxillary pressure, some swelling on left.   Course of illness is worsening.   Severity moderate  Current and Associated symptoms: congestion, yellow discharge from nose  Woke up today left eye is red, slight swelling around, mild discharge.  No eye pain visual changes, no contact lens, no stye.  Predisposing factors include none. Recent treatment has included: None      Past Medical History:   Diagnosis Date     Esophageal reflux      Family history of colon cancer      Globus sensation      Multiple thyroid nodules     2009, 2013 FNAB benign     Other forms of migraine, without mention of intractable migraine without mention of status migrainosus      Unspecified hemorrhoids without mention of complication      Social History     Tobacco Use     Smoking status: Never Smoker     Smokeless tobacco: Never Used   Substance Use Topics     Alcohol use: Yes     Comment: 3 drinks per wek        ROS:  CONSTITUTIONAL:NEGATIVE for fever, chills, change in weight  INTEGUMENTARY/SKIN: NEGATIVE for worrisome rashes, moles or lesions  EYES: left eye redness, eyelid swelling  ENT/MOUTH: rhinorrhea-purulent and sinus pressure  RESP:NEGATIVE for significant cough or SOB  CV: NEGATIVE for chest pain, palpitations or peripheral edema  GI: NEGATIVE for nausea, abdominal pain, heartburn, or change in bowel habits  MUSCULOSKELETAL: NEGATIVE for significant arthralgias or myalgia  NEURO: NEGATIVE for weakness, dizziness or paresthesias  ENDOCRINE: NEGATIVE for temperature intolerance, skin/hair changes  HEME/ALLERGY/IMMUNE: NEGATIVE for bleeding problems  PSYCHIATRIC: NEGATIVE for changes in mood or affect    OBJECTIVE:  /85   Pulse 75   Temp 98  F (36.7  C) (Oral)   Wt 65.1 kg (143 lb 9.6 oz)   SpO2 97%   BMI 23.90 kg/m    Exam:GENERAL APPEARANCE: Alert and no  distress  EYES: Eyes grossly normal to inspection, left eye is red and eyelid slightly swollen   HENT: rhinorrhea yellow and frontal sinus tenderness, more on left side. Slight swollen left side   NECK: supple, nontender, no lymphadenopathy  RESP: lungs clear to auscultation - no rales, rhonchi or wheezes  CV: regular rates and rhythm, normal S1 S2, no murmur noted  SKIN: no suspicious lesions or rashes    ASSESSMENT:  (J01.90) Acute sinusitis with symptoms > 10 days  (primary encounter diagnosis)    Plan:  amoxicillin-clavulanate (AUGMENTIN)  875-125 MG tablet BID X 10 days      (H44.002) Eye infection, left    Plan: erythromycin (ROMYCIN) 5 MG/GM ophthalmic ointment,    Follow up with primary clinic if not improving    ARMEN Orta CNP

## 2018-12-28 ENCOUNTER — MYC MEDICAL ADVICE (OUTPATIENT)
Dept: FAMILY MEDICINE | Facility: CLINIC | Age: 53
End: 2018-12-28

## 2018-12-28 ENCOUNTER — OFFICE VISIT (OUTPATIENT)
Dept: OPHTHALMOLOGY | Facility: CLINIC | Age: 53
End: 2018-12-28
Payer: COMMERCIAL

## 2018-12-28 DIAGNOSIS — H10.13 ALLERGIC CONJUNCTIVITIS OF BOTH EYES: Primary | ICD-10-CM

## 2018-12-28 DIAGNOSIS — Z12.11 SPECIAL SCREENING FOR MALIGNANT NEOPLASMS, COLON: Primary | ICD-10-CM

## 2018-12-28 RX ORDER — OLOPATADINE HYDROCHLORIDE 1 MG/ML
1 SOLUTION/ DROPS OPHTHALMIC 2 TIMES DAILY
Qty: 1 BOTTLE | Refills: 11 | Status: SHIPPED | OUTPATIENT
Start: 2018-12-28 | End: 2021-01-02

## 2018-12-28 ASSESSMENT — CONF VISUAL FIELD
OD_NORMAL: 1
METHOD: COUNTING FINGERS

## 2018-12-28 ASSESSMENT — EXTERNAL EXAM - LEFT EYE: OS_EXAM: NORMAL

## 2018-12-28 ASSESSMENT — VISUAL ACUITY
OD_SC+: -1
METHOD: SNELLEN - LINEAR
OS_SC: 20/20
OD_SC: 20/25

## 2018-12-28 ASSESSMENT — SLIT LAMP EXAM - LIDS
COMMENTS: 2+ PAPILLAE
COMMENTS: 1+ PAPILLAE

## 2018-12-28 ASSESSMENT — TONOMETRY
OD_IOP_MMHG: 15
OS_IOP_MMHG: 18
IOP_METHOD: ICARE

## 2018-12-28 ASSESSMENT — EXTERNAL EXAM - RIGHT EYE: OD_EXAM: NORMAL

## 2018-12-28 NOTE — PROGRESS NOTES
Assessment/Plan  (H10.13) Allergic conjunctivitis of both eyes  (primary encounter diagnosis)  Comment: History of pink eye and rhinitis recently  Plan: olopatadine (PATANOL) 0.1 % ophthalmic solution        Discussed findings with patient. Recommended Patanol bid OU. Patient should call or RTC with any worsening of symptoms. Would consider pulse steroid if needed.       Complete documentation of historical and exam elements from today's encounter can  be found in the full encounter summary report (not reduplicated in this progress  note). I personally obtained the chief complaint(s) and history of present illness. I  confirmed and edited as necessary the review of systems, past medical/surgical  history, family history, social history, and examination findings as documented by  others; and I examined the patient myself. I personally reviewed the relevant tests,  images, and reports as documented above. I formulated and edited as necessary the  assessment and plan and discussed the findings and management plan with the  patient and family.    Kelby Wilder, OD

## 2018-12-28 NOTE — NURSING NOTE
Chief Complaints and History of Present Illnesses   Patient presents with     Watery Eyes Ou     Chief Complaint(s) and History of Present Illness(es)     Watery Eyes Ou     Laterality: left eye    Characteristics: constant    Associated symptoms: red eyes and itching (a lot in BE . )    Frequency: intermittently    Duration: weeks    Treatments tried: antibiotic drops    Response to treatment: mild improvement              Comments     Oct 8th she woke up with pink eye and crusty.  Cleared up with Polymyxin B sulfate  Last wed she felt a FBS in the LE put the same gtt in and woke up the next day with redness and swelling  Has been using the EES for the LE only  +lids swollen  States va is the same since last visit  Vanessa Matias COT 7:51 AM December 28, 2018

## 2018-12-28 NOTE — TELEPHONE ENCOUNTER
I put in another one for screening colon cancer, she would have to make sure with her insurance and her provider (GI) how they would bill it  Thanks  Noreen Ventura D.O.

## 2019-01-04 ENCOUNTER — TELEPHONE (OUTPATIENT)
Dept: GASTROENTEROLOGY | Facility: CLINIC | Age: 54
End: 2019-01-04

## 2019-01-04 DIAGNOSIS — Z12.11 SPECIAL SCREENING FOR MALIGNANT NEOPLASMS, COLON: Primary | ICD-10-CM

## 2019-01-04 NOTE — TELEPHONE ENCOUNTER
: [x] N/A   [] Yes:  Language /  ID:      with request pt contact Endoscopy Pre-assessment RN to review upcoming procedure information.  Telephone call-back number provided.    Prep Type:[x]Golyt[facility]; (per pt's -specific request) [id number] [place] [facility] [address]    Genevieve Fletcher, RN  [facility] End[facility]    Additional Information regarding appointment:     Patient scheduled for:  [] EGD  [x] Colonoscopy  [] EUS  [] Flex Sig   [] Other:     Indication for procedure: [x] Screening   []     Date/Arrival time: Fri; 1/11/19; 0645    Procedure Provider:  Gomez  Referring Provider. Lorenzo    Facility location:    [][address], 1st Floor, [address]  [x][address] [facility], 5th floor     Anticoagulants or blood thinners (per EPIC):   [x]None [] ASA 81mg [] Warfarin  [] Warfarin + Lovenox bridge [] Plavix [] Effient [] Eliquis  [] Xarelto  [] Brilinta   [] Other         LAST anticoagulant dose should be: Date/Time:     H&P / Pre op physical completed? [] N/A, [] Complete, Date , [] No,

## 2019-01-11 ENCOUNTER — HOSPITAL ENCOUNTER (OUTPATIENT)
Facility: AMBULATORY SURGERY CENTER | Age: 54
End: 2019-01-11
Attending: INTERNAL MEDICINE
Payer: COMMERCIAL

## 2019-01-11 VITALS
TEMPERATURE: 98 F | DIASTOLIC BLOOD PRESSURE: 73 MMHG | HEART RATE: 67 BPM | BODY MASS INDEX: 22.66 KG/M2 | SYSTOLIC BLOOD PRESSURE: 110 MMHG | OXYGEN SATURATION: 98 % | HEIGHT: 65 IN | WEIGHT: 136 LBS | RESPIRATION RATE: 18 BRPM

## 2019-01-11 LAB — COLONOSCOPY: NORMAL

## 2019-01-11 RX ORDER — ONDANSETRON 2 MG/ML
4 INJECTION INTRAMUSCULAR; INTRAVENOUS
Status: DISCONTINUED | OUTPATIENT
Start: 2019-01-11 | End: 2019-01-12 | Stop reason: HOSPADM

## 2019-01-11 RX ORDER — FLUMAZENIL 0.1 MG/ML
0.2 INJECTION, SOLUTION INTRAVENOUS
Status: CANCELLED | OUTPATIENT
Start: 2019-01-11 | End: 2019-01-11

## 2019-01-11 RX ORDER — NALOXONE HYDROCHLORIDE 0.4 MG/ML
.1-.4 INJECTION, SOLUTION INTRAMUSCULAR; INTRAVENOUS; SUBCUTANEOUS
Status: CANCELLED | OUTPATIENT
Start: 2019-01-11 | End: 2019-01-12

## 2019-01-11 RX ORDER — ONDANSETRON 2 MG/ML
4 INJECTION INTRAMUSCULAR; INTRAVENOUS EVERY 6 HOURS PRN
Status: CANCELLED | OUTPATIENT
Start: 2019-01-11

## 2019-01-11 RX ORDER — ONDANSETRON 4 MG/1
4 TABLET, ORALLY DISINTEGRATING ORAL EVERY 6 HOURS PRN
Status: CANCELLED | OUTPATIENT
Start: 2019-01-11

## 2019-01-11 RX ORDER — FENTANYL CITRATE 50 UG/ML
INJECTION, SOLUTION INTRAMUSCULAR; INTRAVENOUS PRN
Status: DISCONTINUED | OUTPATIENT
Start: 2019-01-11 | End: 2019-01-11 | Stop reason: HOSPADM

## 2019-01-11 RX ORDER — LIDOCAINE 40 MG/G
CREAM TOPICAL
Status: DISCONTINUED | OUTPATIENT
Start: 2019-01-11 | End: 2019-01-12 | Stop reason: HOSPADM

## 2019-01-11 ASSESSMENT — MIFFLIN-ST. JEOR: SCORE: 1212.77

## 2019-03-15 ENCOUNTER — TRANSFERRED RECORDS (OUTPATIENT)
Dept: HEALTH INFORMATION MANAGEMENT | Facility: CLINIC | Age: 54
End: 2019-03-15

## 2019-03-15 LAB — HEP C HIM: ABNORMAL

## 2019-03-26 ENCOUNTER — TRANSFERRED RECORDS (OUTPATIENT)
Dept: HEALTH INFORMATION MANAGEMENT | Facility: CLINIC | Age: 54
End: 2019-03-26

## 2019-03-26 ENCOUNTER — OFFICE VISIT (OUTPATIENT)
Dept: FAMILY MEDICINE | Facility: CLINIC | Age: 54
End: 2019-03-26
Payer: COMMERCIAL

## 2019-03-26 VITALS
WEIGHT: 141 LBS | HEART RATE: 72 BPM | BODY MASS INDEX: 23.49 KG/M2 | TEMPERATURE: 97.9 F | DIASTOLIC BLOOD PRESSURE: 64 MMHG | HEIGHT: 65 IN | SYSTOLIC BLOOD PRESSURE: 120 MMHG

## 2019-03-26 DIAGNOSIS — R09.81 NASAL CONGESTION: ICD-10-CM

## 2019-03-26 DIAGNOSIS — R76.8 POSITIVE HEPATITIS C ANTIBODY TEST: Primary | ICD-10-CM

## 2019-03-26 PROCEDURE — 36415 COLL VENOUS BLD VENIPUNCTURE: CPT | Performed by: FAMILY MEDICINE

## 2019-03-26 PROCEDURE — 99214 OFFICE O/P EST MOD 30 MIN: CPT | Performed by: FAMILY MEDICINE

## 2019-03-26 PROCEDURE — 86803 HEPATITIS C AB TEST: CPT | Performed by: FAMILY MEDICINE

## 2019-03-26 PROCEDURE — 87522 HEPATITIS C REVRS TRNSCRPJ: CPT | Performed by: FAMILY MEDICINE

## 2019-03-26 RX ORDER — FLUTICASONE PROPIONATE 50 MCG
1 SPRAY, SUSPENSION (ML) NASAL DAILY
Qty: 9.9 ML | Refills: 0 | Status: SHIPPED | OUTPATIENT
Start: 2019-03-26 | End: 2019-09-26

## 2019-03-26 ASSESSMENT — MIFFLIN-ST. JEOR: SCORE: 1224.83

## 2019-03-26 NOTE — PROGRESS NOTES
"  SUBJECTIVE:   Amanda Sifuentes is a 54 year old female who presents to clinic today for the following health issues:      Patient says she was tested positive for Hep C and would like to recheck that.  She had a negative screen for Hep C here on 4/19/2017.    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     Patient has been having some sinus pressure.  She has had some tenderness in the cheeks.  She denies nasal congestion.  She doesn't usually have allergies.        Problem list and histories reviewed & adjusted, as indicated.  Additional history: as documented    BP Readings from Last 3 Encounters:   03/26/19 120/64   01/11/19 110/73   12/22/18 138/85    Wt Readings from Last 3 Encounters:   03/26/19 64 kg (141 lb)   01/11/19 61.7 kg (136 lb)   12/22/18 65.1 kg (143 lb 9.6 oz)                    Reviewed and updated as needed this visit by clinical staff       Reviewed and updated as needed this visit by Provider         ROS:  Constitutional, HEENT, cardiovascular, pulmonary, GI, , musculoskeletal, neuro, skin, endocrine and psych systems are negative, except as otherwise noted.    OBJECTIVE:     /64 (BP Location: Right arm, Patient Position: Chair, Cuff Size: Adult Regular)   Pulse 72   Temp 97.9  F (36.6  C) (Oral)   Ht 1.642 m (5' 4.65\")   Wt 64 kg (141 lb)   BMI 23.72 kg/m    Body mass index is 23.72 kg/m .  GENERAL: healthy, alert and no distress  HENT: normal cephalic/atraumatic, ear canals and TM's normal, nasal mucosa edematous , oropharynx clear and oral mucous membranes moist  NECK: no adenopathy, no asymmetry, masses, or scars and thyroid normal to palpation  RESP: lungs clear to auscultation - no rales, rhonchi or wheezes  CV: regular rate and rhythm, normal S1 S2, no S3 or S4, no murmur, click or rub, no peripheral edema and " peripheral pulses strong  ABDOMEN: soft, nontender, no hepatosplenomegaly, no masses and bowel sounds normal  MS: no gross musculoskeletal defects noted, no edema  PSYCH: mentation appears normal, affect normal/bright    Diagnostic Test Results:  No results found for this or any previous visit (from the past 24 hour(s)).    ASSESSMENT/PLAN:     ASSESSMENT/PLAN:      ICD-10-CM    1. Positive hepatitis C antibody test R76.8 Hepatitis C antibody     Hepatitis C RNA, quantitative   2. Nasal congestion R09.81 fluticasone (FLONASE) 50 MCG/ACT nasal spray     This is likely a false positive as she had a negative Hep C a few years ago.  Will get hep C viral count and antibioty.  Will do nasal steroid for congestion.     Miya Pacheco, DO  [facility]

## 2019-03-27 LAB — HCV AB SERPL QL IA: NONREACTIVE

## 2019-03-29 LAB
HCV RNA SERPL NAA+PROBE-ACNC: NORMAL [IU]/ML
HCV RNA SERPL NAA+PROBE-LOG IU: NORMAL LOG IU/ML

## 2019-04-09 ENCOUNTER — DOCUMENTATION ONLY (OUTPATIENT)
Dept: LAB | Facility: CLINIC | Age: 54
End: 2019-04-09

## 2019-04-09 DIAGNOSIS — Z13.1 SCREENING FOR DIABETES MELLITUS: ICD-10-CM

## 2019-04-09 DIAGNOSIS — E78.00 HIGH CHOLESTEROL: Primary | ICD-10-CM

## 2019-04-17 ENCOUNTER — TELEPHONE (OUTPATIENT)
Dept: FAMILY MEDICINE | Facility: CLINIC | Age: 54
End: 2019-04-17

## 2019-04-17 NOTE — TELEPHONE ENCOUNTER
[facility] Request for Authorization:    Cidetidine 800 mg tablet      Last Written Prescription Date:  na  Last Fill Quantity: na,   # refills: na  Last Office Visit: 3/26/2019  NELLIE Potter    Future Office visit:    Next 5 appointments (look out 90 days)    Apr 19, 2019 10:00 AM CDT  Nurse Only with [facility] [address] [place] [id number]  [phone]   Apr 22, 2019 10:00 AM CDT  PHYSICAL with Noreen Ventura DO  [facility] [address] [place] [phone]  [phone]           Routing refill request to provider for review/approval because:  Drug not active on patient's medication list

## 2019-04-17 NOTE — TELEPHONE ENCOUNTER
Correction to below - medication is CIMETIDINE  This is currently found on med list but at a lower dose than the 800 mg requested by pharmacy:  cimetidine (TAGAMET) 400 MG tablet    [facility]ed [facility] who [facility] and she is not in need of a refill for this at this time. She did not request a 90 day supply and she is only taking 400 mg daily as recorded in her chart.    Phone call to [facility] for clarification. They are not sure there how the request was generated but will disregard it for now and await refill next week when sent over as part of annual physical when patient says all meds are renewed.    Rober Begum RN

## 2019-04-19 ENCOUNTER — ALLIED HEALTH/NURSE VISIT (OUTPATIENT)
Dept: NURSING | Facility: CLINIC | Age: 54
End: 2019-04-19
Payer: COMMERCIAL

## 2019-04-19 DIAGNOSIS — Z11.1 SCREENING EXAMINATION FOR PULMONARY TUBERCULOSIS: Primary | ICD-10-CM

## 2019-04-19 DIAGNOSIS — Z13.1 SCREENING FOR DIABETES MELLITUS: ICD-10-CM

## 2019-04-19 DIAGNOSIS — E78.00 HIGH CHOLESTEROL: ICD-10-CM

## 2019-04-19 PROCEDURE — 86580 TB INTRADERMAL TEST: CPT

## 2019-04-19 PROCEDURE — 36415 COLL VENOUS BLD VENIPUNCTURE: CPT | Performed by: FAMILY MEDICINE

## 2019-04-19 PROCEDURE — 80048 BASIC METABOLIC PNL TOTAL CA: CPT | Performed by: FAMILY MEDICINE

## 2019-04-19 PROCEDURE — 80061 LIPID PANEL: CPT | Performed by: FAMILY MEDICINE

## 2019-04-19 NOTE — PROGRESS NOTES
The patient is asked the following questions today and these are her answers:    -Have you had a mantoux administered in the past 30 days?    No  -Have you had a previous positive Mantoux.  No  -Have you received BCG in the past.  No  -Have you had a live vaccine  (MMR, Varicella, OPV, Yellow Fever) in the last 6 weeks.  No  -Have you had and active  viral or bacterial infection in the past 6 weeks.  No  -Have you received corticosteroids or immunosuppressive agents in the past 6 weeks.  No  -Have you been diagnosed with HIV?  No  -Do you have a maglinancy?  No     Mantoux placed at 10:02am   Celestine return 4/22 for read, and appointment with provider     Linda Clark CMA

## 2019-04-19 NOTE — LETTER
"[facility]  [address] [place] [id number] [phone]                                                                                                April 22, 2019    Amandaviktor Porteron  [address] [place] [phone]        Dear Ms. Sifuentes,    Your cholesterol is abnormal, please use the recommendations below and recheck labs in 6-12 months.     Ways to improve your cholesterol...     1- Eats less saturated fats (including avoiding \"trans\" fats).     2 - Eat more unsaturated fats  - found in vege   tables, grains, and tree nuts.   Also by replacing butter with canola oil or olive oil.     3 - Eat more nuts.   1-2 ounces (a small handful) of almonds, walnuts, hazelnuts or pecans once a day in place of other less healthy snacks.     4 - Eat more high   fiber foods - vegetables and whole grains including oat bran, oats, beans, peas, and flax seed.     5 - Eat more fish - such as salmon, tuna, mackerel, and sardines.  1 or 2 six ounce servings per week is a healthy replacement for other proteins.     6 - Exercise for at least 120 minutes per week - which is equal to 30 minutes 4 days per week.       Sincerely,      Noreen Ventura DO/hl    Results for orders placed or performed in visit on 04/19/19   Lipid panel reflex to direct LDL Fasting   Result Value Ref Range    Cholesterol 275 (H) <200 mg/dL    Triglycerides 114 <150 mg/dL    HDL Cholesterol 74 >49 mg/dL    LDL Cholesterol Calculated 178 (H) <100 mg/dL    Non HDL Cholesterol 201 (H) <130 mg/dL   Basic metabolic panel   Result Value Ref Range    Sodium 142 133 - 144 mmol/L    Potassium 5.1 3.4 - 5.3 mmol/L    Chloride 108 94 - 109 mmol/L    Carbon Dioxide 30 20 - 32 mmol/L    Anion Gap 4 3 - 14 mmol/L    Glucose 90 70 - 99 mg/dL    Urea Nitrogen 11 7 - 30 mg/dL    Creatinine 0.76 0.52 - 1.04 mg/dL    GFR Estimate 87 >60 mL/min/[1.73_m2]    GFR Estimate If Black >90 >60 mL/min/[1.73_m2]    Calcium 9.5 8.5 - 10.1 " mg/dL

## 2019-04-20 LAB
ANION GAP SERPL CALCULATED.3IONS-SCNC: 4 MMOL/L (ref 3–14)
BUN SERPL-MCNC: 11 MG/DL (ref 7–30)
CALCIUM SERPL-MCNC: 9.5 MG/DL (ref 8.5–10.1)
CHLORIDE SERPL-SCNC: 108 MMOL/L (ref 94–109)
CHOLEST SERPL-MCNC: 275 MG/DL
CO2 SERPL-SCNC: 30 MMOL/L (ref 20–32)
CREAT SERPL-MCNC: 0.76 MG/DL (ref 0.52–1.04)
GFR SERPL CREATININE-BSD FRML MDRD: 87 ML/MIN/{1.73_M2}
GLUCOSE SERPL-MCNC: 90 MG/DL (ref 70–99)
HDLC SERPL-MCNC: 74 MG/DL
LDLC SERPL CALC-MCNC: 178 MG/DL
NONHDLC SERPL-MCNC: 201 MG/DL
POTASSIUM SERPL-SCNC: 5.1 MMOL/L (ref 3.4–5.3)
SODIUM SERPL-SCNC: 142 MMOL/L (ref 133–144)
TRIGL SERPL-MCNC: 114 MG/DL

## 2019-04-22 ENCOUNTER — OFFICE VISIT (OUTPATIENT)
Dept: FAMILY MEDICINE | Facility: CLINIC | Age: 54
End: 2019-04-22
Payer: COMMERCIAL

## 2019-04-22 ENCOUNTER — ANCILLARY PROCEDURE (OUTPATIENT)
Dept: MAMMOGRAPHY | Facility: CLINIC | Age: 54
End: 2019-04-22
Attending: FAMILY MEDICINE
Payer: COMMERCIAL

## 2019-04-22 ENCOUNTER — ALLIED HEALTH/NURSE VISIT (OUTPATIENT)
Dept: NURSING | Facility: CLINIC | Age: 54
End: 2019-04-22
Payer: COMMERCIAL

## 2019-04-22 VITALS
BODY MASS INDEX: 23.32 KG/M2 | WEIGHT: 140 LBS | SYSTOLIC BLOOD PRESSURE: 112 MMHG | HEIGHT: 65 IN | DIASTOLIC BLOOD PRESSURE: 70 MMHG

## 2019-04-22 DIAGNOSIS — Z90.710 HISTORY OF HYSTERECTOMY FOR BENIGN DISEASE: ICD-10-CM

## 2019-04-22 DIAGNOSIS — K64.9 HEMORRHOIDS, UNSPECIFIED HEMORRHOID TYPE: ICD-10-CM

## 2019-04-22 DIAGNOSIS — Q51.820 DOUBLE CERVIX: ICD-10-CM

## 2019-04-22 DIAGNOSIS — Z12.31 VISIT FOR SCREENING MAMMOGRAM: ICD-10-CM

## 2019-04-22 DIAGNOSIS — Z00.01 ENCOUNTER FOR ROUTINE ADULT MEDICAL EXAM WITH ABNORMAL FINDINGS: Primary | ICD-10-CM

## 2019-04-22 DIAGNOSIS — Z11.1 SCREENING EXAMINATION FOR PULMONARY TUBERCULOSIS: Primary | ICD-10-CM

## 2019-04-22 DIAGNOSIS — G43.809 OTHER MIGRAINE, NOT INTRACTABLE, WITHOUT STATUS MIGRAINOSUS: ICD-10-CM

## 2019-04-22 DIAGNOSIS — N95.1 MENOPAUSAL SYNDROME (HOT FLASHES): ICD-10-CM

## 2019-04-22 DIAGNOSIS — L70.0 ACNE VULGARIS: ICD-10-CM

## 2019-04-22 DIAGNOSIS — K21.9 GASTROESOPHAGEAL REFLUX DISEASE WITHOUT ESOPHAGITIS: ICD-10-CM

## 2019-04-22 LAB
PPDINDURATION: 0 MM (ref 0–5)
PPDREDNESS: 0 MM

## 2019-04-22 PROCEDURE — 77063 BREAST TOMOSYNTHESIS BI: CPT | Performed by: RADIOLOGY

## 2019-04-22 PROCEDURE — 99396 PREV VISIT EST AGE 40-64: CPT | Performed by: FAMILY MEDICINE

## 2019-04-22 PROCEDURE — 99207 ZZC NO CHARGE NURSE ONLY: CPT

## 2019-04-22 PROCEDURE — 77067 SCR MAMMO BI INCL CAD: CPT | Performed by: RADIOLOGY

## 2019-04-22 RX ORDER — CIMETIDINE 400 MG
400 TABLET ORAL AT BEDTIME
Qty: 30 TABLET | Refills: 11 | Status: SHIPPED | OUTPATIENT
Start: 2019-04-22 | End: 2019-05-02

## 2019-04-22 RX ORDER — OMEPRAZOLE 40 MG/1
40 CAPSULE, DELAYED RELEASE ORAL DAILY
Qty: 90 CAPSULE | Refills: 3 | Status: SHIPPED | OUTPATIENT
Start: 2019-04-22 | End: 2020-05-21

## 2019-04-22 RX ORDER — SUMATRIPTAN 25 MG/1
TABLET, FILM COATED ORAL
Qty: 9 TABLET | Refills: 4 | Status: SHIPPED | OUTPATIENT
Start: 2019-04-22 | End: 2020-02-18

## 2019-04-22 RX ORDER — DESONIDE 0.5 MG/G
OINTMENT TOPICAL
Qty: 30 G | Refills: 1 | Status: SHIPPED | OUTPATIENT
Start: 2019-04-22 | End: 2020-05-21

## 2019-04-22 ASSESSMENT — ENCOUNTER SYMPTOMS
DYSURIA: 0
CHILLS: 0
HEADACHES: 0
HEMATOCHEZIA: 0
CONSTIPATION: 0
ABDOMINAL PAIN: 0
WEAKNESS: 0
COUGH: 0
NAUSEA: 0
HEARTBURN: 0
HEMATURIA: 0
ARTHRALGIAS: 0
FREQUENCY: 0
JOINT SWELLING: 0
MYALGIAS: 0
PARESTHESIAS: 0
SHORTNESS OF BREATH: 0
EYE PAIN: 0
SORE THROAT: 0
DIARRHEA: 0
PALPITATIONS: 0
BREAST MASS: 0
NERVOUS/ANXIOUS: 0
DIZZINESS: 0
FEVER: 0

## 2019-04-22 ASSESSMENT — MIFFLIN-ST. JEOR: SCORE: 1225.92

## 2019-04-22 NOTE — RESULT ENCOUNTER NOTE
"Your cholesterol is abnormal, please use the recommendations below and recheck labs in 6-12 months.    Ways to improve your cholesterol...    1- Eats less saturated fats (including avoiding \"trans\" fats).    2 - Eat more unsaturated fats  - found in vege  tables, grains, and tree nuts.   Also by replacing butter with canola oil or olive oil.    3 - Eat more nuts.   1-2 ounces (a small handful) of almonds, walnuts, hazelnuts or pecans once a  day in place of other less healthy snacks.    4 - Eat more high   fiber foods - vegetables and whole grains including oat bran, oats, beans, peas, and flax seed.    5 - Eat more fish - such as salmon, tuna, mackerel, and sardines.  1 or 2 six ounce servings per week is a healthy replacement for other proteins.    6 - E  xercise for at least 120 minutes per week - which is equal to 30 minutes 4 days per week.    Noreen Ventura D.O.    "

## 2019-04-22 NOTE — PROGRESS NOTES
SUBJECTIVE:   CC: Amanda Sifuentes is an 54 year old woman who presents for preventive health visit.     Healthy Habits:     Getting at least 3 servings of Calcium per day:  Yes    Bi-annual eye exam:  Yes    Dental care twice a year:  Yes    Sleep apnea or symptoms of sleep apnea:  None    Diet:  Regular (no restrictions)    Frequency of exercise:  2-3 days/week    Duration of exercise:  30-45 minutes    Medication side effects:  Not applicable    PHQ-2 Total Score: 0    Additional concerns today:  No    Complete hysterectomy at age 26,  Using premarin with out side effects  No lesion, no discharge    Dermatology -forgot to have her refill her creams-[facility]  Mammogram to be done    The 10-year ASCVD risk score (Honey SILVA Jr., et al., 2013) is: 1.8%    Values used to calculate the score:      Age: 54 years      Sex: Female      Is Non- : No      Diabetic: No      Tobacco smoker: No      Systolic Blood Pressure: 112 mmHg      Is BP treated: No      HDL Cholesterol: 74 mg/dL      Total Cholesterol: 275 mg/dL              Today's PHQ-2 Score:   PHQ-2 ( 1999 Pfizer) 4/22/2019   Q1: Little interest or pleasure in doing things 0   Q2: Feeling down, depressed or hopeless 0   PHQ-2 Score 0   Q1: Little interest or pleasure in doing things Not at all   Q2: Feeling down, depressed or hopeless Not at all   PHQ-2 Score 0       Abuse: Current or Past(Physical, Sexual or Emotional)- No  Do you feel safe in your environment? Yes    Social History     Tobacco Use     Smoking status: Never Smoker     Smokeless tobacco: Never Used   Substance Use Topics     Alcohol use: Yes     Comment: 3 drinks per wek      If you drink alcohol do you typically have >3 drinks per day or >7 drinks per week? Yes      Alcohol Use 4/22/2019   Prescreen: >3 drinks/day or >7 drinks/week? No   Prescreen: >3 drinks/day or >7 drinks/week? -   No flowsheet data found.    Reviewed orders with patient.  Reviewed health  maintenance and updated orders accordingly - Yes  BP Readings from Last 3 Encounters:   19 112/70   19 120/64   19 110/73    Wt Readings from Last 3 Encounters:   19 63.5 kg (140 lb)   19 64 kg (141 lb)   19 61.7 kg (136 lb)                    Mammogram Screening: Patient under age 50, mutual decision reflected in health maintenance.      Pertinent mammograms are reviewed under the imaging tab.  History of abnormal Pap smear: NO - age 30-65 PAP every 5 years with negative HPV co-testing recommended  PAP / HPV Latest Ref Rng & Units 2018   PAP - NIL NIL -   HPV 16 DNA NEG:Negative - - Negative   HPV 18 DNA NEG:Negative - - Negative   OTHER HR HPV NEG:Negative - - Negative     Reviewed and updated as needed this visit by clinical staff  Tobacco  Allergies  Meds         Reviewed and updated as needed this visit by Provider        Past Medical History:   Diagnosis Date     Esophageal reflux      Family history of colon cancer      Globus sensation      Multiple thyroid nodules     ,  FNAB benign     Other forms of migraine, without mention of intractable migraine without mention of status migrainosus      Unspecified hemorrhoids without mention of complication       Past Surgical History:   Procedure Laterality Date     COLONOSCOPY  2013    Procedure: COLONOSCOPY;;  Surgeon: Jim Flores MD;  Location:  GI     COLONOSCOPY N/A 2019    Procedure: COLONOSCOPY;  Surgeon: Luisito Barnard MD;  Location:  OR      EXCISION BREAST LESION, OPEN >=1      benign     HEMORRHOID SURGERY       HYSTERECTOMY, PAP STILL INDICATED      benign/with both ovaries(cervix in)     OB History    Para Term  AB Living   0 0 0 0 0 0   SAB TAB Ectopic Multiple Live Births   0 0 0 0 0       Review of Systems   Constitutional: Negative for chills and fever.   HENT: Negative for congestion, ear pain, hearing loss and sore throat.    Eyes:  "Negative for pain and visual disturbance.   Respiratory: Negative for cough and shortness of breath.    Cardiovascular: Negative for chest pain, palpitations and peripheral edema.   Gastrointestinal: Negative for abdominal pain, constipation, diarrhea, heartburn, hematochezia and nausea.   Breasts:  Negative for tenderness, breast mass and discharge.   Genitourinary: Negative for dysuria, frequency, genital sores, hematuria, pelvic pain, urgency, vaginal bleeding and vaginal discharge.   Musculoskeletal: Negative for arthralgias, joint swelling and myalgias.   Skin: Negative for rash.   Neurological: Negative for dizziness, weakness, headaches and paresthesias.   Psychiatric/Behavioral: Negative for mood changes. The patient is not nervous/anxious.      CONSTITUTIONAL: NEGATIVE for fever, chills, change in weight  INTEGUMENTARU/SKIN: NEGATIVE for worrisome rashes, moles or lesions  EYES: NEGATIVE for vision changes or irritation  ENT: NEGATIVE for ear, mouth and throat problems  RESP: NEGATIVE for significant cough or SOB  BREAST: NEGATIVE for masses, tenderness or discharge  CV: NEGATIVE for chest pain, palpitations or peripheral edema  GI: NEGATIVE for nausea, abdominal pain, heartburn, or change in bowel habits  : NEGATIVE for unusual urinary or vaginal symptoms. Periods are regular.  MUSCULOSKELETAL: NEGATIVE for significant arthralgias or myalgia  NEURO: NEGATIVE for weakness, dizziness or paresthesias  PSYCHIATRIC: NEGATIVE for changes in mood or affect     OBJECTIVE:   /70   Ht 1.651 m (5' 5\")   Wt 63.5 kg (140 lb)   BMI 23.30 kg/m    Physical Exam  GENERAL: healthy, alert and no distress  EYES: Eyes grossly normal to inspection, PERRL and conjunctivae and sclerae normal  HENT: ear canals and TM's normal, nose and mouth without ulcers or lesions  NECK: no adenopathy, no asymmetry, masses, or scars and thyroid normal to palpation  RESP: lungs clear to auscultation - no rales, rhonchi or " wheezes  BREAST: normal without masses, tenderness or nipple discharge and no palpable axillary masses or adenopathy  CV: regular rate and rhythm, normal S1 S2, no S3 or S4, no murmur, click or rub, no peripheral edema and peripheral pulses strong  ABDOMEN: soft, nontender, no hepatosplenomegaly, no masses and bowel sounds normal   (female): normal female external genitalia, normal urethral meatus, vaginal mucosa pink, moist, well rugated, and normal double cervix  MS: no gross musculoskeletal defects noted, no edema  SKIN: no suspicious lesions or rashes  NEURO: Normal strength and tone, mentation intact and speech normal  PSYCH: mentation appears normal, affect normal/bright    Diagnostic Test Results:  Results for orders placed or performed in visit on 04/19/19 (from the past 24 hour(s))   TB INTRADERMAL TEST   Result Value Ref Range    PPD Induration 0 0 - 5 mm    PPD Redness 0 mm       ASSESSMENT/PLAN:       ICD-10-CM    1. Encounter for routine adult medical exam with abnormal findings Z00.01    2. Gastroesophageal reflux disease without esophagitis K21.9 omeprazole (PRILOSEC) 40 MG DR capsule     cimetidine (TAGAMET) 400 MG tablet   3. Menopausal syndrome (hot flashes) N95.1 estrogen conj (PREMARIN) 0.625 MG tablet   4. Other migraine, not intractable, without status migrainosus G43.809 SUMAtriptan (IMITREX) 25 MG tablet   5. Acne vulgaris L70.0 desonide (DESOWEN) 0.05 % external ointment   6. Hemorrhoids, unspecified hemorrhoid type K64.9 nitroGLYcerin (NITRO-BID) 2 % OINT ointment   7. History of hysterectomy for benign disease Z90.710    8. 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    Weight as of this encounter: 63.5 kg (140 lb).           reports that she has never smoked. She has never used smokeless tobacco.      Counseling Resources:  ATP IV Guidelines  Pooled Cohorts Equation Calculator  Breast Cancer Risk Calculator  FRAX Risk Assessment  ICSI Preventive Guidelines  Dietary Guidelines for Americans, 2010  USDA's MyPlate  ASA Prophylaxis  Lung CA Screening    Noreen Ventura DO  [facility]  "

## 2019-04-24 ENCOUNTER — MYC MEDICAL ADVICE (OUTPATIENT)
Dept: FAMILY MEDICINE | Facility: CLINIC | Age: 54
End: 2019-04-24

## 2019-04-24 NOTE — TELEPHONE ENCOUNTER
Spoke with pharmacy to clarify amount dispensed. Pharmacy reported that it must have been an error on their part and they are able to fill the ordered 9 pills/month. Patient notified via [employer].     Chely Mckeon RN

## 2019-04-24 NOTE — RESULT ENCOUNTER NOTE
Mammo results managed by the breast center. Results communicated to the patient by their office.   Noreen Ventura D.O.

## 2019-05-02 ENCOUNTER — TELEPHONE (OUTPATIENT)
Dept: FAMILY MEDICINE | Facility: CLINIC | Age: 54
End: 2019-05-02

## 2019-05-02 DIAGNOSIS — K21.9 GASTROESOPHAGEAL REFLUX DISEASE WITHOUT ESOPHAGITIS: ICD-10-CM

## 2019-05-02 RX ORDER — CIMETIDINE 400 MG
400 TABLET ORAL AT BEDTIME
Qty: 90 TABLET | Refills: 3 | Status: SHIPPED | OUTPATIENT
Start: 2019-05-02 | End: 2020-05-21

## 2019-05-02 NOTE — TELEPHONE ENCOUNTER
Spoke to patient and she would like a three month supply. This is cost effective. New prescription sent to pharmacy.  Grayc Sharma RN

## 2019-05-02 NOTE — TELEPHONE ENCOUNTER
[facility]Pharmacy faxed a 90 Days Supply Request for Authorization:    cimetidine (TAGAMET) 800 MG tablet      Pharmacy is requesting a 90-day supply.    Last Written Prescription Date:  na  Last Fill Quantity: na,   # refills: na  Last Office Visit: 4/22/2019  w/ ALEXANDRIA Ventura    Future Office visit:       Routing refill request to provider for review/approval because:  Drug not active on patient's medication list

## 2019-07-21 ENCOUNTER — NURSE TRIAGE (OUTPATIENT)
Dept: NURSING | Facility: CLINIC | Age: 54
End: 2019-07-21

## 2019-07-21 NOTE — TELEPHONE ENCOUNTER
Amanda called asking about her last tetanus    Clean uncture wound in hand this morning.  It was a clean nail.  Hand was clean. Wound bled and she squeezed the wound a little more to encourage more bleeding to help clean the wound.  She's going to wash it with soap and water, use an abx ointment, then cover it.  Lat tetanus shot was 1/2/2013. No need for another shot related to this wound.      Additional Information    Negative: [1] Last tetanus shot > 5 years ago AND [2] DIRTY puncture (e.g., object OR skin was dirty, objects on ground/floor)    Negative: [1] Last tetanus shot > 10 years ago AND [2] CLEAN puncture (e.g., object AND skin were clean)    Protocols used: PUNCTURE WOUND-A-TRISTIN REID RN [facility]

## 2019-07-22 ENCOUNTER — TELEPHONE (OUTPATIENT)
Dept: FAMILY MEDICINE | Facility: CLINIC | Age: 54
End: 2019-07-22

## 2019-07-22 DIAGNOSIS — Z11.1 SCREENING EXAMINATION FOR PULMONARY TUBERCULOSIS: Primary | ICD-10-CM

## 2019-07-22 DIAGNOSIS — Z11.1 SCREENING EXAMINATION FOR PULMONARY TUBERCULOSIS: ICD-10-CM

## 2019-07-22 PROCEDURE — 86481 TB AG RESPONSE T-CELL SUSP: CPT | Performed by: FAMILY MEDICINE

## 2019-07-22 PROCEDURE — 36415 COLL VENOUS BLD VENIPUNCTURE: CPT | Performed by: FAMILY MEDICINE

## 2019-07-22 NOTE — LETTER
[facility]  [address] [id number] [phone]                                                                                                August 5, 2019    Amanda Sifuentes  [address] [place] [id number]        Dear Ms. Sifuentes,    Your recent lab results were within normal limits. A copy of those results are included with this letter.      Sincerely,      Noreen Ventura, DO/hl    Results for orders placed or performed in visit on 07/22/19   Quantiferon TB Gold Plus   Result Value Ref Range    Quantiferon-TB Gold Plus Result Negative NEG^Negative    TB1 Ag minus Nil Value 0.00 IU/mL    TB2 Ag minus Nil Value 0.00 IU/mL    Mitogen minus Nil Result >10.00 IU/mL    Nil Result 0.05 IU/mL

## 2019-07-22 NOTE — TELEPHONE ENCOUNTER
Reached out to patient to relay that orders have been placed and assisted with scheduling a lab only appointment for this evening.     Cheyl Mckeon RN

## 2019-07-22 NOTE — TELEPHONE ENCOUNTER
Reason for Call:  Other     Detailed comments: Patient previously completed a mantoux test for her work and they are now requiring a blood test for TB. Patient would like PCP to order this. Please notify when order has been placed so she can schedule a lab appointment. Patient needs results by Friday of this week.     Phone Number Patient can be reached at: Cell number on file:    Telephone Information:   Mobile [phone]       Best Time: any    Can we leave a detailed message on this number? YES    Call taken on 7/22/2019 at 11:58 AM by Kiersten Medina

## 2019-07-22 NOTE — TELEPHONE ENCOUNTER
Routing below message to PCP.  Patient requesting a quant TB gold test (pended).    Bud Granger RN

## 2019-07-24 LAB
GAMMA INTERFERON BACKGROUND BLD IA-ACNC: 0.05 IU/ML
M TB IFN-G BLD-IMP: NEGATIVE
M TB IFN-G CD4+ BCKGRND COR BLD-ACNC: >10 IU/ML
MITOGEN IGNF BCKGRD COR BLD-ACNC: 0 IU/ML
MITOGEN IGNF BCKGRD COR BLD-ACNC: 0 IU/ML

## 2019-08-30 NOTE — TELEPHONE ENCOUNTER
FUTURE VISIT INFORMATION      FUTURE VISIT INFORMATION:    Date: 9/26/19    Time: 8:30 am ENT  7:30 am Audiology    Location: [facility]  REFERRAL INFORMATION:    Referring provider:  Self    Referring providers clinic:  Self    Reason for visit/diagnosis  Ringing in ear, right side    RECORDS REQUESTED FROM:       Clinic name Comments Records Status Imaging Status         [facility] Office Visit-2/6/14-Dr. Michael Barnett [facility] Audiology Audiogram-2/6/14 [facility] US Head-5/25/17 Epic PACS

## 2019-09-26 ENCOUNTER — PRE VISIT (OUTPATIENT)
Dept: OTOLARYNGOLOGY | Facility: CLINIC | Age: 54
End: 2019-09-26

## 2019-09-26 ENCOUNTER — OFFICE VISIT (OUTPATIENT)
Dept: OTOLARYNGOLOGY | Facility: CLINIC | Age: 54
End: 2019-09-26
Payer: COMMERCIAL

## 2019-09-26 ENCOUNTER — OFFICE VISIT (OUTPATIENT)
Dept: AUDIOLOGY | Facility: CLINIC | Age: 54
End: 2019-09-26
Payer: COMMERCIAL

## 2019-09-26 VITALS
HEIGHT: 65 IN | WEIGHT: 146 LBS | SYSTOLIC BLOOD PRESSURE: 147 MMHG | BODY MASS INDEX: 24.32 KG/M2 | DIASTOLIC BLOOD PRESSURE: 95 MMHG | RESPIRATION RATE: 17 BRPM | HEART RATE: 67 BPM

## 2019-09-26 DIAGNOSIS — H93.11 TINNITUS OF RIGHT EAR: Primary | ICD-10-CM

## 2019-09-26 DIAGNOSIS — H93.11 TINNITUS, RIGHT: Primary | ICD-10-CM

## 2019-09-26 ASSESSMENT — MIFFLIN-ST. JEOR: SCORE: 1246.25

## 2019-09-26 ASSESSMENT — PAIN SCALES - GENERAL: PAINLEVEL: NO PAIN (0)

## 2019-09-26 NOTE — PROGRESS NOTES
AUDIOLOGY REPORT    SUMMARY: Audiology visit completed. See audiogram for results.      RECOMMENDATIONS: Follow-up with ENT.      Alejandra Gipson.  Licensed Audiologist  [place] #[id number]

## 2019-09-26 NOTE — PATIENT INSTRUCTIONS
Amanda Sifuentes,    It was a pleasure to see you today.    1. You were seen in the ENT Clinic today by Maritza Rivera PA-C    If you have any questions or concerns after your appointment, please call   - Option 1: ENT Clinic: [phone]  - Option 2: Maritza's number:  [phone]    2. Return as needed    3.  Consider repeat hearing test in 2-3 years.      Thank you,  Maritza Rivera PA-C  Otolaryngology  Head & Neck Surgery  [phone]

## 2019-09-26 NOTE — LETTER
9/26/2019       RE: Amanda Sifuentes  [address] [id number]     Dear Colleague,    Thank you for referring your patient, Amanda Sifuentes, to the [facility] EAR NOSE AND THROAT at [facility]. Please see a copy of my visit note below.    [facility]ose [facility] New Patient Visit Note        Otolaryngology        September 26, 2019    Referring Provider:  Self         HPI:  Amanda Sifuentes is a 54 year old female who presents for evaluation of right sided tinnitus.      Taty reports that she has had some intermittent tinnitus for about 6 months.  In the last 1 to 2 months, it has been more constant.  It is a very quiet but high-pitched noise.  She does not notice it unless it is quiet.  She specifically notices at night.  She has not had any associated dizziness or lightheadedness, hearing loss, pain, ear drainage.  There is been no trouble with odynophagia, dysphagia, lymphadenopathy or lumps in the neck.  She is had no family history of hearing loss.  She did get frequent infections as a child but did not have PE tubes.  She is not exposed to loud noise.  No TMJ issues.  She is a non-smoker.    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 and thought she wanted to just have this checked out, even though it is not that bothersome, to make sure she is not missing anything more important are concerning.    ROS:  10 point review of systems completed and is negative except for those listed above    PMH:   Past Medical History:   Diagnosis Date     Esophageal reflux      Family history of colon cancer      Globus sensation      Multiple thyroid nodules     2009, 2013 FNAB benign     Other forms of migraine, without mention of intractable migraine without mention of status migrainosus      Unspecified hemorrhoids without mention of complication        PSH:   Past Surgical History:   Procedure Laterality Date     COLONOSCOPY  5/20/2013    Procedure:  "COLONOSCOPY;;  Surgeon: Jim Flores MD;  Location: [facility]     COLONOSCOPY N/A 1/11/2019    Procedure: COLONOSCOPY;  Surgeon: Luisito Barnard MD;  [facility]     HC EXCISION BREAST LESION, OPEN >=1      benign     HEMORRHOID SURGERY       HYSTERECTOMY, PAP STILL INDICATED      benign/with both ovaries(cervix in)       FamH:   Family History   Problem Relation Age of Onset     Diabetes Maternal Grandfather      Cancer - colorectal Maternal Grandfather         70s     Hypertension Father      Prostate Cancer Father      Cancer Father         Basal Cell Carcinoma     Cerebrovascular Disease Maternal Grandmother      Cancer - colorectal Maternal Grandmother         70s     Cardiovascular Mother         MI in her 70's(4 stents)     Depression Mother        SocH:   Social History     Tobacco Use     Smoking status: Never Smoker     Smokeless tobacco: Never Used   Substance Use Topics     Alcohol use: Yes     Comment: 3 drinks per wek      Drug use: No       Medications:   Current Outpatient Medications   Medication     ascorbic acid (VITAMIN C) 1000 MG TABS     benzoyl peroxide 5 % topical gel     Calcium Carb-Cholecalciferol 600-800 MG-UNIT TABS     cimetidine (TAGAMET) 400 MG tablet     desonide (DESOWEN) 0.05 % external ointment     estrogen conj (PREMARIN) 0.625 MG tablet     GLUCOSAMINE CHONDROITIN OR TABS     MULTI-VITAMIN OR TABS     nitroGLYcerin (NITRO-BID) 2 % OINT ointment     olopatadine (PATANOL) 0.1 % ophthalmic solution     Omega-3 Fatty Acids (FISH OIL) 1200 MG capsule     omeprazole (PRILOSEC) 40 MG DR capsule     SUMAtriptan (IMITREX) 25 MG tablet     vitamin  B complex with vitamin C (VITAMIN  B COMPLEX) TABS     vitamin E 400 UNIT capsule     No current facility-administered medications for this visit.        Allergies:   No Known Allergies      Physical Exam:   BP (!) 147/95   Pulse 67   Resp 17   Ht 1.64 m (5' 4.57\")   Wt 66.2 kg (146 lb)   BMI 24.62 kg/m   "     Constitutional: The patient was unaccompanied, well-groomed, and in no acute distress.    Head: Normocephalic and atraumatic.   Eyes: Pupils were equal and reactive.  Extraocular movement intact.    Ears: Pinnae and tragus non-tender.  EACs and TMs were clear under microscopic exam.   Nose: Sinuses were non-tender.  Anterior rhinoscopy revealed midline septum and absence of purulence or polyps.    Mouth: Mucosa pink and moist, tonsils non-erythematous, no exudates, uvula midline  Skin: Normal:  warm and pink without rash  Neurologic: Alert and oriented x 3.  CN's III-XII within normal limits.  Voice normal.            Audiogram September 26, 2019: Thresholds within normal limits bilaterally, no air-bone gaps present and symmetry is noted between ears. 100% word rec.  bilaterally. Normal tymps, present (normal) ipsi/contra reflexes 1k bilaterally        Assessment/Plan:   1. Tinnitus, right  Patient with very mild tinnitus with essentially normal hearing except for a small dip in her hearing in both ears at 4000 220 and 25 dB.  Not significant enough hearing loss to require hearing amplification.    Discussed management of tinnitus with masking sounds at bedtime, since that is when it is most bothersome.  She leaves the TV on them that seems to take care of it.  She is not interested in any other masking devices.  At this point, she does not qualify for hearing amplification.  I did offer her referral to the tinnitus support clinic but she declined at this time.  We did discuss that there is an ongoing research studies at the [facility] for tinnitus and she may look into participating in that.    I recommended she get her hearing repeated in 2 to 3 years and call with any questions, concerns or new symptoms.        Maritza Rivera PA-C  Otolaryngology  Head & Neck Surgery  [phone]

## 2019-09-26 NOTE — PROGRESS NOTES
[facility] New Patient Visit Note        Otolaryngology        September 26, 2019    Referring Provider:  Self         HPI:  Amanda Sifuentes is a 54 year old female who presents for evaluation of right sided tinnitus.      Taty reports that she has had some intermittent tinnitus for about 6 months.  In the last 1 to 2 months, it has been more constant.  It is a very quiet but high-pitched noise.  She does not notice it unless it is quiet.  She specifically notices at night.  She has not had any associated dizziness or lightheadedness, hearing loss, pain, ear drainage.  There is been no trouble with odynophagia, dysphagia, lymphadenopathy or lumps in the neck.  She is had no family history of hearing loss.  She did get frequent infections as a child but did not have PE tubes.  She is not exposed to loud noise.  No TMJ issues.  She is a non-smoker.    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    ROS:  10 point review of systems completed and is negative except for those listed above    PMH:   Past Medical History:   Diagnosis Date     Esophageal reflux      Family history of colon cancer      Globus sensation      Multiple thyroid nodules     2009, 2013 FNAB benign     Other forms of migraine, without mention of intractable migraine without mention of status migrainosus      Unspecified hemorrhoids without mention of complication        PSH:   Past Surgical History:   Procedure Laterality Date     COLONOSCOPY  5/20/2013    Procedure: COLONOSCOPY;;  Surgeon: Jim Flores MD;  Location:  GI     COLONOSCOPY N/A 1/11/2019    Procedure: COLONOSCOPY;  Surgeon: Luisito Barnard MD;  Location: [facility]      EXCISION BREAST LESION, OPEN >=1      benign     HEMORRHOID SURGERY       HYSTERECTOMY, PAP STILL INDICATED       "benign/with both ovaries(cervix in)       FamH:   Family History   Problem Relation Age of Onset     Diabetes Maternal Grandfather      Cancer - colorectal Maternal Grandfather         70s     Hypertension Father      Prostate Cancer Father      Cancer Father         Basal Cell Carcinoma     Cerebrovascular Disease Maternal Grandmother      Cancer - colorectal Maternal Grandmother         70s     Cardiovascular Mother         MI in her 70's(4 stents)     Depression Mother        SocH:   Social History     Tobacco Use     Smoking status: Never Smoker     Smokeless tobacco: Never Used   Substance Use Topics     Alcohol use: Yes     Comment: 3 drinks per wek      Drug use: No       Medications:   Current Outpatient Medications   Medication     ascorbic acid (VITAMIN C) 1000 MG TABS     benzoyl peroxide 5 % topical gel     Calcium Carb-Cholecalciferol 600-800 MG-UNIT TABS     cimetidine (TAGAMET) 400 MG tablet     desonide (DESOWEN) 0.05 % external ointment     estrogen conj (PREMARIN) 0.625 MG tablet     GLUCOSAMINE CHONDROITIN OR TABS     MULTI-VITAMIN OR TABS     nitroGLYcerin (NITRO-BID) 2 % OINT ointment     olopatadine (PATANOL) 0.1 % ophthalmic solution     Omega-3 Fatty Acids (FISH OIL) 1200 MG capsule     omeprazole (PRILOSEC) 40 MG DR capsule     SUMAtriptan (IMITREX) 25 MG tablet     vitamin  B complex with vitamin C (VITAMIN  B COMPLEX) TABS     vitamin E 400 UNIT capsule     No current facility-administered medications for this visit.        Allergies:   No Known Allergies      Physical Exam:   BP (!) 147/95   Pulse 67   Resp 17   Ht 1.64 m (5' 4.57\")   Wt 66.2 kg (146 lb)   BMI 24.62 kg/m      Constitutional: The patient was unaccompanied, well-groomed, and in no acute distress.    Head: Normocephalic and atraumatic.   Eyes: Pupils were equal and reactive.  Extraocular movement intact.    Ears: Pinnae and tragus non-tender.  EACs and TMs were clear under microscopic exam.   Nose: Sinuses were " non-tender.  Anterior rhinoscopy revealed midline septum and absence of purulence or polyps.    Mouth: Mucosa pink and moist, tonsils non-erythematous, no exudates, uvula midline  Skin: Normal:  warm and pink without rash  Neurologic: Alert and oriented x 3.  CN's III-XII within normal limits.  Voice normal.            Audiogram September 26, 2019: Thresholds within normal limits bilaterally, no air-bone gaps present and symmetry is noted between ears. 100% word rec.  bilaterally. Normal tymps, present (normal) ipsi/contra reflexes 1k bilaterally        Assessment/Plan:   1. Tinnitus, right  Patient with very mild tinnitus with essentially normal hearing except for a small dip in her hearing in both ears at 4000 220 and 25 dB.  Not significant enough hearing loss to require hearing amplification.    Discussed management of tinnitus with masking sounds at bedtime, since that is when it is most bothersome.  She leaves the TV on them that seems to take care of it.  She is not interested in any other masking devices.  At this point, she does not qualify for hearing amplification.  I did offer her referral to the tinnitus support clinic but she declined at this time.  We did discuss that there is an ongoing research studies at the [facility] for tinnitus and she may look into participating in that.    I recommended she get her hearing repeated in 2 to 3 years and call with any questions, concerns or new symptoms.        Maritza Rivera PA-C  Otolaryngology  Head & Neck Surgery  [phone]

## 2019-11-06 ENCOUNTER — HEALTH MAINTENANCE LETTER (OUTPATIENT)
Age: 54
End: 2019-11-06

## 2020-05-20 ENCOUNTER — MYC MEDICAL ADVICE (OUTPATIENT)
Dept: FAMILY MEDICINE | Facility: CLINIC | Age: 55
End: 2020-05-20

## 2020-05-20 DIAGNOSIS — K64.9 HEMORRHOIDS, UNSPECIFIED HEMORRHOID TYPE: ICD-10-CM

## 2020-05-20 DIAGNOSIS — K21.9 GASTROESOPHAGEAL REFLUX DISEASE WITHOUT ESOPHAGITIS: ICD-10-CM

## 2020-05-20 DIAGNOSIS — N95.1 MENOPAUSAL SYNDROME (HOT FLASHES): ICD-10-CM

## 2020-05-20 NOTE — TELEPHONE ENCOUNTER
Routing refill request to provider for review/approval because:  Labs out of range:  Blood pressure      Dulce Desouza RN

## 2020-05-20 NOTE — TELEPHONE ENCOUNTER
"Requested Prescriptions   Pending Prescriptions Disp Refills     cimetidine (TAGAMET) 400 MG tablet 90 tablet 3     Sig: Take 1 tablet (400 mg) by mouth At Bedtime       H2 Blockers Protocol Failed - 5/20/2020 11:12 AM        Failed - Recent (12 mo) or future (30 days) visit within the authorizing provider's specialty     Patient has had an office visit with the authorizing provider or a provider within the authorizing providers department within the previous 12 mos or has a future within next 30 days. See \"Patient Info\" tab in inbasket, or \"Choose Columns\" in Meds & Orders section of the refill encounter.              Passed - Patient is age 12 or older        Passed - Medication is active on med list           estrogen conj (PREMARIN) 0.625 MG tablet 90 tablet 3     Sig: Take 1 tablet (0.625 mg) by mouth daily       Hormone Replacement Therapy Failed - 5/20/2020 11:12 AM        Failed - Blood pressure under 140/90 in past 12 months     BP Readings from Last 3 Encounters:   09/26/19 (!) 147/95   04/22/19 112/70   03/26/19 120/64                 Failed - Recent (12 mo) or future (30 days) visit within the authorizing provider's specialty     Patient has had an office visit with the authorizing provider or a provider within the authorizing providers department within the previous 12 mos or has a future within next 30 days. See \"Patient Info\" tab in inbasket, or \"Choose Columns\" in Meds & Orders section of the refill encounter.              Passed - Patient has mammogram in past 2 years on file if age 50-75        Passed - Medication is active on med list        Passed - Patient is 18 years of age or older        Passed - No active pregnancy on record        Passed - No positive pregnancy test on record in past 12 months           nitroGLYcerin (NITRO-BID) 2 % OINT ointment 15 g 3     Sig: 3-4 times a day. Use with 9 parts petroleum and apply to hemmrhoids.       Nitrates Failed - 5/20/2020 11:12 AM        Failed - " "Blood pressure under 140/90 in past 12 months     BP Readings from Last 3 Encounters:   09/26/19 (!) 147/95   04/22/19 112/70   03/26/19 120/64                 Failed - Recent (12 mo) or future (30 days) visit within the authorizing provider's specialty     Patient has had an office visit with the authorizing provider or a provider within the authorizing providers department within the previous 12 mos or has a future within next 30 days. See \"Patient Info\" tab in inbasket, or \"Choose Columns\" in Meds & Orders section of the refill encounter.              Passed - Pt is not on erectile dysfunction medications        Passed - Medication is active on med list        Passed - Patient is age 18 or older           omeprazole (PRILOSEC) 40 MG DR capsule 90 capsule 3     Sig: Take 1 capsule (40 mg) by mouth daily       PPI Protocol Failed - 5/20/2020 11:12 AM        Failed - Recent (12 mo) or future (30 days) visit within the authorizing provider's specialty     Patient has had an office visit with the authorizing provider or a provider within the authorizing providers department within the previous 12 mos or has a future within next 30 days. See \"Patient Info\" tab in inbasket, or \"Choose Columns\" in Meds & Orders section of the refill encounter.              Passed - Not on Clopidogrel (unless Pantoprazole ordered)        Passed - No diagnosis of osteoporosis on record        Passed - Medication is active on med list        Passed - Patient is age 18 or older        Passed - No active pregnacy on record        Passed - No positive pregnancy test in past 12 months             "

## 2020-05-20 NOTE — TELEPHONE ENCOUNTER
Please call patient has not been seen in over a year  She should not need all these meds    Needs to follow up with an appoint, will refill until then after reviewing with RN her need of meds  Noreen Ventura D.O.

## 2020-05-21 ENCOUNTER — TELEPHONE (OUTPATIENT)
Dept: FAMILY MEDICINE | Facility: CLINIC | Age: 55
End: 2020-05-21

## 2020-05-21 ENCOUNTER — VIRTUAL VISIT (OUTPATIENT)
Dept: FAMILY MEDICINE | Facility: CLINIC | Age: 55
End: 2020-05-21
Payer: COMMERCIAL

## 2020-05-21 DIAGNOSIS — G43.809 OTHER MIGRAINE, NOT INTRACTABLE, WITHOUT STATUS MIGRAINOSUS: ICD-10-CM

## 2020-05-21 DIAGNOSIS — L70.0 ACNE VULGARIS: ICD-10-CM

## 2020-05-21 DIAGNOSIS — K21.9 GASTROESOPHAGEAL REFLUX DISEASE WITHOUT ESOPHAGITIS: ICD-10-CM

## 2020-05-21 DIAGNOSIS — N95.1 MENOPAUSAL SYNDROME (HOT FLASHES): ICD-10-CM

## 2020-05-21 DIAGNOSIS — K64.9 HEMORRHOIDS, UNSPECIFIED HEMORRHOID TYPE: ICD-10-CM

## 2020-05-21 PROCEDURE — 99207 ZZC NO BILLABLE SERVICE THIS VISIT: CPT | Mod: TEL | Performed by: PHYSICIAN ASSISTANT

## 2020-05-21 RX ORDER — CIMETIDINE 400 MG
400 TABLET ORAL AT BEDTIME
Qty: 90 TABLET | Refills: 3 | Status: CANCELLED | OUTPATIENT
Start: 2020-05-21

## 2020-05-21 RX ORDER — CIMETIDINE 400 MG
400 TABLET ORAL AT BEDTIME
Qty: 90 TABLET | Refills: 0 | Status: SHIPPED | OUTPATIENT
Start: 2020-05-21 | End: 2020-07-20

## 2020-05-21 RX ORDER — OMEPRAZOLE 40 MG/1
40 CAPSULE, DELAYED RELEASE ORAL DAILY
Qty: 90 CAPSULE | Refills: 0 | Status: SHIPPED | OUTPATIENT
Start: 2020-05-21 | End: 2020-07-20

## 2020-05-21 RX ORDER — NITROGLYCERIN 20 MG/G
OINTMENT TOPICAL
Qty: 15 G | Refills: 1 | Status: SHIPPED | OUTPATIENT
Start: 2020-05-21 | End: 2021-07-19

## 2020-05-21 RX ORDER — OMEPRAZOLE 40 MG/1
40 CAPSULE, DELAYED RELEASE ORAL DAILY
Qty: 90 CAPSULE | Refills: 3 | Status: CANCELLED | OUTPATIENT
Start: 2020-05-21

## 2020-05-21 RX ORDER — SUMATRIPTAN 25 MG/1
TABLET, FILM COATED ORAL
Qty: 9 TABLET | Refills: 0 | Status: SHIPPED | OUTPATIENT
Start: 2020-05-21 | End: 2020-07-05

## 2020-05-21 RX ORDER — DESONIDE 0.5 MG/G
OINTMENT TOPICAL
Qty: 30 G | Refills: 1 | Status: SHIPPED | OUTPATIENT
Start: 2020-05-21 | End: 2020-07-20

## 2020-05-21 RX ORDER — NITROGLYCERIN 20 MG/G
OINTMENT TOPICAL
Qty: 15 G | Refills: 3 | Status: CANCELLED | OUTPATIENT
Start: 2020-05-21

## 2020-05-21 RX ORDER — BENZOYL PEROXIDE 5 G/100G
GEL TOPICAL DAILY
Qty: 60 G | Refills: 1 | Status: SHIPPED | OUTPATIENT
Start: 2020-05-21 | End: 2024-07-31

## 2020-05-21 NOTE — TELEPHONE ENCOUNTER
PCP notes that she will refill until the patient has an appointment. She should really do follow up on chronic meds with her PCP. It doesn't look like PCP wanted her seen immediately.    I don't think the phone visit is necessary.    Can route to PCP to fill (she's in office this week) and then have PCP determine if follow up is needed urgently.  Oitlio Vega, [employer], PA-C

## 2020-05-21 NOTE — TELEPHONE ENCOUNTER
Reason for Call:  Other prescription    Detailed comments: Pharmacy said that they received a script for nitrobid and patient would like this in a compound form and they need to discuss directions.    Phone Number   [facility] pharmacy [phone]         Best Time:     Can we leave a detailed message on this number? Not Applicable    Call taken on 5/21/2020 at 11:45 AM by Michelle Milian

## 2020-05-21 NOTE — TELEPHONE ENCOUNTER
Routing to Provider as FYI- appointment at 10am    Patient's initial physical was cancelled due to COVID therefore medication refills were not addressed.    She does have an in person physical in July with PCP but is needing a med f/u for medication renewals.    Phone visit made for today, medication pended.      Ashley Fonseca RN

## 2020-05-21 NOTE — TELEPHONE ENCOUNTER
Pharmacist called to verify that they should be mixing 15g of Nitrobid with 9 parts of petroleum.        Ashley Fonseca RN

## 2020-05-22 ENCOUNTER — TELEPHONE (OUTPATIENT)
Dept: FAMILY MEDICINE | Facility: CLINIC | Age: 55
End: 2020-05-22

## 2020-05-22 NOTE — TELEPHONE ENCOUNTER
Central Prior Authorization Team   Phone: [phone]      PA Initiation    Medication: benzoyl peroxide 5 % topical gel   Insurance Company: [id number] - Phone [phone] Fax [phone]  Pharmacy Filling the Rx: [id number] [place] [facility] [place] - [address]  Filling Pharmacy Phone: [phone]  Filling Pharmacy Fax:    Start Date: 5/22/2020

## 2020-05-22 NOTE — TELEPHONE ENCOUNTER
Prior Authorization Retail Medication Request    Medication/Dose: benzoyl peroxide 5 % topical gel     ICD code (if different than what is on RX):  na  Previously Tried and Failed:  na  Rationale:  na    Insurance Name:  alireza  Insurance ID:  na      Pharmacy Information (if different than what is on RX)  Name:  [facility]  Phone:  alireza      Pharmacy Comments:  Patient would like to process a Prior Authorization on benzoyl peroxide 5 % topical gel  because drug is not in formulary drug class and is excluded from benefit.  PA #: [phone]

## 2020-05-26 NOTE — TELEPHONE ENCOUNTER
PRIOR AUTHORIZATION DENIED    Medication: benzoyl peroxide 5 % topical gel     Denial Date: 5/23/2020    Denial Rational:      Appeal Information:    If you would like to appeal, please supply P/A team with a letter of medical necessity with clinical reason.

## 2020-05-26 NOTE — TELEPHONE ENCOUNTER
Patient notified of PA denial. Advised patient to talk to her Pharmacist for OTC options.  Delma Jeter CMA ([facility])

## 2020-07-01 DIAGNOSIS — G43.809 OTHER MIGRAINE, NOT INTRACTABLE, WITHOUT STATUS MIGRAINOSUS: ICD-10-CM

## 2020-07-03 ENCOUNTER — DOCUMENTATION ONLY (OUTPATIENT)
Dept: LAB | Facility: CLINIC | Age: 55
End: 2020-07-03

## 2020-07-03 DIAGNOSIS — Z92.3 HISTORY OF IRRADIATION, PRESENTING HAZARDS TO HEALTH: ICD-10-CM

## 2020-07-03 DIAGNOSIS — R93.89 THYROID WITH HETEROGENEOUS ECHOTEXTURE DETERMINED BY ULTRASOUND: ICD-10-CM

## 2020-07-03 DIAGNOSIS — E04.1 THYROID CYST: Primary | ICD-10-CM

## 2020-07-03 NOTE — TELEPHONE ENCOUNTER
"Requested Prescriptions   Pending Prescriptions Disp Refills     SUMAtriptan (IMITREX) 25 MG tablet 9 tablet 0     Sig: TAKE 1 TABLET BY MOUTH AT ONSET OF MIGRAINE. MAY REPEAT IN 2 HOURS IF NEEDED. MAX OF 2 PER DAY.       Serotonin Agonists Failed - 7/1/2020  8:41 AM        Failed - Blood pressure under 140/90 in past 12 months     BP Readings from Last 3 Encounters:   09/26/19 (!) 147/95   04/22/19 112/70   03/26/19 120/64                 Failed - Serotonin Agonist request needs review.     Please review patient's record. If patient has had 8 or more treatments in the past month, please forward to provider.          Passed - Recent (12 mo) or future (30 days) visit within the authorizing provider's specialty     Patient has had an office visit with the authorizing provider or a provider within the authorizing providers department within the previous 12 mos or has a future within next 30 days. See \"Patient Info\" tab in inbasket, or \"Choose Columns\" in Meds & Orders section of the refill encounter.              Passed - Medication is active on med list        Passed - Patient is age 18 or older        Passed - No active pregnancy on record        Passed - No positive pregnancy test in past 12 months           Routing refill request to provider for review/approval because:  Elevated BP.    Florencia Roche RN  [facility]                "

## 2020-07-03 NOTE — PROGRESS NOTES
There is no blood test I would routinely order on her.  If anything comes up at the appt she will require a repeat draw.  We should talk first and decide what she needs after that.  I did order thyroid US if she wants to try to get that done prior to the appt.     Kiersten Tenorio MD

## 2020-07-05 RX ORDER — SUMATRIPTAN 25 MG/1
TABLET, FILM COATED ORAL
Qty: 9 TABLET | Refills: 0 | Status: SHIPPED | OUTPATIENT
Start: 2020-07-05 | End: 2020-07-20

## 2020-07-06 ENCOUNTER — DOCUMENTATION ONLY (OUTPATIENT)
Dept: FAMILY MEDICINE | Facility: CLINIC | Age: 55
End: 2020-07-06

## 2020-07-06 ENCOUNTER — MYC MEDICAL ADVICE (OUTPATIENT)
Dept: FAMILY MEDICINE | Facility: CLINIC | Age: 55
End: 2020-07-06

## 2020-07-06 DIAGNOSIS — Z13.89 ENCOUNTER FOR SCREENING FOR OTHER DISORDER: Primary | ICD-10-CM

## 2020-07-06 DIAGNOSIS — Z13.1 SCREENING FOR DIABETES MELLITUS: ICD-10-CM

## 2020-07-06 DIAGNOSIS — Z13.220 SCREENING CHOLESTEROL LEVEL: Primary | ICD-10-CM

## 2020-07-06 NOTE — TELEPHONE ENCOUNTER
Reviewing request while PCP Dr. Ventura is out of the office.    I ordered the Hemoglobin for this patient as requested.  I sent patient [employer] message to let her know.    Melissa Coon, DNP, APRN, CNP

## 2020-07-06 NOTE — PROGRESS NOTES
Called and spoke with patient.   She is unsure what other labs she needs.   Informed her that Glucose and Lipids are ordered  She will look for the form she needs completed and Mychart us back before her LAB appointment on 7/15/2020 if additional labs are needed.     Lana Wilder MA

## 2020-07-06 NOTE — PROGRESS NOTES
I received a message regarding this patient needing pre-visit labs and that they need orders.    I've never met her, I have no idea what she needs. Please call and confirm that the labs I entered (Lipid and glucose) are what she is expecting.    Thanks.  ROSALIE Navarrete, PA-C

## 2020-07-06 NOTE — TELEPHONE ENCOUNTER
Routing to covering Providers.    Patient requesting add on to future labs, hemoglobin.  This is requested for her work.  Pended    Thank you       Ashley Fonseca RN

## 2020-07-09 ENCOUNTER — ANCILLARY PROCEDURE (OUTPATIENT)
Dept: ULTRASOUND IMAGING | Facility: CLINIC | Age: 55
End: 2020-07-09
Payer: COMMERCIAL

## 2020-07-09 DIAGNOSIS — R93.89 THYROID WITH HETEROGENEOUS ECHOTEXTURE DETERMINED BY ULTRASOUND: ICD-10-CM

## 2020-07-09 DIAGNOSIS — Z92.3 HISTORY OF IRRADIATION, PRESENTING HAZARDS TO HEALTH: ICD-10-CM

## 2020-07-09 DIAGNOSIS — E04.1 THYROID CYST: ICD-10-CM

## 2020-07-15 DIAGNOSIS — Z13.89 ENCOUNTER FOR SCREENING FOR OTHER DISORDER: ICD-10-CM

## 2020-07-15 DIAGNOSIS — Z13.1 SCREENING FOR DIABETES MELLITUS: ICD-10-CM

## 2020-07-15 DIAGNOSIS — Z13.220 SCREENING CHOLESTEROL LEVEL: ICD-10-CM

## 2020-07-15 LAB
CHOLEST SERPL-MCNC: 244 MG/DL
GLUCOSE SERPL-MCNC: 90 MG/DL (ref 70–99)
HDLC SERPL-MCNC: 72 MG/DL
HGB BLD-MCNC: 14 G/DL (ref 11.7–15.7)
LDLC SERPL CALC-MCNC: 151 MG/DL
NONHDLC SERPL-MCNC: 172 MG/DL
TRIGL SERPL-MCNC: 103 MG/DL

## 2020-07-15 PROCEDURE — 36415 COLL VENOUS BLD VENIPUNCTURE: CPT | Performed by: NURSE PRACTITIONER

## 2020-07-15 PROCEDURE — 80061 LIPID PANEL: CPT | Performed by: NURSE PRACTITIONER

## 2020-07-15 PROCEDURE — 82947 ASSAY GLUCOSE BLOOD QUANT: CPT | Performed by: NURSE PRACTITIONER

## 2020-07-15 PROCEDURE — 85018 HEMOGLOBIN: CPT | Performed by: NURSE PRACTITIONER

## 2020-07-17 NOTE — PROGRESS NOTES
"Amanda Sifuentes is a 55 year old female who is being evaluated via a billable telephone visit.      The patient has been notified of following:     \"This telephone visit will be conducted via a call between you and your physician/provider. We have found that certain health care needs can be provided without the need for a physical exam.  This service lets us provide the care you need with a short phone conversation.  If a prescription is necessary we can send it directly to your pharmacy.  If lab work is needed we can place an order for that and you can then stop by our lab to have the test done at a later time.    Telephone visits are billed at different rates depending on your insurance coverage. During this emergency period, for some insurers they may be billed the same as an in-person visit.  Please reach out to your insurance provider with any questions.    If during the course of the call the physician/provider feels a telephone visit is not appropriate, you will not be charged for this service.\"    Patient has given verbal consent for Telephone visit?  Yes    What phone number would you like to be contacted at? [phone]    How would you like to obtain your AVS? Itzel Thorne MA        "

## 2020-07-19 NOTE — PROGRESS NOTES
Endocrinology video visit Progress note    Assessment / Plan  1. Thyroid nodules.  Hheterogeneous thyroid  in patient with adult age radiation exposures.  The nodules are small and none has characteristics of great concern.  The US from 7/2020 is very similar to the past studies except for nodule #2 on the right which was previously a large anechoic cyst and which now looks like a smaller hypoechoic nodule.     2  History of possible irradiation exposure causing health risks. This would have been in her 20's. This may be a small risk factor for future head and neck tumors including of the thyroid or parathyroid -as per # 1 and # 2.  Both will continue to need to be watched periodically  Next US could be in 5 years, 2025, or sooner prn    Due to the COVID 19 pandemic this visit was a telephone/video visit in order to help prevent spread of infection in this high risk patient and the general population. The patient gave verbal consent for the visit today.    Start time doximity video  invite [id number]  Stop time 1350  Total time  4 minutes  Kiersten Tenorio MD.    CHAPIS Patel presents for follow up of nodular thyroid in the context of history of ? 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  I last saw her 7/2018. She already had  US in anticipation of this appt.      We have been following her with periodic US and FNAB of thyroid nodules.    6/12/13 she had FNAB of the dominant right (# 3) and left dominant nodules (#3), as well as another left lobe nodule.  benign on all 3 nodules ([id number])    We have the following tumor marker and antibody data:  10/21/09: TP0 < 10  11/1/10: IRIS < 20, calcitonin 3  3/13/15: Tg 13.8, RIIS < 0.4, TSH   11/22/16: Tg 11.7 (concurrent 11.3), IRIS < 0.4, PTH 51, vitamin D 63  4/19/19 Ca 9.5, creatinine 0.76    Review of the image  7/9/2020 thyroid US compared with 5/25/17 thyroid and neck US:    Right # 1 anterior  0.8 x 0.5 x 0.8 (was 0.8 x 0.4 x 0.9 cm ,  hyperechoic; 0.7 x 0.7 x  0.8;  0.8 x0.7 x 0.9 mixed)  Right # 2 posterior 1 x 0.9 x 0.9 hypoechoic (was 1.7 x 1.5 x 2.2 cm cyst  (was 0.8 x 1 x 1.5 cm -- this is changed from past studies  Right # 3 lateral 0.9 x 0.6 x 1 (was  1 x 0.7 x 0.9 cm;  0.8 x0.7 x 0.9 mixed)  Left dense shadowing calcification 0.2 x 0.1 x  0.1 (was  0.2 x 0.2 x 0.2 cm   Left # 2 0.8 x 0.5 x 0.8 cm  (was 0.5  X 0.3 x 0.7 cm  Left # 3  1 x 0.6 x 1.2 cm ( was 1.0 x 0.6 x 1.2 - posterior , hyperechoic with white margins (was 0.8 x0.7 x 1.0 cm )  Left # 4 0.5 x 0.5 x (was  0.6 x 0.4 x (was 0.5 x 0.3 x 0.7 cm)  No abnormal lymph nodes .    ROS   No concerns  No neck or swallow symptoms  Cardiac, respiratory, GI: negative    Family Hx    Family History   Problem Relation Age of Onset     Diabetes Maternal Grandfather      Cancer - colorectal Maternal Grandfather         70s     Hypertension Father      Prostate Cancer Father      Cancer Father         Basal Cell Carcinoma     Cerebrovascular Disease Maternal Grandmother      Cancer - colorectal Maternal Grandmother         70s     Cardiovascular Mother         MI in her 70's(4 stents)     Depression Mother      No known thyroid cancer    Personal Hx   Behavioral history: No tobacco use.   Home environment: No secondhand tobacco smoke in home.   .   PMH   Past Medical History:   Diagnosis Date     Esophageal reflux      Family history of colon cancer      Globus sensation      Multiple thyroid nodules     2009, 2013 FNAB benign     Other forms of migraine, without mention of intractable migraine without mention of status migrainosus      Unspecified hemorrhoids without mention of complication      Past Surgical History:   Procedure Laterality Date     COLONOSCOPY  5/20/2013    Procedure: COLONOSCOPY;;  Surgeon: Jim Flores MD;  Location:  GI     COLONOSCOPY N/A 1/11/2019    Procedure: COLONOSCOPY;  Surgeon: Luisito Barnard MD;  Location: [facility] OR     HC EXCISION BREAST LESION,  OPEN >=1      benign     HEMORRHOID SURGERY       HYSTERECTOMY, PAP STILL INDICATED      benign/with both ovaries(cervix in)       Current Outpatient Medications   Medication Sig Dispense Refill     ascorbic acid (VITAMIN C) 1000 MG TABS Take 1 tablet by mouth daily.       benzoyl peroxide 5 % topical gel Apply topically daily 60 g 1     Calcium Carb-Cholecalciferol 600-800 MG-UNIT TABS Dose unknown       cimetidine (TAGAMET) 400 MG tablet Take 1 tablet (400 mg) by mouth At Bedtime 90 tablet 0     desonide (DESOWEN) 0.05 % external ointment Apply thin layer to affected area BID. 30 g 1     estrogen conj (PREMARIN) 0.625 MG tablet Take 1 tablet (0.625 mg) by mouth daily 90 tablet 0     GLUCOSAMINE CHONDROITIN OR TABS TAKE 3 TABLETS (1500MG-GLUCOSAMINE) DAILY 300 tab 3     MULTI-VITAMIN OR TABS 1 TABLET DAILY 35 0     nitroGLYcerin (NITRO-BID) 2 % OINT ointment 3-4 times a day. Use with 9 parts petroleum and apply to hemmrhoids. 15 g 1     Omega-3 Fatty Acids (FISH OIL) 1200 MG capsule Take 1 capsule by mouth daily.       omeprazole (PRILOSEC) 40 MG DR capsule Take 1 capsule (40 mg) by mouth daily 90 capsule 0     SUMAtriptan (IMITREX) 25 MG tablet TAKE 1 TABLET BY MOUTH AT ONSET OF MIGRAINE. MAY REPEAT IN 2 HOURS IF NEEDED. MAX OF 2 PER DAY. 9 tablet 0     vitamin  B complex with vitamin C (VITAMIN  B COMPLEX) TABS Take 1 tablet by mouth daily       vitamin E 400 UNIT capsule Take 1 capsule by mouth daily.       Physical Exam   GENERAL: thin middle aged woman in NAD   There were no vitals taken for this visit.  GENERAL: alert and no distress  SKIN: Visible skin clear.   EYES: Eyes grossly normal to inspection.  NECK: goiter is not grossly visible  RESP: No audible wheeze, cough, or visible cyanosis.  No visible retractions or increased work of breathing.    NEURO: Cranial nerves grossly intact.  Mentation and speech appropriate  PSYCH: Mentation appears normal, affect normal/bright, normal speech and appearance  well-groomed..     DATA review:     US THYROID 7/9/2020 7:23 AM     COMPARISON: 5/25/2017     HISTORY: Thyroid cyst; Thyroid with heterogeneous echotexture  determined by ultrasound; History of irradiation, presenting hazards  to health     FINDINGS:   Thyroid parenchyma: Mildly heterogenous.  The right lobe of the thyroid measures: 1.8 x 1.7 x 5.2 cm  The left lobe of the thyroid measures: 1.2 x 1.4 x 4.4 cm   The thyroid isthmus measures: 0.3 cm     Right Lobe:  Nodule 1:  Location: Upper pole  Size: 8 x 5 x 8 mm  Composition: Solid or almost completely solid (2 points)  Echogenicity: Hyperechoic or isoechoic (1 point)  Shape: Wider than tall (0 points)  Margin: Ill-defined (0 points)  Echogenic Foci: none or large comet tail artifact (0 points)  Stability: No significant change in size  TIRADS: TR3 (3 points)      Nodule 2:  Location: Lower pole  Size: 10 x 9 x 10 mm  Composition: Cystic or almost completely cystic (0 points)  Echogenicity: Anechoic (0 points)  Shape: Wider than tall (0 points)  Margin: Smooth (0 points)  Echogenic Foci: none or large comet tail artifact (0 points)  Stability: Decreasing  TIRADS: TR1 (0 points) Benign     Nodule 3:  Location: Lower pole  Size: 9 x 6 x 10 mm  Composition: Solid or almost completely solid (2 points)  Echogenicity: Hyperechoic or isoechoic (1 point)  Shape: Wider than tall (0 points)  Margin: Ill-defined (0 points)  Echogenic Foci: none or large comet tail artifact (0 points)  Stability: No significant change in size  TIRADS: TR3 (3 points)      Left Lobe:     Nodule 1:  Location: Upper pole  Size: 2 x 1 x 1 mm  Composition: Solid or almost completely solid (2 points)  Echogenicity: Hyperechoic or isoechoic (1 point)  Shape: Wider than tall (0 points)  Margin: Ill-defined (0 points)  Echogenic Foci: Macro-calcifications (1 point)  Stability: No significant change in size  TIRADS: TR4 (4-6 points)         Nodule 2:  Location: Mid  Size: 7 x 5 x 8 mm  Composition:  Solid or almost completely solid (2 points)  Echogenicity: Hypoechoic (2 points)  Shape: Wider than tall (0 points)  Margin: Ill-defined (0 points)  Echogenic Foci: none or large comet tail artifact (0 points)  Stability: Enlarging, previously 5 x 3 x 7 mm  TIRADS: TR4 (4-6 points)         Nodule 3:  Location: Lower pole  Size: 10 x 6 x 12 mm   Composition: Solid or almost completely solid (2 points)  Echogenicity: Hypoechoic (2 points)  Shape: Wider than tall (0 points)  Margin: Smooth (0 points)  Echogenic Foci: none or large comet tail artifact (0 points)  Stability: No significant change in size  TIRADS: TR4 (4-6 points)      Nodule 4:  Location: Lower pole  Size: 5 x 5 x 8 mm   Composition: Solid or almost completely solid (2 points)  Echogenicity: Hypoechoic (2 points)  Shape: Wider than tall (0 points)  Margin: Smooth (0 points)  Echogenic Foci: none or large comet tail artifact (0 points)  Stability: No significant change in size  TIRADS: 4                                                                            Impression:  1. Relatively stable solid thyroid nodules. Slight enlargement of  solid nodule in the left lobe (#2).  2. None of the nodules meet criteria for biopsy by TIRADS and size  criteria.     ACR TI-RADS recommendations  TR2 (2 points) & TR1 (0 points) -No FNA or follow-up  TR3 (3 points) - FNA if ? 2.5cm, follow-up if 1.5 -2.4 cm in 1, 3 and  5 years  TR4 (4-6 points) - FNA if ? 1.5cm, follow-up if 1 -1.4 cm in 1, 2, 3  and 5 years  TR5 (?7 points) - FNA if ? 1cm, follow-up if 0.5 -0.9 cm every year  for 5 years      I have personally reviewed the examination and initial interpretation  and I agree with the findings.     JANEE JUSTICE MD

## 2020-07-20 ENCOUNTER — VIRTUAL VISIT (OUTPATIENT)
Dept: ENDOCRINOLOGY | Facility: CLINIC | Age: 55
End: 2020-07-20
Payer: COMMERCIAL

## 2020-07-20 ENCOUNTER — OFFICE VISIT (OUTPATIENT)
Dept: FAMILY MEDICINE | Facility: CLINIC | Age: 55
End: 2020-07-20
Payer: COMMERCIAL

## 2020-07-20 ENCOUNTER — ANCILLARY PROCEDURE (OUTPATIENT)
Dept: MAMMOGRAPHY | Facility: CLINIC | Age: 55
End: 2020-07-20
Attending: FAMILY MEDICINE
Payer: COMMERCIAL

## 2020-07-20 VITALS
TEMPERATURE: 98.3 F | HEART RATE: 78 BPM | SYSTOLIC BLOOD PRESSURE: 132 MMHG | OXYGEN SATURATION: 98 % | DIASTOLIC BLOOD PRESSURE: 85 MMHG

## 2020-07-20 DIAGNOSIS — N95.1 MENOPAUSAL SYNDROME (HOT FLASHES): ICD-10-CM

## 2020-07-20 DIAGNOSIS — G43.809 OTHER MIGRAINE, NOT INTRACTABLE, WITHOUT STATUS MIGRAINOSUS: ICD-10-CM

## 2020-07-20 DIAGNOSIS — K21.9 GASTROESOPHAGEAL REFLUX DISEASE WITHOUT ESOPHAGITIS: ICD-10-CM

## 2020-07-20 DIAGNOSIS — R93.89 THYROID WITH HETEROGENEOUS ECHOTEXTURE DETERMINED BY ULTRASOUND: Primary | ICD-10-CM

## 2020-07-20 DIAGNOSIS — L70.0 ACNE VULGARIS: ICD-10-CM

## 2020-07-20 DIAGNOSIS — Z00.00 ROUTINE GENERAL MEDICAL EXAMINATION AT A HEALTH CARE FACILITY: Primary | ICD-10-CM

## 2020-07-20 DIAGNOSIS — Z82.49 FAMILY HISTORY OF ASCVD: ICD-10-CM

## 2020-07-20 DIAGNOSIS — E04.1 THYROID NODULE: ICD-10-CM

## 2020-07-20 DIAGNOSIS — Z12.31 VISIT FOR SCREENING MAMMOGRAM: ICD-10-CM

## 2020-07-20 PROCEDURE — 77063 BREAST TOMOSYNTHESIS BI: CPT

## 2020-07-20 PROCEDURE — 77067 SCR MAMMO BI INCL CAD: CPT

## 2020-07-20 PROCEDURE — 99396 PREV VISIT EST AGE 40-64: CPT | Performed by: PHYSICIAN ASSISTANT

## 2020-07-20 RX ORDER — SUMATRIPTAN 25 MG/1
TABLET, FILM COATED ORAL
Qty: 9 TABLET | Refills: 11 | Status: SHIPPED | OUTPATIENT
Start: 2020-07-20 | End: 2021-07-19

## 2020-07-20 RX ORDER — CIMETIDINE 400 MG
400 TABLET ORAL AT BEDTIME
Qty: 90 TABLET | Refills: 3 | Status: SHIPPED | OUTPATIENT
Start: 2020-07-20 | End: 2021-07-19

## 2020-07-20 RX ORDER — DESONIDE 0.5 MG/G
OINTMENT TOPICAL
Qty: 30 G | Refills: 1 | Status: SHIPPED | OUTPATIENT
Start: 2020-07-20 | End: 2021-07-19

## 2020-07-20 RX ORDER — OMEPRAZOLE 40 MG/1
40 CAPSULE, DELAYED RELEASE ORAL DAILY
Qty: 90 CAPSULE | Refills: 3 | Status: SHIPPED | OUTPATIENT
Start: 2020-07-20 | End: 2021-07-19

## 2020-07-20 NOTE — PATIENT INSTRUCTIONS
To schedule CT  [facility] Scheduling Phone Number: [phone]  Or   [facility] Scheduling Phone number: [phone]     The 10-year ASCVD risk score (Honey SILVA Jr., et al., 2013) is: 2.5%    Values used to calculate the score:      Age: 55 years      Sex: Female      Is Non- : No      Diabetic: No      Tobacco smoker: No      Systolic Blood Pressure: 132 mmHg      Is BP treated: No      HDL Cholesterol: 72 mg/dL      Total Cholesterol: 244 mg/dL      Preventive Health Recommendations  Female Ages 50 - 64    Yearly exam: See your health care provider every year in order to  o Review health changes.   o Discuss preventive care.    o Review your medicines if your doctor has prescribed any.      Get a Pap test every three years (unless you have an abnormal result and your provider advises testing more often).    If you get Pap tests with HPV test, you only need to test every 5 years, unless you have an abnormal result.     You do not need a Pap test if your uterus was removed (hysterectomy) and you have not had cancer.    You should be tested each year for STDs (sexually transmitted diseases) if you're at risk.     Have a mammogram every 1 to 2 years.    Have a colonoscopy at age 50, or have a yearly FIT test (stool test). These exams screen for colon cancer.      Have a cholesterol test every 5 years, or more often if advised.    Have a diabetes test (fasting glucose) every three years. If you are at risk for diabetes, you should have this test more often.     If you are at risk for osteoporosis (brittle bone disease), think about having a bone density scan (DEXA).    Shots: Get a flu shot each year. Get a tetanus shot every 10 years.    Nutrition:     Eat at least 5 servings of fruits and vegetables each day.    Eat whole-grain bread, whole-wheat pasta and brown rice instead of white grains and rice.    Get adequate Calcium and Vitamin D.      Lifestyle    Exercise at least 150 minutes a week (30 minutes a day, 5 days a week). This will help you control your weight and prevent disease.    Limit alcohol to one drink per day.    No smoking.     Wear sunscreen to prevent skin cancer.     See your dentist every six months for an exam and cleaning.    See your eye doctor every 1 to 2 years.

## 2020-07-20 NOTE — PROGRESS NOTES
SUBJECTIVE:   CC: Amanda Sifuentes is an 55 year old woman who presents for preventive health visit.     Healthy Habits:    Do you get at least three servings of calcium containing foods daily (dairy, green leafy vegetables, etc.)? yes    Amount of exercise or daily activities, outside of work: 2.5 day(s) per week    Problems taking medications regularly No    Medication side effects: No    Have you had an eye exam in the past two years? yes    Do you see a dentist twice per year? yes    Do you have sleep apnea, excessive snoring or daytime drowsiness?no      Today's PHQ-2 Score:   PHQ-2 ( 1999 [employer]) 5/21/2020 9/26/2019   Q1: Little interest or pleasure in doing things 0 0   Q2: Feeling down, depressed or hopeless 0 0   PHQ-2 Score 0 0   Q1: Little interest or pleasure in doing things - -   Q2: Feeling down, depressed or hopeless - -   PHQ-2 Score - -       Abuse: Current or Past(Physical, Sexual or Emotional)- No  Do you feel safe in your environment? Yes    Have you ever done Advance Care Planning? (For example, a Health Directive, POLST, or a discussion with a medical provider or your loved ones about your wishes): No, advance care planning information given to patient to review.  Advanced care planning was discussed at today's visit.    Social History     Tobacco Use     Smoking status: Never Smoker     Smokeless tobacco: Never Used   Substance Use Topics     Alcohol use: Yes     Comment: 3 drinks per wek      If you drink alcohol do you typically have >3 drinks per day or >7 drinks per week? No                     Reviewed orders with patient.  Reviewed health maintenance and updated orders accordingly - Yes  Lab work is in process    Mammogram Screening: Patient over age 50, mutual decision to screen reflected in health maintenance.    Pertinent mammograms are reviewed under the imaging tab.  History of abnormal Pap smear: Status post benign hysterectomy. Health Maintenance and Surgical History  updated.  PAP / HPV Latest Ref Rng & Units 4/9/2018 4/9/2018 4/9/2018   PAP - NIL NIL -   HPV 16 DNA NEG:Negative - - Negative   HPV 18 DNA NEG:Negative - - Negative   OTHER HR HPV NEG:Negative - - Negative     Reviewed and updated as needed this visit by clinical staff         Reviewed and updated as needed this visit by Provider            ROS:  CONSTITUTIONAL: NEGATIVE for fever, chills, change in weight  INTEGUMENTARY/SKIN: NEGATIVE for worrisome rashes, moles or lesions  EYES: NEGATIVE for vision changes or irritation  ENT: NEGATIVE for ear, mouth and throat problems  RESP: NEGATIVE for significant cough or SOB  BREAST: NEGATIVE for masses, tenderness or discharge  CV: NEGATIVE for chest pain, palpitations or peripheral edema  GI: NEGATIVE for nausea, abdominal pain, heartburn, or change in bowel habits  : NEGATIVE for unusual urinary or vaginal symptoms. No vaginal bleeding.  MUSCULOSKELETAL: NEGATIVE for significant arthralgias or myalgia  NEURO: NEGATIVE for weakness, dizziness or paresthesias  PSYCHIATRIC: NEGATIVE for changes in mood or affect     OBJECTIVE:   There were no vitals taken for this visit.  EXAM:  GENERAL: healthy, alert and no distress  EYES: Eyes grossly normal to inspection, PERRL and conjunctivae and sclerae normal  HENT: ear canals and TM's normal, nose and mouth without ulcers or lesions  NECK: no adenopathy, no asymmetry, masses, or scars and thyroid normal to palpation  RESP: lungs clear to auscultation - no rales, rhonchi or wheezes  CV: regular rate and rhythm, normal S1 S2, no S3 or S4, no murmur, click or rub, no peripheral edema and peripheral pulses strong  ABDOMEN: soft, nontender, no hepatosplenomegaly, no masses and bowel sounds normal  MS: no gross musculoskeletal defects noted, no edema  SKIN: no suspicious lesions or rashes  NEURO: Normal strength and tone, mentation intact and speech normal  PSYCH: mentation appears normal, affect normal/bright  LYMPH: no cervical,  "supraclavicular, axillary, or inguinal adenopathy    Diagnostic Test Results:  Labs reviewed in Epic    ASSESSMENT/PLAN:   (Z00.00) Routine general medical examination at a health care facility  (primary encounter diagnosis)  Comment: Well person   Plan: Diet, exercise, wellness and other preventive recommendations related to health maintenance were discussed.  Follow up as needed for acute issues.  Physical exam in 1 year.     (K21.9) Gastroesophageal reflux disease without esophagitis  Comment:   Plan: omeprazole (PRILOSEC) 40 MG DR capsule,         cimetidine (TAGAMET) 400 MG tablet        Refills. Stable.     (G43.809) Other migraine, not intractable, without status migrainosus  Comment:   Plan: SUMAtriptan (IMITREX) 25 MG tablet        Refills. Stable.     (N95.1) Menopausal syndrome (hot flashes)  Comment:   Plan: estrogen conj (PREMARIN) 0.625 MG tablet        Refills. Reviewed pros / cons / risks of ongoing use. Hysterectomy (total) at age 10. She wants to continue this. Declines a dose lower.     (L70.0) Acne vulgaris  Comment:   Plan: desonide (DESOWEN) 0.05 % external ointment        Refills     (Z82.49) Family history of ASCVD  Comment:   Plan: CT Coronary Calcium Scan        Asymptomatic. Consider CT calcium scan.       COUNSELING:   Reviewed preventive health counseling, as reflected in patient instructions    Estimated body mass index is 24.62 kg/m  as calculated from the following:    Height as of 9/26/19: 1.64 m (5' 4.57\").    Weight as of 9/26/19: 66.2 kg (146 lb).       reports that she has never smoked. She has never used smokeless tobacco.      Counseling Resources:  ATP IV Guidelines  Pooled Cohorts Equation Calculator  Breast Cancer Risk Calculator  FRAX Risk Assessment  ICSI Preventive Guidelines  Dietary Guidelines for Americans, 2010  USDA's MyPlate  ASA Prophylaxis  Lung CA Screening    BRYANT HIGGINS PA-C  [facility]  "

## 2020-07-20 NOTE — LETTER
"7/20/2020       RE: Amanda Sifuentes  [address] [facility] [address]     Dear Colleague,    Thank you for referring your patient, Amanda Sifuentes, to the [facility] ENDOCRINOLOG[facility] at [facility]. Please see a copy of my visit note below.    Amanda Sifuentes is a 55 year old female who is being evaluated via a billable telephone visit.      The patient has been notified of following:     \"This telephone visit will be conducted via a call between you and your physician/provider. We have found that certain health care needs can be provided without the need for a physical exam.  This service lets us provide the care you need with a short phone conversation.  If a prescription is necessary we can send it directly to your pharmacy.  If lab work is needed we can place an order for that and you can then stop by our lab to have the test done at a later time.    Telephone visits are billed at different rates depending on your insurance coverage. During this emergency period, for some insurers they may be billed the same as an in-person visit.  Please reach out to your insurance provider with any questions.    If during the course of the call the physician/provider feels a telephone visit is not appropriate, you will not be charged for this service.\"    Patient has given verbal consent for Telephone visit?  Yes    What phone number would you like to be contacted at? [phone]    How would you like to obtain your AVS? Itzel Thorne MA          Endocrinology video visit Progress note    Assessment / Plan  1. Thyroid nodules.  Hheterogeneous thyroid  in patient with adult age radiation exposures.  The nodules are small and none has characteristics of great concern.  The US from 7/2020 is very similar to the past studies except for nodule #2 on the right which was previously a large anechoic cyst and which now looks like a smaller hypoechoic nodule.     2  History " of possible irradiation exposure causing health risks. This would have been in her 20's. This may be a small risk factor for future head and neck tumors including of the thyroid or parathyroid -as per # 1 and # 2.  Both will continue to need to be watched periodically  Next US could be in 5 years, 2025, or sooner prn    Due to the COVID 19 pandemic this visit was a telephone/video visit in order to help prevent spread of infection in this high risk patient and the general population. The patient gave verbal consent for the visit today.    Start time doximity video  invite 1350  Stop time 1358  Total time  4 minutes  Kiersten Tenorio MD.    CHAPIS Patel presents for follow up of nodular thyroid in the context of history of ? 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  I last saw her 7/2018. She already had  US in anticipation of this appt.      We have been following her with periodic US and FNAB of thyroid nodules.    6/12/13 she had FNAB of the dominant right (# 3) and left dominant nodules (#3), as well as another left lobe nodule.  benign on all 3 nodules ([id number])    We have the following tumor marker and antibody data:  10/21/09: TP0 < 10  11/1/10: IRIS < 20, calcitonin 3  3/13/15: Tg 13.8, IRIS < 0.4, TSH   11/22/16: Tg 11.7 (concurrent 11.3), IRIS < 0.4, PTH 51, vitamin D 63  4/19/19 Ca 9.5, creatinine 0.76    Review of the image  7/9/2020 thyroid US compared with 5/25/17 thyroid and neck US:    Right # 1 anterior  0.8 x 0.5 x 0.8 (was 0.8 x 0.4 x 0.9 cm , hyperechoic; 0.7 x 0.7 x  0.8;  0.8 x0.7 x 0.9 mixed)  Right # 2 posterior 1 x 0.9 x 0.9 hypoechoic (was 1.7 x 1.5 x 2.2 cm cyst  (was 0.8 x 1 x 1.5 cm -- this is changed from past studies  Right # 3 lateral 0.9 x 0.6 x 1 (was  1 x 0.7 x 0.9 cm;  0.8 x0.7 x 0.9 mixed)  Left dense shadowing calcification 0.2 x 0.1 x  0.1 (was  0.2 x 0.2 x 0.2 cm   Left # 2 0.8 x 0.5 x 0.8 cm  (was 0.5  X 0.3 x 0.7 cm  Left # 3  1 x 0.6 x 1.2 cm (  was 1.0 x 0.6 x 1.2 - posterior , hyperechoic with white margins (was 0.8 x0.7 x 1.0 cm )  Left # 4 0.5 x 0.5 x (was  0.6 x 0.4 x (was 0.5 x 0.3 x 0.7 cm)  No abnormal lymph nodes .    ROS   No concerns  No neck or swallow symptoms  Cardiac, respiratory, GI: negative    Family Hx    Family History   Problem Relation Age of Onset     Diabetes Maternal Grandfather      Cancer - colorectal Maternal Grandfather         70s     Hypertension Father      Prostate Cancer Father      Cancer Father         Basal Cell Carcinoma     Cerebrovascular Disease Maternal Grandmother      Cancer - colorectal Maternal Grandmother         70s     Cardiovascular Mother         MI in her 70's(4 stents)     Depression Mother      No known thyroid cancer    Personal Hx   Behavioral history: No tobacco use.   Home environment: No secondhand tobacco smoke in home.   .   PMH   Past Medical History:   Diagnosis Date     Esophageal reflux      Family history of colon cancer      Globus sensation      Multiple thyroid nodules     2009, 2013 FNAB benign     Other forms of migraine, without mention of intractable migraine without mention of status migrainosus      Unspecified hemorrhoids without mention of complication      Past Surgical History:   Procedure Laterality Date     COLONOSCOPY  5/20/2013    Procedure: COLONOSCOPY;;  Surgeon: Jim Flores MD;  Location: [facility]     COLONOSCOPY N/A 1/11/2019    Procedure: COLONOSCOPY;  Surgeon: Luisito Barnard MD;  Location: [facility] OR     HC EXCISION BREAST LESION, OPEN >=1      benign     HEMORRHOID SURGERY       HYSTERECTOMY, PAP STILL INDICATED      benign/with both ovaries(cervix in)       Current Outpatient Medications   Medication Sig Dispense Refill     ascorbic acid (VITAMIN C) 1000 MG TABS Take 1 tablet by mouth daily.       benzoyl peroxide 5 % topical gel Apply topically daily 60 g 1     Calcium Carb-Cholecalciferol 600-800 MG-UNIT TABS Dose unknown       cimetidine (TAGAMET) 400 MG  tablet Take 1 tablet (400 mg) by mouth At Bedtime 90 tablet 0     desonide (DESOWEN) 0.05 % external ointment Apply thin layer to affected area BID. 30 g 1     estrogen conj (PREMARIN) 0.625 MG tablet Take 1 tablet (0.625 mg) by mouth daily 90 tablet 0     GLUCOSAMINE CHONDROITIN OR TABS TAKE 3 TABLETS (1500MG-GLUCOSAMINE) DAILY 300 tab 3     MULTI-VITAMIN OR TABS 1 TABLET DAILY 35 0     nitroGLYcerin (NITRO-BID) 2 % OINT ointment 3-4 times a day. Use with 9 parts petroleum and apply to hemmrhoids. 15 g 1     Omega-3 Fatty Acids (FISH OIL) 1200 MG capsule Take 1 capsule by mouth daily.       omeprazole (PRILOSEC) 40 MG DR capsule Take 1 capsule (40 mg) by mouth daily 90 capsule 0     SUMAtriptan (IMITREX) 25 MG tablet TAKE 1 TABLET BY MOUTH AT ONSET OF MIGRAINE. MAY REPEAT IN 2 HOURS IF NEEDED. MAX OF 2 PER DAY. 9 tablet 0     vitamin  B complex with vitamin C (VITAMIN  B COMPLEX) TABS Take 1 tablet by mouth daily       vitamin E 400 UNIT capsule Take 1 capsule by mouth daily.       Physical Exam   GENERAL: thin middle aged woman in NAD   There were no vitals taken for this visit.  GENERAL: alert and no distress  SKIN: Visible skin clear.   EYES: Eyes grossly normal to inspection.  NECK: goiter is not grossly visible  RESP: No audible wheeze, cough, or visible cyanosis.  No visible retractions or increased work of breathing.    NEURO: Cranial nerves grossly intact.  Mentation and speech appropriate  PSYCH: Mentation appears normal, affect normal/bright, normal speech and appearance well-groomed..     DATA review:     US THYROID 7/9/2020 7:23 AM     COMPARISON: 5/25/2017     HISTORY: Thyroid cyst; Thyroid with heterogeneous echotexture  determined by ultrasound; History of irradiation, presenting hazards  to health     FINDINGS:   Thyroid parenchyma: Mildly heterogenous.  The right lobe of the thyroid measures: 1.8 x 1.7 x 5.2 cm  The left lobe of the thyroid measures: 1.2 x 1.4 x 4.4 cm   The thyroid isthmus  measures: 0.3 cm     Right Lobe:  Nodule 1:  Location: Upper pole  Size: 8 x 5 x 8 mm  Composition: Solid or almost completely solid (2 points)  Echogenicity: Hyperechoic or isoechoic (1 point)  Shape: Wider than tall (0 points)  Margin: Ill-defined (0 points)  Echogenic Foci: none or large comet tail artifact (0 points)  Stability: No significant change in size  TIRADS: TR3 (3 points)      Nodule 2:  Location: Lower pole  Size: 10 x 9 x 10 mm  Composition: Cystic or almost completely cystic (0 points)  Echogenicity: Anechoic (0 points)  Shape: Wider than tall (0 points)  Margin: Smooth (0 points)  Echogenic Foci: none or large comet tail artifact (0 points)  Stability: Decreasing  TIRADS: TR1 (0 points) Benign     Nodule 3:  Location: Lower pole  Size: 9 x 6 x 10 mm  Composition: Solid or almost completely solid (2 points)  Echogenicity: Hyperechoic or isoechoic (1 point)  Shape: Wider than tall (0 points)  Margin: Ill-defined (0 points)  Echogenic Foci: none or large comet tail artifact (0 points)  Stability: No significant change in size  TIRADS: TR3 (3 points)      Left Lobe:     Nodule 1:  Location: Upper pole  Size: 2 x 1 x 1 mm  Composition: Solid or almost completely solid (2 points)  Echogenicity: Hyperechoic or isoechoic (1 point)  Shape: Wider than tall (0 points)  Margin: Ill-defined (0 points)  Echogenic Foci: Macro-calcifications (1 point)  Stability: No significant change in size  TIRADS: TR4 (4-6 points)         Nodule 2:  Location: Mid  Size: 7 x 5 x 8 mm  Composition: Solid or almost completely solid (2 points)  Echogenicity: Hypoechoic (2 points)  Shape: Wider than tall (0 points)  Margin: Ill-defined (0 points)  Echogenic Foci: none or large comet tail artifact (0 points)  Stability: Enlarging, previously 5 x 3 x 7 mm  TIRADS: TR4 (4-6 points)         Nodule 3:  Location: Lower pole  Size: 10 x 6 x 12 mm   Composition: Solid or almost completely solid (2 points)  Echogenicity: Hypoechoic (2  points)  Shape: Wider than tall (0 points)  Margin: Smooth (0 points)  Echogenic Foci: none or large comet tail artifact (0 points)  Stability: No significant change in size  TIRADS: TR4 (4-6 points)      Nodule 4:  Location: Lower pole  Size: 5 x 5 x 8 mm   Composition: Solid or almost completely solid (2 points)  Echogenicity: Hypoechoic (2 points)  Shape: Wider than tall (0 points)  Margin: Smooth (0 points)  Echogenic Foci: none or large comet tail artifact (0 points)  Stability: No significant change in size  TIRADS: 4                                                                            Impression:  1. Relatively stable solid thyroid nodules. Slight enlargement of  solid nodule in the left lobe (#2).  2. None of the nodules meet criteria for biopsy by TIRADS and size  criteria.     ACR TI-RADS recommendations  TR2 (2 points) & TR1 (0 points) -No FNA or follow-up  TR3 (3 points) - FNA if ? 2.5cm, follow-up if 1.5 -2.4 cm in 1, 3 and  5 years  TR4 (4-6 points) - FNA if ? 1.5cm, follow-up if 1 -1.4 cm in 1, 2, 3  and 5 years  TR5 (?7 points) - FNA if ? 1cm, follow-up if 0.5 -0.9 cm every year  for 5 years      I have personally reviewed the examination and initial interpretation  and I agree with the findings.     JANEE JUSTICE MD

## 2020-08-06 PROBLEM — E04.1 THYROID NODULE: Status: ACTIVE | Noted: 2020-08-06

## 2020-08-17 ENCOUNTER — TELEPHONE (OUTPATIENT)
Dept: FAMILY MEDICINE | Facility: CLINIC | Age: 55
End: 2020-08-17

## 2020-08-17 DIAGNOSIS — Z11.1 SCREENING EXAMINATION FOR PULMONARY TUBERCULOSIS: ICD-10-CM

## 2020-08-17 DIAGNOSIS — Z11.1 SCREENING EXAMINATION FOR PULMONARY TUBERCULOSIS: Primary | ICD-10-CM

## 2020-08-17 PROCEDURE — 86481 TB AG RESPONSE T-CELL SUSP: CPT | Performed by: PHYSICIAN ASSISTANT

## 2020-08-17 PROCEDURE — 36415 COLL VENOUS BLD VENIPUNCTURE: CPT | Performed by: PHYSICIAN ASSISTANT

## 2020-08-17 NOTE — TELEPHONE ENCOUNTER
Patient notified-- lab only appt scheduled.  She stated she would like to have Robbin Vega as PCP. Chart updated    Mahi Gillespie MA

## 2020-08-17 NOTE — TELEPHONE ENCOUNTER
Reason for Call:  Other call back    Detailed comments: patient is requesting a lab order to have the TB quantiferon gold blood test. She works at [employer] and is needing this test as soon as possible.    Please call patient and let her know once the lab is ordered, or if there is any other information needed    Phone Number Patient can be reached at: Home number on file [phone] (home)    Best Time: asap    Can we leave a detailed message on this number? YES    Call taken on 8/17/2020 at 10:53 AM by Jorge Velez

## 2020-08-17 NOTE — TELEPHONE ENCOUNTER
I placed orders. This is Lorenzo's patient, unless she transferred to me, FYI, can clarify with her and update. I vaguely recall her mentioning a switch, but I can't recall.   Thanks.  Otilio Vega, [employer], PA-C

## 2020-08-19 LAB
GAMMA INTERFERON BACKGROUND BLD IA-ACNC: 0.04 IU/ML
M TB IFN-G CD4+ BCKGRND COR BLD-ACNC: 9.96 IU/ML
M TB TUBERC IFN-G BLD QL: NEGATIVE
MITOGEN IGNF BCKGRD COR BLD-ACNC: 0 IU/ML
MITOGEN IGNF BCKGRD COR BLD-ACNC: 0.02 IU/ML

## 2020-08-26 NOTE — PROGRESS NOTES
"Amanda Sifuentes is a 55 year old female who is being evaluated via a billable telephone visit.      The patient has been notified of following:     \"This telephone visit will be conducted via a call between you and your physician/provider. We have found that certain health care needs can be provided without the need for a physical exam.  This service lets us provide the care you need with a short phone conversation.  If a prescription is necessary we can send it directly to your pharmacy.  If lab work is needed we can place an order for that and you can then stop by our lab to have the test done at a later time.    Telephone visits are billed at different rates depending on your insurance coverage. During this emergency period, for some insurers they may be billed the same as an in-person visit.  Please reach out to your insurance provider with any questions.    If during the course of the call the physician/provider feels a telephone visit is not appropriate, you will not be charged for this service.\"    Patient has given verbal consent for Telephone visit?  Yes    What phone number would you like to be contacted at? [phone]    How would you like to obtain your AVS? Flort    Subjective     Amanda Sifuentes is a 55 year old female who presents via phone visit today for the following health issues:    HPI     Patient is wanting yearly refills because physical was pushed back to July.     Patient asks specifically for the Nitro-Bid ointment sig to say that the pharmacist mix the ointment.     Migraine     Since your last clinic visit, how have your headaches changed?  No change    How often are you getting headaches or migraines? Coming more frequent maybe due to covid19 and job.      Are you able to do normal daily activities when you have a migraine? Varies, depending on if patient is able to catch migraine before it gets bad, sometimes patient is in bed all day.     Are you taking rescue/relief " Health Maintenance Due   Topic Date Due   • Pneumococcal Vaccine 0-64 (1 of 1 - PPSV23) [birth year]1980       Patient is due for topics as listed above but is not proceeding with Immunization(s) Pneumococcal at this time. Education provided for Immunization(s) Pneumococcal.         medications? (Select all that apply) sumatriptan (Imitrex)    How helpful is your rescue/relief medication?  I get total relief, patient says it is situational    Are you taking any medications to prevent migraines? (Select all that apply)  No    In the past 4 weeks, how often have you gone to urgent care or the emergency room because of your headaches?  0      How many servings of fruits and vegetables do you eat daily?  2-3    On average, how many sweetened beverages do you drink each day (Examples: soda, juice, sweet tea, etc.  Do NOT count diet or artificially sweetened beverages)?  0    How many days per week do you exercise enough to make your heart beat faster? 4    How many minutes a day do you exercise enough to make your heart beat faster? 50-90 mins    How many days per week do you miss taking your medication? 0    The patient just needed refills to get her to her July 20 physical with PCP - was scheduled as an appointment in error. We did not have a phone visit. I spoke with her for 1 minute and 30 seconds to confirm her doses.     No charge.    Otilio Vega, MPAS, PA-C

## 2020-12-10 ENCOUNTER — OFFICE VISIT (OUTPATIENT)
Dept: FAMILY MEDICINE | Facility: CLINIC | Age: 55
End: 2020-12-10
Payer: COMMERCIAL

## 2020-12-10 VITALS
WEIGHT: 146 LBS | TEMPERATURE: 97.6 F | HEART RATE: 73 BPM | DIASTOLIC BLOOD PRESSURE: 86 MMHG | OXYGEN SATURATION: 100 % | HEIGHT: 64 IN | SYSTOLIC BLOOD PRESSURE: 124 MMHG | BODY MASS INDEX: 24.92 KG/M2

## 2020-12-10 DIAGNOSIS — J30.2 SEASONAL ALLERGIC RHINITIS, UNSPECIFIED TRIGGER: ICD-10-CM

## 2020-12-10 DIAGNOSIS — H81.12 BENIGN PAROXYSMAL POSITIONAL VERTIGO, LEFT: Primary | ICD-10-CM

## 2020-12-10 PROCEDURE — 99213 OFFICE O/P EST LOW 20 MIN: CPT | Performed by: FAMILY MEDICINE

## 2020-12-10 ASSESSMENT — PAIN SCALES - GENERAL: PAINLEVEL: NO PAIN (0)

## 2020-12-10 ASSESSMENT — MIFFLIN-ST. JEOR: SCORE: 1238.12

## 2020-12-10 NOTE — PROGRESS NOTES
Subjective     Amanda Sifuentes is a 55 year old female who presents to clinic today for the following health issues:    HPI   Patient comes report since last Friday she is feeling more dizzy especially at night when she sleeps on her left side turn her head, or move to the other side. feeling  Pressure and fullness in her left ear.    She has no other symptoms, no nausea, no vomiting, no stomach upset.  Has not had fever, no sinus congestion. Has no headache. No runny nose.  She does report fullness pressure in the left ear sometimes feeling fluid buildup.  No history of travel or swimming.    Denies chest pain palpitation denies heart racing.  Denies being lightheaded or dizzy.    Since Friday symptoms been getting better.    Dizziness  Onset/Duration: Friday  Description:   Do you feel faint: no  Does it feel like the surroundings (bed, room) are moving: inside of head  Unsteady/off balance: no, happens while laying in bed  Have you passed out or fallen: no  Intensity: moderate  Progression of Symptoms: improving and constant  Accompanying Signs & Symptoms:  Heart palpitations or chest pain: no  Nausea, vomiting: no  Weakness or lack of coordination in arms or legs: no  Vision or speech changes: no  Numbness or tingling: no  Ringing in ears (Tinnitus): YES- right ear  Hearing Loss: no  History:   Head trauma/concussion history: no  Previous similar symptoms: no  Recent bleeding history: no  Any new medications (BP?): no  Precipitating factors:   Worse with activity: no  Worse with head movement: no  Alleviating factors:   Does staying in a fixed position give relief: YES  Therapies tried and outcome: None    Review of Systems   Constitutional, HEENT, cardiovascular, pulmonary, GI, , musculoskeletal, neuro, skin, endocrine and psych systems are negative, except as otherwise noted.      Objective    There were no vitals taken for this visit.  There is no height or weight on file to calculate BMI.  Physical Exam    GENERAL: healthy, alert and no distress  HENT: ear canals and TM's normal, nose and mouth without ulcers or lesions  NECK: no adenopathy, no asymmetry, masses, or scars and thyroid normal to palpation  RESP: lungs clear to auscultation - no rales, rhonchi or wheezes  CV: regular rate and rhythm, normal S1 S2, no S3 or S4, no murmur, click or rub, no peripheral edema and peripheral pulses strong  ABDOMEN: soft, nontender, no hepatosplenomegaly, no masses and bowel sounds normal  MS: no gross musculoskeletal defects noted, no edema  NEURO: Normal strength and tone, mentation intact and speech normal  PSYCH: mentation appears normal, affect normal/bright          Assessment & Plan     Benign paroxysmal positional vertigo, left  Likely benign positional vertigo,  improving.  Discussed with patient Epley maneuver, other maneuvers which she could perform at home. Hand out given to pt.  Discussed diet modification.    Seasonal allergic rhinitis, unspecified trigger  She may try over-the-counter antihistamine such as Zyrtec or Flonase at bedtime.          Patient Instructions       Patient Education     Benign Paroxysmal Positional Vertigo     Your health care provider may move your head in certain ways to treat your BPPV.     Benign paroxysmal positional vertigo (BPPV) is a problem with the inner ear. The inner ear contains the vestibular system. This system is what helps you keep your balance. BPPV causes a feeling of spinning. It is a common problem of the vestibular system.  Understanding the vestibular system  The vestibular system of the ear is made up of very tiny parts. They include the utricle, saccule, and semicircular canals. The utricle is a tiny organ that contains calcium crystals. In some people, the crystals can move into the semicircular canals. When this happens, the system no longer works as it should. This causes BPPV. Benign means it is not life threatening. Paroxysmal means it happens suddenly.  Positional means that it happens when you move your head. Vertigo is a feeling of spinning.  What causes BPPV?  Causes include injury to your head or neck. Other problems with the vestibular system may cause BPPV. In many people, the cause of BPPV is not known.  Symptoms of BPPV  You many have repeated feelings of spinning (vertigo). The vertigo usually lasts less than 1 minute. Some movements, such as rolling over in bed, can bring on vertigo.  Diagnosing BPPV  Your primary healthcare provider may diagnose and treat your BPPV. Or you may see an ear, nose, and throat doctor (otolaryngologist). In some cases, you may see a nervous system doctor (neurologist).  The healthcare provider will ask about your symptoms and your medical history. He or she will examine you. You may have hearing and balance tests. As part of the exam, your healthcare provider may have you move your head and body in certain ways. If you have BPPV, the movements can bring on vertigo. Your provider will also look for abnormal movements of your eyes. You may have other tests to check your vestibular or nervous systems.  Treatment for BPPV  Your healthcare provider may try to move the calcium crystals. This is done by having you move your head and neck in certain ways. This treatment is safe and often works well. You may also be told to do these movements at home. You may still have vertigo for a few weeks. Your healthcare provider will recheck your symptoms, usually in about a month. Special physical therapy may also be part of treatment. In rare cases, surgery may be needed for BPPV that does not go away.     When to call the healthcare provider  Call your healthcare provider right away if you have any of these:    Symptoms that do not go away with treatment    Symptoms that get worse    New symptoms   [employer] last reviewed this educational content on 5/1/2017 2000-2020 [employer]. [address], [place] [address]. All  rights reserved. This information is not intended as a substitute for professional medical care. Always follow your healthcare professional's instructions.               No follow-ups on file.    Gissel Dc MD  [facility]

## 2020-12-10 NOTE — PATIENT INSTRUCTIONS
Patient Education     Benign Paroxysmal Positional Vertigo     Your health care provider may move your head in certain ways to treat your BPPV.     Benign paroxysmal positional vertigo (BPPV) is a problem with the inner ear. The inner ear contains the vestibular system. This system is what helps you keep your balance. BPPV causes a feeling of spinning. It is a common problem of the vestibular system.  Understanding the vestibular system  The vestibular system of the ear is made up of very tiny parts. They include the utricle, saccule, and semicircular canals. The utricle is a tiny organ that contains calcium crystals. In some people, the crystals can move into the semicircular canals. When this happens, the system no longer works as it should. This causes BPPV. Benign means it is not life threatening. Paroxysmal means it happens suddenly. Positional means that it happens when you move your head. Vertigo is a feeling of spinning.  What causes BPPV?  Causes include injury to your head or neck. Other problems with the vestibular system may cause BPPV. In many people, the cause of BPPV is not known.  Symptoms of BPPV  You many have repeated feelings of spinning (vertigo). The vertigo usually lasts less than 1 minute. Some movements, such as rolling over in bed, can bring on vertigo.  Diagnosing BPPV  Your primary healthcare provider may diagnose and treat your BPPV. Or you may see an ear, nose, and throat doctor (otolaryngologist). In some cases, you may see a nervous system doctor (neurologist).  The healthcare provider will ask about your symptoms and your medical history. He or she will examine you. You may have hearing and balance tests. As part of the exam, your healthcare provider may have you move your head and body in certain ways. If you have BPPV, the movements can bring on vertigo. Your provider will also look for abnormal movements of your eyes. You may have other tests to check your vestibular or nervous  systems.  Treatment for BPPV  Your healthcare provider may try to move the calcium crystals. This is done by having you move your head and neck in certain ways. This treatment is safe and often works well. You may also be told to do these movements at home. You may still have vertigo for a few weeks. Your healthcare provider will recheck your symptoms, usually in about a month. Special physical therapy may also be part of treatment. In rare cases, surgery may be needed for BPPV that does not go away.     When to call the healthcare provider  Call your healthcare provider right away if you have any of these:    Symptoms that do not go away with treatment    Symptoms that get worse    New symptoms   [place] last reviewed this educational content on 5/1/2017 2000-2020 [employer] [address] [facility], [place] [address]. All rights reserved. This information is not intended as a substitute for professional medical care. Always follow your healthcare professional's instructions.

## 2020-12-29 ENCOUNTER — MYC MEDICAL ADVICE (OUTPATIENT)
Dept: FAMILY MEDICINE | Facility: CLINIC | Age: 55
End: 2020-12-29

## 2020-12-29 DIAGNOSIS — H10.13 ALLERGIC CONJUNCTIVITIS OF BOTH EYES: ICD-10-CM

## 2021-01-02 RX ORDER — OLOPATADINE HYDROCHLORIDE 1 MG/ML
1 SOLUTION/ DROPS OPHTHALMIC 2 TIMES DAILY
Qty: 1 BOTTLE | Refills: 11 | Status: SHIPPED | OUTPATIENT
Start: 2021-01-02 | End: 2022-07-19

## 2021-02-18 ENCOUNTER — MYC MEDICAL ADVICE (OUTPATIENT)
Dept: FAMILY MEDICINE | Facility: CLINIC | Age: 56
End: 2021-02-18

## 2021-02-19 ENCOUNTER — IMMUNIZATION (OUTPATIENT)
Dept: NURSING | Facility: CLINIC | Age: 56
End: 2021-02-19
Payer: COMMERCIAL

## 2021-02-19 PROCEDURE — 91300 PR COVID VAC PFIZER DIL RECON 30 MCG/0.3 ML IM: CPT

## 2021-02-19 PROCEDURE — 0001A PR COVID VAC PFIZER DIL RECON 30 MCG/0.3 ML IM: CPT

## 2021-03-12 ENCOUNTER — IMMUNIZATION (OUTPATIENT)
Dept: NURSING | Facility: CLINIC | Age: 56
End: 2021-03-12
Attending: FAMILY MEDICINE
Payer: COMMERCIAL

## 2021-03-12 PROCEDURE — 91300 PR COVID VAC PFIZER DIL RECON 30 MCG/0.3 ML IM: CPT

## 2021-03-12 PROCEDURE — 0002A PR COVID VAC PFIZER DIL RECON 30 MCG/0.3 ML IM: CPT

## 2021-04-19 ENCOUNTER — OFFICE VISIT (OUTPATIENT)
Dept: FAMILY MEDICINE | Facility: CLINIC | Age: 56
End: 2021-04-19
Payer: COMMERCIAL

## 2021-04-19 VITALS
BODY MASS INDEX: 23.53 KG/M2 | RESPIRATION RATE: 12 BRPM | HEIGHT: 65 IN | WEIGHT: 141.2 LBS | DIASTOLIC BLOOD PRESSURE: 86 MMHG | TEMPERATURE: 98.2 F | HEART RATE: 72 BPM | SYSTOLIC BLOOD PRESSURE: 128 MMHG

## 2021-04-19 DIAGNOSIS — Z01.818 PREOP GENERAL PHYSICAL EXAM: Primary | ICD-10-CM

## 2021-04-19 DIAGNOSIS — N64.2 BREAST ATROPHY: ICD-10-CM

## 2021-04-19 LAB
BASOPHILS # BLD AUTO: 0 10E9/L (ref 0–0.2)
BASOPHILS NFR BLD AUTO: 0.8 %
DIFFERENTIAL METHOD BLD: ABNORMAL
EOSINOPHIL # BLD AUTO: 0.1 10E9/L (ref 0–0.7)
EOSINOPHIL NFR BLD AUTO: 2.3 %
ERYTHROCYTE [DISTWIDTH] IN BLOOD BY AUTOMATED COUNT: 12.4 % (ref 10–15)
HCT VFR BLD AUTO: 41.4 % (ref 35–47)
HGB BLD-MCNC: 13.8 G/DL (ref 11.7–15.7)
LYMPHOCYTES # BLD AUTO: 1.6 10E9/L (ref 0.8–5.3)
LYMPHOCYTES NFR BLD AUTO: 41.2 %
MCH RBC QN AUTO: 30.3 PG (ref 26.5–33)
MCHC RBC AUTO-ENTMCNC: 33.3 G/DL (ref 31.5–36.5)
MCV RBC AUTO: 91 FL (ref 78–100)
MONOCYTES # BLD AUTO: 0.5 10E9/L (ref 0–1.3)
MONOCYTES NFR BLD AUTO: 12 %
NEUTROPHILS # BLD AUTO: 1.7 10E9/L (ref 1.6–8.3)
NEUTROPHILS NFR BLD AUTO: 43.7 %
PLATELET # BLD AUTO: 296 10E9/L (ref 150–450)
RBC # BLD AUTO: 4.56 10E12/L (ref 3.8–5.2)
WBC # BLD AUTO: 3.9 10E9/L (ref 4–11)

## 2021-04-19 PROCEDURE — 85025 COMPLETE CBC W/AUTO DIFF WBC: CPT | Performed by: PHYSICIAN ASSISTANT

## 2021-04-19 PROCEDURE — 99214 OFFICE O/P EST MOD 30 MIN: CPT | Performed by: PHYSICIAN ASSISTANT

## 2021-04-19 PROCEDURE — 36415 COLL VENOUS BLD VENIPUNCTURE: CPT | Performed by: PHYSICIAN ASSISTANT

## 2021-04-19 PROCEDURE — 93000 ELECTROCARDIOGRAM COMPLETE: CPT | Performed by: PHYSICIAN ASSISTANT

## 2021-04-19 ASSESSMENT — MIFFLIN-ST. JEOR
SCORE: 1214.67
SCORE: 1216.35

## 2021-04-19 ASSESSMENT — PAIN SCALES - GENERAL: PAINLEVEL: NO PAIN (0)

## 2021-04-19 NOTE — PROGRESS NOTES
[address] [address]  Phone: [phone]  Primary Provider: Bryant Vega  Pre-op Performing Provider: BRYANT VEGA  PREOPERATIVE EVALUATION:  Today's date: 4/19/2021    Amanda Sifuentes is a 56 year old female who presents for a preoperative evaluation.    Surgical Information:  Surgery/Procedure: Breast Lift  Surgery Location: [facility]   Surgeon: Dr. Duque  Surgery Date: 05/06/21  Time of Surgery: 8:30 AM  Where patient plans to recover: At home with family  Fax number for surgical facility: [phone]    Type of Anesthesia Anticipated: General    Assessment & Plan     The proposed surgical procedure is considered INTERMEDIATE risk.    Preop general physical exam  Cleared with no restrictions.   - CBC with platelets and differential  - EKG 12-lead complete w/read - Clinics  - EKG 12-lead complete w/read - Clinics    Breast atrophy  Planned lift     RECOMMENDATION:  APPROVAL GIVEN to proceed with proposed procedure, without further diagnostic evaluation.    Review of external notes as documented above       Subjective     HPI related to upcoming procedure: Bilateral breast lift     Preop Questions 4/19/2021   1. Have you ever had a heart attack or stroke? No   2. Have you ever had surgery on your heart or blood vessels, such as a stent placement, a coronary artery bypass, or surgery on an artery in your head, neck, heart, or legs? No   3. Do you have chest pain with activity? No   4. Do you have a history of  heart failure? No   5. Do you currently have a cold, bronchitis or symptoms of other infection? No   6. Do you have a cough, shortness of breath, or wheezing? No   7. Do you or anyone in your family have previous history of blood clots? No   8. Do you or does anyone in your family have a serious bleeding problem such as prolonged bleeding following surgeries or cuts? No   9. Have you ever had problems with  anemia or been told to take iron pills? YES - Distant past when menstruating    10. Have you had any abnormal blood loss such as black, tarry or bloody stools, or abnormal vaginal bleeding? No   11. Have you ever had a blood transfusion? No   12. Are you willing to have a blood transfusion if it is medically needed before, during, or after your surgery? Yes   13. Have you or any of your relatives ever had problems with anesthesia? No   14. Do you have sleep apnea, excessive snoring or daytime drowsiness? No   15. Do you have any artifical heart valves or other implanted medical devices like a pacemaker, defibrillator, or continuous glucose monitor? No   16. Do you have artificial joints? No   17. Are you allergic to latex? No   18. Is there any chance that you may be pregnant? No       Health Care Directive:  Patient does not have a Health Care Directive or Living Will: Does not. Did discuss.     Preoperative Review of :   reviewed - no record of controlled substances prescribed.    Review of Systems  Constitutional, neuro, ENT, endocrine, pulmonary, cardiac, gastrointestinal, genitourinary, musculoskeletal, integument and psychiatric systems are negative, except as otherwise noted.    Patient Active Problem List    Diagnosis Date Noted     Other type of migraine 11/23/2004     Priority: High     Diagnosis updated by automated process. Provider to review and confirm.       Esophageal reflux 11/23/2004     Priority: High     Thyroid nodule 08/06/2020     Priority: Medium     Cervical cancer screening      Priority: Medium     Date  Cervix A   Cervix B  12/27/12 NIL pap, neg HR HPV.  NIL pap, neg HR HPV.  2/9/15  NIL pap, neg HR HPV.  NIL pap, neg HR HPV. Plan: cotest in 5 years  4/9/18  NIL pap, neg HR HPV.  NIL pap, neg HR HPV. Plan: pap in 3 years.       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     Priority: Medium     Solar elastosis 07/07/2012     Priority: Medium     AK (actinic keratosis) 07/07/2012     Priority: Medium     History of hysterectomy for benign disease 12/27/2011     Priority: Medium     She had a supra-cervical hysterectomy of a bicornate uterus        Eczema 12/27/2011     Priority: Medium     Family history of colon cancer 12/29/2010     Priority: Medium     Grandparents (2) ages late 60's, and early 70's       CARDIOVASCULAR SCREENING; LDL GOAL LESS THAN 160 05/09/2010     Priority: Medium     Other acne 12/07/2007     Priority: Medium     Hemorrhoids 11/23/2004     Priority: Medium     Problem list name updated by automated process. Provider to review        Past Medical History:   Diagnosis Date     Esophageal reflux      Family history of colon cancer      Globus sensation      Multiple thyroid nodules     2009, 2013 FNAB benign     Other forms of migraine, without mention of intractable migraine without mention of status migrainosus      Unspecified hemorrhoids without mention of complication      Past Surgical History:   Procedure Laterality Date     COLONOSCOPY  5/20/2013    Procedure: COLONOSCOPY;;  Surgeon: Jim Flores MD;  Location:  GI     COLONOSCOPY N/A 1/11/2019    Procedure: COLONOSCOPY;  Surgeon: Luisito Barnard MD;  Location: [facility]     HC EXCISION BREAST LESION, OPEN >=1      benign     HEMORRHOID SURGERY       HYSTERECTOMY, PAP STILL INDICATED      benign/with both ovaries(cervix in)     Current Outpatient Medications   Medication Sig  Dispense Refill     ascorbic acid (VITAMIN C) 1000 MG TABS Take 1 tablet by mouth daily.       Calcium Carb-Cholecalciferol 600-800 MG-UNIT TABS Dose unknown       cimetidine (TAGAMET) 400 MG tablet Take 1 tablet (400 mg) by mouth At Bedtime 90 tablet 3     desonide (DESOWEN) 0.05 % external ointment Apply thin layer to affected area BID. 30 g 1     estrogen conj (PREMARIN) 0.625 MG tablet Take 1 tablet (0.625 mg) by mouth daily 90 tablet 3     GLUCOSAMINE CHONDROITIN OR TABS TAKE 3 TABLETS (1500MG-GLUCOSAMINE) DAILY 300 tab 3     MULTI-VITAMIN OR TABS 1 TABLET DAILY 35 0     nitroGLYcerin (NITRO-BID) 2 % OINT ointment 3-4 times a day. Use with 9 parts petroleum and apply to hemmrhoids. 15 g 1     olopatadine (PATANOL) 0.1 % ophthalmic solution Place 1 drop into both eyes 2 times daily 1 Bottle 11     Omega-3 Fatty Acids (FISH OIL) 1200 MG capsule Take 1 capsule by mouth daily.       omeprazole (PRILOSEC) 40 MG DR capsule Take 1 capsule (40 mg) by mouth daily 90 capsule 3     SUMAtriptan (IMITREX) 25 MG tablet TAKE 1 TABLET BY MOUTH AT ONSET OF MIGRAINE. MAY REPEAT IN 2 HOURS IF NEEDED. MAX OF 2 PER DAY. 9 tablet 11     vitamin  B complex with vitamin C (VITAMIN  B COMPLEX) TABS Take 1 tablet by mouth daily       vitamin E 400 UNIT capsule Take 1 capsule by mouth daily.       benzoyl peroxide 5 % topical gel Apply topically daily (Patient not taking: Reported on 4/19/2021) 60 g 1       No Known Allergies     Social History     Tobacco Use     Smoking status: Never Smoker     Smokeless tobacco: Never Used   Substance Use Topics     Alcohol use: Yes     Comment: 3 drinks per wek      Family History   Problem Relation Age of Onset     Diabetes Maternal Grandfather      Cancer - colorectal Maternal Grandfather         70s     Hypertension Father      Prostate Cancer Father      Cancer Father         Basal Cell Carcinoma     Cerebrovascular Disease Maternal Grandmother      Cancer - colorectal Maternal Grandmother         " 70s     Cardiovascular Mother         MI in her 70's(4 stents)     Depression Mother      History   Drug Use No         Objective     /86 (BP Location: Right arm, Patient Position: Chair, Cuff Size: Adult Regular)   Pulse 72   Temp 98.2  F (36.8  C) (Oral)   Resp 12   Ht 1.64 m (5' 4.58\")   Wt 64 kg (141 lb 3.2 oz)   LMP  (LMP Unknown)   Breastfeeding No   BMI 23.80 kg/m      Physical Exam    GENERAL APPEARANCE: healthy, alert and no distress     EYES: EOMI,  PERRL     HENT: ear canals and TM's normal and nose and mouth without ulcers or lesions     NECK: no adenopathy, no asymmetry, masses, or scars and thyroid normal to palpation     RESP: lungs clear to auscultation - no rales, rhonchi or wheezes     CV: regular rates and rhythm, normal S1 S2, no S3 or S4 and no murmur, click or rub     ABDOMEN:  soft, nontender, no HSM or masses and bowel sounds normal     MS: extremities normal- no gross deformities noted, no evidence of inflammation in joints, FROM in all extremities.     SKIN: no suspicious lesions or rashes     NEURO: Normal strength and tone, sensory exam grossly normal, mentation intact and speech normal     PSYCH: mentation appears normal. and affect normal/bright     LYMPHATICS: No cervical adenopathy    Recent Labs   Lab Test 07/15/20  0716   HGB 14.0        Diagnostics:  Lab Results   Component Value Date    HGB 13.8 04/19/2021     Lab Results   Component Value Date    HCT 41.4 04/19/2021         EKG required for Surgeon preference and not completed in the last 90 days.    EKG - Normal     Revised Cardiac Risk Index (RCRI):  The patient has the following serious cardiovascular risks for perioperative complications:   - No serious cardiac risks = 0 points     RCRI Interpretation: 0 points: Class I (very low risk - 0.4% complication rate)      Signed Electronically by: BRYANT HIGGINS PA-C  Copy of this evaluation report is provided to requesting physician.      "

## 2021-04-19 NOTE — PATIENT INSTRUCTIONS

## 2021-05-07 ENCOUNTER — TELEPHONE (OUTPATIENT)
Dept: FAMILY MEDICINE | Facility: CLINIC | Age: 56
End: 2021-05-07

## 2021-05-07 DIAGNOSIS — Z13.220 SCREENING CHOLESTEROL LEVEL: Primary | ICD-10-CM

## 2021-05-07 DIAGNOSIS — Z02.1 ENCOUNTER FOR PRE-EMPLOYMENT EXAMINATION: ICD-10-CM

## 2021-05-07 NOTE — TELEPHONE ENCOUNTER
"Patient needs titer for \"chicken pox\" titer ASAP for credentialing for work as she will be traveling.     Quantiferon Gold isn't due till 8/17/2020 ( can it be done at 7/15/21 lab apt?)    She would also like her yearly labs orders so they can be done before physical (lab already scheduled for 7/15)      Pended varicella titer, Q-Gold test, BMP, lipid panel.  Please add any additional labs needed.    Patient would like a call back once done, ok to leave a detailed message.        Ashley Fonseca RN  "

## 2021-05-07 NOTE — TELEPHONE ENCOUNTER
I signed orders. She can do them whenever is convenient/needed.   Thanks.  Otilio Vega, MPAS, PA-C

## 2021-05-07 NOTE — TELEPHONE ENCOUNTER
Reason for Call:  Other call back    Detailed comments: Needs a test done proving she has not had chicken pox also need a mantoux quantiferon - tb gold plus shot. For the Mantoux she would like to know if she can get this done before her 1 year ana or if she has to wait for 1 full year.  Also wants fasting labs ordered for her physical.   Please call once ordered, or with questions.    Phone Number Patient can be reached at: Cell number on file:    Telephone Information:   Mobile [phone]       Best Time: Any    Can we leave a detailed message on this number? YES    Call taken on 5/7/2021 at 7:50 AM by Sherin Barlow

## 2021-05-07 NOTE — TELEPHONE ENCOUNTER
Called patient and appointment scheduled for 5/10/21 for lab varicella titer    All other lab order placed         Ashley Fonseca RN

## 2021-05-13 DIAGNOSIS — Z02.1 ENCOUNTER FOR PRE-EMPLOYMENT EXAMINATION: ICD-10-CM

## 2021-05-13 PROCEDURE — 86787 VARICELLA-ZOSTER ANTIBODY: CPT | Performed by: PHYSICIAN ASSISTANT

## 2021-05-13 PROCEDURE — 36415 COLL VENOUS BLD VENIPUNCTURE: CPT | Performed by: PHYSICIAN ASSISTANT

## 2021-05-17 LAB — VZV IGG SER QL IA: 2.1 AI (ref 0–0.8)

## 2021-05-21 ENCOUNTER — ALLIED HEALTH/NURSE VISIT (OUTPATIENT)
Dept: NURSING | Facility: CLINIC | Age: 56
End: 2021-05-21
Payer: COMMERCIAL

## 2021-05-21 VITALS — TEMPERATURE: 98 F

## 2021-05-21 DIAGNOSIS — Z23 NEED FOR SHINGLES VACCINE: Primary | ICD-10-CM

## 2021-05-21 PROCEDURE — 99207 PR NO CHARGE NURSE ONLY: CPT

## 2021-05-21 PROCEDURE — 90750 HZV VACC RECOMBINANT IM: CPT

## 2021-05-21 PROCEDURE — 90471 IMMUNIZATION ADMIN: CPT

## 2021-05-21 NOTE — PROGRESS NOTES
1.  Has the patient received the information for the Zostavax vaccine? YES    2.  Does the patient have any of the following contraindications?     Allergy to Neomycin or Gelatin? No       Current moderate or severe illness? No  Temp = 98  If temp > 99.0 degrees orally or patient has above allergies, vaccine is deferred    3.  The vaccine has been administered in the usual fashion and the patient was instructed to wait 15 minutes before leaving the building in the event of an allergic reaction: YES    Vaccination given by Delma Jeter CMA (AAMA)  Recorded by Delma Jeter CMA

## 2021-07-15 ENCOUNTER — LAB (OUTPATIENT)
Dept: LAB | Facility: CLINIC | Age: 56
End: 2021-07-15
Payer: COMMERCIAL

## 2021-07-15 DIAGNOSIS — Z02.1 ENCOUNTER FOR PRE-EMPLOYMENT EXAMINATION: ICD-10-CM

## 2021-07-15 PROCEDURE — 80048 BASIC METABOLIC PNL TOTAL CA: CPT

## 2021-07-15 PROCEDURE — 80061 LIPID PANEL: CPT

## 2021-07-15 PROCEDURE — 36415 COLL VENOUS BLD VENIPUNCTURE: CPT

## 2021-07-15 PROCEDURE — 86481 TB AG RESPONSE T-CELL SUSP: CPT

## 2021-07-16 LAB
ANION GAP SERPL CALCULATED.3IONS-SCNC: 8 MMOL/L (ref 3–14)
BUN SERPL-MCNC: 12 MG/DL (ref 7–30)
CALCIUM SERPL-MCNC: 9.4 MG/DL (ref 8.5–10.1)
CHLORIDE BLD-SCNC: 104 MMOL/L (ref 94–109)
CHOLEST SERPL-MCNC: 265 MG/DL
CO2 SERPL-SCNC: 23 MMOL/L (ref 20–32)
CREAT SERPL-MCNC: 0.77 MG/DL (ref 0.52–1.04)
FASTING STATUS PATIENT QL REPORTED: YES
GFR SERPL CREATININE-BSD FRML MDRD: 85 ML/MIN/1.73M2
GLUCOSE BLD-MCNC: 74 MG/DL (ref 70–99)
HDLC SERPL-MCNC: 76 MG/DL
LDLC SERPL CALC-MCNC: 162 MG/DL
NONHDLC SERPL-MCNC: 189 MG/DL
POTASSIUM BLD-SCNC: 4.1 MMOL/L (ref 3.4–5.3)
SODIUM SERPL-SCNC: 135 MMOL/L (ref 133–144)
TRIGL SERPL-MCNC: 137 MG/DL

## 2021-07-18 LAB
QUANTIFERON MITOGEN: 10 IU/ML
QUANTIFERON NIL TUBE: 0.05 IU/ML
QUANTIFERON TB1 TUBE: 0.07 IU/ML
QUANTIFERON TB2 TUBE: 0.06

## 2021-07-19 ENCOUNTER — OFFICE VISIT (OUTPATIENT)
Dept: OBGYN | Facility: CLINIC | Age: 56
End: 2021-07-19
Payer: COMMERCIAL

## 2021-07-19 ENCOUNTER — OFFICE VISIT (OUTPATIENT)
Dept: FAMILY MEDICINE | Facility: CLINIC | Age: 56
End: 2021-07-19
Payer: COMMERCIAL

## 2021-07-19 VITALS
DIASTOLIC BLOOD PRESSURE: 88 MMHG | HEART RATE: 68 BPM | SYSTOLIC BLOOD PRESSURE: 142 MMHG | BODY MASS INDEX: 23.76 KG/M2 | OXYGEN SATURATION: 100 % | TEMPERATURE: 97.9 F | HEIGHT: 65 IN | WEIGHT: 142.6 LBS

## 2021-07-19 VITALS
OXYGEN SATURATION: 100 % | SYSTOLIC BLOOD PRESSURE: 151 MMHG | DIASTOLIC BLOOD PRESSURE: 88 MMHG | HEIGHT: 66 IN | WEIGHT: 144.4 LBS | BODY MASS INDEX: 23.21 KG/M2 | HEART RATE: 66 BPM

## 2021-07-19 DIAGNOSIS — Z12.4 SCREENING FOR MALIGNANT NEOPLASM OF CERVIX: ICD-10-CM

## 2021-07-19 DIAGNOSIS — L70.0 ACNE VULGARIS: ICD-10-CM

## 2021-07-19 DIAGNOSIS — K21.9 GASTROESOPHAGEAL REFLUX DISEASE WITHOUT ESOPHAGITIS: ICD-10-CM

## 2021-07-19 DIAGNOSIS — Z12.4 ENCOUNTER FOR SCREENING FOR CERVICAL CANCER: Primary | ICD-10-CM

## 2021-07-19 DIAGNOSIS — K64.9 HEMORRHOIDS, UNSPECIFIED HEMORRHOID TYPE: ICD-10-CM

## 2021-07-19 DIAGNOSIS — N95.1 MENOPAUSAL SYNDROME (HOT FLASHES): ICD-10-CM

## 2021-07-19 DIAGNOSIS — G43.809 OTHER MIGRAINE, NOT INTRACTABLE, WITHOUT STATUS MIGRAINOSUS: ICD-10-CM

## 2021-07-19 DIAGNOSIS — Z00.00 ROUTINE GENERAL MEDICAL EXAMINATION AT A HEALTH CARE FACILITY: Primary | ICD-10-CM

## 2021-07-19 DIAGNOSIS — Q52.129 LONGITUDINAL VAGINAL SEPTUM: ICD-10-CM

## 2021-07-19 DIAGNOSIS — Q51.820 DOUBLE CERVIX: ICD-10-CM

## 2021-07-19 LAB
GAMMA INTERFERON BACKGROUND BLD IA-ACNC: 0.05 IU/ML
M TB IFN-G BLD-IMP: NEGATIVE
M TB IFN-G CD4+ BCKGRND COR BLD-ACNC: 9.95 IU/ML
MITOGEN IGNF BCKGRD COR BLD-ACNC: 0.01 IU/ML
MITOGEN IGNF BCKGRD COR BLD-ACNC: 0.02 IU/ML

## 2021-07-19 PROCEDURE — 99202 OFFICE O/P NEW SF 15 MIN: CPT | Performed by: OBSTETRICS & GYNECOLOGY

## 2021-07-19 PROCEDURE — G0145 SCR C/V CYTO,THINLAYER,RESCR: HCPCS | Mod: 59 | Performed by: OBSTETRICS & GYNECOLOGY

## 2021-07-19 PROCEDURE — 87624 HPV HI-RISK TYP POOLED RSLT: CPT | Performed by: OBSTETRICS & GYNECOLOGY

## 2021-07-19 PROCEDURE — 99396 PREV VISIT EST AGE 40-64: CPT | Performed by: PHYSICIAN ASSISTANT

## 2021-07-19 RX ORDER — DESONIDE 0.5 MG/G
OINTMENT TOPICAL
Qty: 30 G | Refills: 1 | Status: SHIPPED | OUTPATIENT
Start: 2021-07-19 | End: 2022-07-19

## 2021-07-19 RX ORDER — NITROGLYCERIN 20 MG/G
OINTMENT TOPICAL
Qty: 15 G | Refills: 1 | Status: SHIPPED | OUTPATIENT
Start: 2021-07-19 | End: 2021-07-19

## 2021-07-19 RX ORDER — SUMATRIPTAN 25 MG/1
TABLET, FILM COATED ORAL
Qty: 9 TABLET | Refills: 11 | Status: SHIPPED | OUTPATIENT
Start: 2021-07-19 | End: 2022-07-19

## 2021-07-19 RX ORDER — NITROGLYCERIN 20 MG/G
OINTMENT TOPICAL
Qty: 15 G | Refills: 1 | Status: SHIPPED | OUTPATIENT
Start: 2021-07-19 | End: 2022-07-19

## 2021-07-19 RX ORDER — CIMETIDINE 400 MG
400 TABLET ORAL AT BEDTIME
Qty: 90 TABLET | Refills: 3 | Status: SHIPPED | OUTPATIENT
Start: 2021-07-19 | End: 2022-07-19

## 2021-07-19 RX ORDER — OMEPRAZOLE 40 MG/1
40 CAPSULE, DELAYED RELEASE ORAL DAILY
Qty: 90 CAPSULE | Refills: 3 | Status: SHIPPED | OUTPATIENT
Start: 2021-07-19 | End: 2022-07-19

## 2021-07-19 ASSESSMENT — ENCOUNTER SYMPTOMS
PALPITATIONS: 0
ARTHRALGIAS: 0
CONSTIPATION: 0
HEMATOCHEZIA: 0
FREQUENCY: 0
COUGH: 0
JOINT SWELLING: 0
PALPITATIONS: 0
NERVOUS/ANXIOUS: 0
EYE PAIN: 0
SHORTNESS OF BREATH: 0
MYALGIAS: 0
NAUSEA: 0
JOINT SWELLING: 0
NAUSEA: 0
NERVOUS/ANXIOUS: 0
CHILLS: 0
DYSURIA: 0
HEARTBURN: 0
HEADACHES: 0
SHORTNESS OF BREATH: 0
MYALGIAS: 0
ABDOMINAL PAIN: 0
FEVER: 0
HEARTBURN: 0
DIARRHEA: 0
ABDOMINAL PAIN: 0
SORE THROAT: 0
FEVER: 0
CONSTIPATION: 0
HEMATURIA: 0
WEAKNESS: 0
PARESTHESIAS: 1
PARESTHESIAS: 1
HEMATURIA: 0
SORE THROAT: 0
EYE PAIN: 0
DIARRHEA: 0
FREQUENCY: 0
ARTHRALGIAS: 0
DIZZINESS: 0
BREAST MASS: 0
DYSURIA: 0
COUGH: 0
HEMATOCHEZIA: 0
HEADACHES: 0
CHILLS: 0
WEAKNESS: 0
DIZZINESS: 0
BREAST MASS: 0

## 2021-07-19 ASSESSMENT — MIFFLIN-ST. JEOR
SCORE: 1225.83
SCORE: 1243.8

## 2021-07-19 NOTE — PROGRESS NOTES
SUBJECTIVE:   CC: Amanda Sifuentes is an 56 year old woman who presents for preventive health visit.       Patient has been advised of split billing requirements and indicates understanding: Yes  Healthy Habits:     Getting at least 3 servings of Calcium per day:  Yes    Bi-annual eye exam:  Yes    Dental care twice a year:  Yes    Sleep apnea or symptoms of sleep apnea:  None    Diet:  Regular (no restrictions)    Frequency of exercise:  2-3 days/week    Duration of exercise:  45-60 minutes    Taking medications regularly:  Yes    Medication side effects:  None    PHQ-2 Total Score: 0    Additional concerns today:  No      Patient would like refill of nitro paste that says it is mixed.       Today's PHQ-2 Score:   PHQ-2 ( 1999 Pfizer) 7/19/2021   Q1: Little interest or pleasure in doing things 0   Q2: Feeling down, depressed or hopeless 0   PHQ-2 Score 0   Q1: Little interest or pleasure in doing things Not at all   Q2: Feeling down, depressed or hopeless Not at all   PHQ-2 Score 0       Abuse: Current or Past (Physical, Sexual or Emotional) - No  Do you feel safe in your environment? Yes        Social History     Tobacco Use     Smoking status: Never Smoker     Smokeless tobacco: Never Used   Substance Use Topics     Alcohol use: Yes     Comment: 3 drinks per wek      If you drink alcohol do you typically have >3 drinks per day or >7 drinks per week? No    Alcohol Use 7/19/2021   Prescreen: >3 drinks/day or >7 drinks/week? No   Prescreen: >3 drinks/day or >7 drinks/week? -       Reviewed orders with patient.  Reviewed health maintenance and updated orders accordingly - Yes  Lab work is in process    Breast Cancer Screening:  Any new diagnosis of family breast, ovarian, or bowel cancer? No    FHS-7:   Breast CA Risk Assessment (FHS-7) 7/19/2021   Did any of your first-degree relatives have breast or ovarian cancer? No   Did any of your relatives have bilateral breast cancer? No   Did any man in your family  have breast cancer? No   Did any woman in your family have breast and ovarian cancer? No   Did any woman in your family have breast cancer before age 50 y? No   Do you have 2 or more relatives with breast and/or ovarian cancer? No   Do you have 2 or more relatives with breast and/or bowel cancer? No         Pertinent mammograms are reviewed under the imaging tab.    History of abnormal Pap smear: NO - age 30- 65 PAP every 3 years recommended  PAP / HPV Latest Ref Rng & Units 4/9/2018 4/9/2018 4/9/2018   PAP - NIL NIL -   HPV 16 DNA NEG:Negative - - Negative   HPV 18 DNA NEG:Negative - - Negative   OTHER HR HPV NEG:Negative - - Negative     Reviewed and updated as needed this visit by clinical staff  Tobacco  Allergies  Meds  Problems  Med Hx  Surg Hx  Fam Hx  Soc Hx          Reviewed and updated as needed this visit by Provider  Tobacco  Allergies  Meds  Problems  Med Hx  Surg Hx  Fam Hx             Review of Systems   Constitutional: Negative for chills and fever.   HENT: Negative for congestion, ear pain, hearing loss and sore throat.    Eyes: Negative for pain and visual disturbance.   Respiratory: Negative for cough and shortness of breath.    Cardiovascular: Negative for chest pain, palpitations and peripheral edema.   Gastrointestinal: Negative for abdominal pain, constipation, diarrhea, heartburn, hematochezia and nausea.   Breasts:  Negative for tenderness, breast mass and discharge.   Genitourinary: Negative for dysuria, frequency, genital sores, hematuria, pelvic pain, urgency, vaginal bleeding and vaginal discharge.   Musculoskeletal: Negative for arthralgias, joint swelling and myalgias.   Skin: Negative for rash.   Neurological: Positive for paresthesias. Negative for dizziness, weakness and headaches.   Psychiatric/Behavioral: Negative for mood changes. The patient is not nervous/anxious.       OBJECTIVE:   BP (!) 142/88 (BP Location: Right arm, Patient Position: Chair, Cuff Size: Adult  "Regular)   Pulse 68   Temp 97.9  F (36.6  C) (Oral)   Ht 1.648 m (5' 4.88\")   Wt 64.7 kg (142 lb 9.6 oz)   LMP  (LMP Unknown)   SpO2 100%   Breastfeeding No   BMI 23.82 kg/m    Physical Exam  GENERAL: healthy, alert and no distress  EYES: Eyes grossly normal to inspection, PERRL and conjunctivae and sclerae normal  HENT: ear canals and TM's normal, nose and mouth without ulcers or lesions  NECK: no adenopathy, no asymmetry, masses, or scars and thyroid normal to palpation  RESP: lungs clear to auscultation - no rales, rhonchi or wheezes  CV: regular rate and rhythm, normal S1 S2, no S3 or S4, no murmur, click or rub, no peripheral edema and peripheral pulses strong  ABDOMEN: soft, nontender, no hepatosplenomegaly, no masses and bowel sounds normal  MS: no gross musculoskeletal defects noted, no edema  SKIN: no suspicious lesions or rashes  NEURO: Normal strength and tone, mentation intact and speech normal  PSYCH: mentation appears normal, affect normal/bright  LYMPH: no cervical, supraclavicular, axillary, or inguinal adenopathy    Diagnostic Test Results:  Labs reviewed in Epic    ASSESSMENT/PLAN:   (Z00.00) Routine general medical examination at a health care facility  (primary encounter diagnosis)  Comment: Well person   Plan: Diet, exercise, wellness and other preventive recommendations related to health maintenance were discussed.  Follow up as needed for acute issues.  Physical exam in 1 year.     (Z12.4) Screening for malignant neoplasm of cervix  Comment:   Plan: She's doing well.     (K21.9) Gastroesophageal reflux disease without esophagitis  Comment:   Plan: cimetidine (TAGAMET) 400 MG tablet, omeprazole         (PRILOSEC) 40 MG DR capsule        Refills. Stable. Recommend trial off occasionally which she agrees. No red flag symptoms     (K64.9) Hemorrhoids, unspecified hemorrhoid type  Comment:   Plan: nitroGLYcerin (NITRO-BID) 2 % OINT ointment,         DISCONTINUED: nitroGLYcerin (NITRO-BID) 2 " "%         OINT ointment        Refills given. Topical.     (L70.0) Acne vulgaris  Comment:   Plan: desonide (DESOWEN) 0.05 % external ointment        Refills given     (N95.1) Menopausal syndrome (hot flashes)  Comment:   Plan: estrogen conj (PREMARIN) 0.625 MG tablet        Stable use. Refills given. I reviewed attempts to reduce or lower dose which she says she's tried and failed to do.     (G43.809) Other migraine, not intractable, without status migrainosus  Comment:   Plan: SUMAtriptan (IMITREX) 25 MG tablet        Stable. Rare issues. Refills.       Patient has been advised of split billing requirements and indicates understanding: Yes  COUNSELING:  Reviewed preventive health counseling, as reflected in patient instructions    Estimated body mass index is 23.82 kg/m  as calculated from the following:    Height as of this encounter: 1.648 m (5' 4.88\").    Weight as of this encounter: 64.7 kg (142 lb 9.6 oz).        She reports that she has never smoked. She has never used smokeless tobacco.      Counseling Resources:  ATP IV Guidelines  Pooled Cohorts Equation Calculator  Breast Cancer Risk Calculator  BRCA-Related Cancer Risk Assessment: FHS-7 Tool  FRAX Risk Assessment  ICSI Preventive Guidelines  Dietary Guidelines for Americans, 2010  USDA's MyPlate  ASA Prophylaxis  Lung CA Screening    BRYANT HIGGINS PA-C  [facility]  "

## 2021-07-19 NOTE — PATIENT INSTRUCTIONS
The 10-year ASCVD risk score (Honey SILVA Jr., et al., 2013) is: 3.3%    Values used to calculate the score:      Age: 56 years      Sex: Female      Is Non- : No      Diabetic: No      Tobacco smoker: No      Systolic Blood Pressure: 142 mmHg      Is BP treated: No      HDL Cholesterol: 76 mg/dL      Total Cholesterol: 265 mg/dL     Preventive Health Recommendations  Female Ages 50 - 64    Yearly exam: See your health care provider every year in order to  o Review health changes.   o Discuss preventive care.    o Review your medicines if your doctor has prescribed any.      Get a Pap test every three years (unless you have an abnormal result and your provider advises testing more often).    If you get Pap tests with HPV test, you only need to test every 5 years, unless you have an abnormal result.     You do not need a Pap test if your uterus was removed (hysterectomy) and you have not had cancer.    You should be tested each year for STDs (sexually transmitted diseases) if you're at risk.     Have a mammogram every 1 to 2 years.    Have a colonoscopy at age 50, or have a yearly FIT test (stool test). These exams screen for colon cancer.      Have a cholesterol test every 5 years, or more often if advised.    Have a diabetes test (fasting glucose) every three years. If you are at risk for diabetes, you should have this test more often.     If you are at risk for osteoporosis (brittle bone disease), think about having a bone density scan (DEXA).    Shots: Get a flu shot each year. Get a tetanus shot every 10 years.    Nutrition:     Eat at least 5 servings of fruits and vegetables each day.    Eat whole-grain bread, whole-wheat pasta and brown rice instead of white grains and rice.    Get adequate Calcium and Vitamin D.     Lifestyle    Exercise at least 150 minutes a week (30 minutes a day, 5 days a week). This will help you control your weight and prevent disease.    Limit alcohol to one  drink per day.    No smoking.     Wear sunscreen to prevent skin cancer.     See your dentist every six months for an exam and cleaning.    See your eye doctor every 1 to 2 years.

## 2021-07-19 NOTE — PROGRESS NOTES
OB/GYN New       NAME:  Amanda Sifuentes  PCP:  Otilio Vega  MRN:  [id number]      Impression / Plan     56 year old  with:      ICD-10-CM    1. Encounter for screening for cervical cancer   Z12.4 Pap imaged thin layer screen with HPV - recommended age 30 - 65 years     Pap imaged thin layer screen with HPV - recommended age 30 - 65 years   2. 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  She had her preventative health visit today with her family medicine provider.  She presents for cervical cancer screening.    No LMP recorded (lmp unknown). Patient has had a hysterectomy.   Patient had a supracervical total abdominal hysterectomy and bilateral salpingo-oophorectomy for heavy bleeding, benign.  Patient had uterine didelphys and two cervixes remain.  She also has a vaginal septum.  She has been told that it is easier to access her left cervix from the right and angling towards the left.  She has no vaginal bleeding, no abnormal discharge.  No pelvic pain.  No other concerns today.    Problem List     Patient Active Problem List    Diagnosis Date Noted     Other type of migraine 2004     Priority: High     Diagnosis updated by automated process. Provider to review and confirm.       Esophageal reflux 2004     Priority: High     Thyroid nodule 2020     Priority: Medium     Cervical cancer screening      Priority: Medium     Date  Cervix A   Cervix B  12 NIL pap, neg HR HPV.  NIL pap, neg HR HPV.  2/9/15  NIL pap, neg HR HPV.  NIL pap, neg HR HPV. Plan: cotest in 5 years  18  NIL pap, neg HR HPV.  NIL pap, neg HR HPV. Plan: pap in 3 years.       Thyroid with heterogeneous echotexture determined by ultrasound 2016     Priority: Medium     Thyroid cyst 2016      Priority: Medium     History of irradiation, presenting hazards to health 11/22/2016     Priority: Medium     Double cervix 10/31/2016     Priority: Medium     Porokeratosis 02/10/2015     Priority: Medium     Actinic keratosis 02/09/2015     Priority: Medium     Hormone replacement therapy (postmenopausal) 12/30/2012     Priority: Medium     Hyperlipidemia LDL goal <160 12/27/2012     Priority: Medium     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     Priority: Medium     Solar elastosis 07/07/2012     Priority: Medium     AK (actinic keratosis) 07/07/2012     Priority: Medium     History of hysterectomy for benign disease 12/27/2011     Priority: Medium     She had a supra-cervical hysterectomy of a bicornate uterus        Eczema 12/27/2011     Priority: Medium     Family history of colon cancer 12/29/2010     Priority: Medium     Grandparents (2) ages late 60's, and early 70's       CARDIOVASCULAR SCREENING; LDL GOAL LESS THAN 160 05/09/2010     Priority: Medium     Other acne 12/07/2007     Priority: Medium     Hemorrhoids 11/23/2004     Priority: Medium     Problem list name updated by automated process. Provider to review         Medications     Current Outpatient Medications   Medication     ascorbic acid (VITAMIN C) 1000 MG TABS     Calcium Carb-Cholecalciferol 600-800 MG-UNIT TABS     cimetidine (TAGAMET) 400 MG tablet     desonide (DESOWEN) 0.05 % external ointment     estrogen conj (PREMARIN) 0.625 MG tablet     GLUCOSAMINE CHONDROITIN OR TABS     MULTI-VITAMIN OR TABS     nitroGLYcerin (NITRO-BID) 2 % OINT ointment     olopatadine (PATANOL) 0.1 % ophthalmic solution     Omega-3 Fatty Acids (FISH OIL) 1200 MG capsule     omeprazole (PRILOSEC) 40 MG DR capsule     SUMAtriptan (IMITREX) 25 MG tablet     vitamin  B complex with vitamin C (VITAMIN  B COMPLEX) TABS     vitamin E 400 UNIT capsule     benzoyl peroxide 5 % topical gel     No current facility-administered medications for this visit.        Allergies      No Known Allergies    Past Medical/Surgical History     Past Medical History:   Diagnosis Date     Esophageal reflux      Family history of colon cancer      Globus sensation      Multiple thyroid nodules     2009, 2013 FNAB benign     Other forms of migraine, without mention of intractable migraine without mention of status migrainosus      Unspecified hemorrhoids without mention of complication        Past Surgical History:   Procedure Laterality Date     COLONOSCOPY  5/20/2013    Procedure: COLONOSCOPY;;  Surgeon: Jim Flores MD;  Location: [facility]     COLONOSCOPY N/A 1/11/2019    Procedure: COLONOSCOPY;  Surgeon: Luisito Barnard MD;  Location: [facility]     HC EXCISION BREAST LESION, OPEN >=1      benign     HEMORRHOID SURGERY       HYSTERECTOMY, PAP STILL INDICATED      benign/with both ovaries(cervix in)        Social History     Social History     Socioeconomic History     Marital status:      Spouse name: Jose David     Number of children: 0     Years of education: Not on file     Highest education level: Not on file   Occupational History     Occupation: clinical studies     Employer: [employer]     Comment: Urinary Incontinence Study     Employer: [employer]   Tobacco Use     Smoking status: Never Smoker     Smokeless tobacco: Never Used   Vaping Use     Vaping Use: Never used   Substance and Sexual Activity     Alcohol use: Yes     Comment: 3 drinks per wek      Drug use: No     Sexual activity: Yes     Partners: Male     Birth control/protection: Surgical     Comment: hysterectomy   Other Topics Concern     Parent/sibling w/ CABG, MI or angioplasty before 65F 55M? No   Social History Narrative    Dairy/d 2 servings/d.     Caffeine 2 servings/d    Exercise 3-4 x week, cardio and weights    Sunscreen used - Yes    Seatbelts used - Yes    Working smoke/CO detectors in the home - Yes    Guns stored in the home - Yes    Self Breast Exams - Yes    Self Testicular Exam - NA    Eye Exam up to date  - Yes    Dental Exam up to date - Yes    Pap Smear up to date - Yes, 9/11/07 at [facility]    Mammogram up to date - Yes, 9/11/07 [place] [facility]    PSA up to date - NA    Dexa Scan up to date - NA    Flex Sig / Colonoscopy up to date - Yes, 2004    Immunizations up to date - Yes, td 2006    Abuse: Current or Past(Physical, Sexual or Emotional)- No    Do you feel safe in your environment - Yes    Ashley BarretoSABRINA wilder    12/7/07     Social Determinants of Health     Financial Resource Strain:      Difficulty of Paying Living Expenses:    Food Insecurity:      Worried About Running Out of Food in the Last Year:      Ran Out of Food in the Last Year:    Transportation Needs:      Lack of Transportation (Medical):      Lack of Transportation (Non-Medical):    Physical Activity:      Days of Exercise per Week:      Minutes of Exercise per Session:    Stress:      Feeling of Stress :    Social Connections:      Frequency of Communication with Friends and Family:      Frequency of Social Gatherings with Friends and Family:      Attends Confucianism Services:      Active Member of Clubs or Organizations:      Attends Club or Organization Meetings:      Marital Status:    Intimate Partner Violence:      Fear of Current or Ex-Partner:      Emotionally Abused:      Physically Abused:      Sexually Abused:        Family History      Family History   Problem Relation Age of Onset     Diabetes Maternal Grandfather      Cancer - colorectal Maternal Grandfather         70s     Hypertension Father      Prostate Cancer Father      Cancer Father         Basal Cell Carcinoma     Cerebrovascular Disease Maternal Grandmother      Cancer - colorectal Maternal Grandmother         70s     Cardiovascular Mother         MI in her 70's(4 stents)     Depression Mother        ROS     A gu organ review of systems was asked and the pertinent positives and negatives are listed in the HPI.     Physical Exam   Vitals: BP (!) 151/88 (BP Location: Right  "arm, Cuff Size: Adult Regular)   Pulse 66   Ht 1.664 m (5' 5.5\")   Wt 65.5 kg (144 lb 6.4 oz)   LMP  (LMP Unknown)   SpO2 100%   BMI 23.66 kg/m      General: Comfortable, no obvious distress,   : Normal female external genitalia.  No lesions. Urethral meatus normal.  Speculum exam reveals a  vaginal vault with no lesions, normal cervix x2.  No lesions.  No abnormal discharge.  Bimanual exam reveals   no palpable masses, nontender.  Longitudinal vaginal septum appreciated.    Pap was done x2      20 min spent on the date of the encounter in chart review, patient visit, review of tests, documentation and/or discussion with other providers about the issues documented above.     Smiley Cuevas MD       "

## 2021-07-21 LAB
BKR LAB AP GYN ADEQUACY: NORMAL
BKR LAB AP GYN ADEQUACY: NORMAL
BKR LAB AP GYN INTERPRETATION: NORMAL
BKR LAB AP GYN INTERPRETATION: NORMAL
BKR LAB AP HPV REFLEX: NORMAL
BKR LAB AP HPV REFLEX: NORMAL
BKR LAB AP PREVIOUS ABNORMAL: NORMAL
BKR LAB AP PREVIOUS ABNORMAL: NORMAL
PATH REPORT.COMMENTS IMP SPEC: NORMAL
PATH REPORT.COMMENTS IMP SPEC: NORMAL
PATH REPORT.RELEVANT HX SPEC: NORMAL
PATH REPORT.RELEVANT HX SPEC: NORMAL

## 2021-07-22 ENCOUNTER — ALLIED HEALTH/NURSE VISIT (OUTPATIENT)
Dept: NURSING | Facility: CLINIC | Age: 56
End: 2021-07-22
Payer: COMMERCIAL

## 2021-07-22 VITALS — TEMPERATURE: 98.7 F

## 2021-07-22 DIAGNOSIS — Z23 NEED FOR SHINGLES VACCINE: Primary | ICD-10-CM

## 2021-07-22 PROCEDURE — 90750 HZV VACC RECOMBINANT IM: CPT

## 2021-07-22 PROCEDURE — 90471 IMMUNIZATION ADMIN: CPT

## 2021-07-22 NOTE — PROGRESS NOTES
1.  Has the patient received the information for the Zostavax vaccine? YES    2.  Does the patient have any of the following contraindications?     Allergy to Neomycin or Gelatin? No       Current moderate or severe illness? No  Temp = 98.7  If temp > 99.0 degrees orally or patient has above allergies, vaccine is deferred    3.  The vaccine has been administered in the usual fashion and the patient was instructed to wait 15 minutes before leaving the building in the event of an allergic reaction: YES    Vaccination given by Delma Jeter CMA ([facility]).  Recorded by Delma Jeter CMA    Prior to immunization administration, verified patients identity using patient s name and date of birth. Please see Immunization Activity for additional information.     Screening Questionnaire for Adult Immunization    Are you sick today?   No   Do you have allergies to medications, food, a vaccine component or latex?   No   Have you ever had a serious reaction after receiving a vaccination?   No   Do you have a long-term health problem with heart, lung, kidney, or metabolic disease (e.g., diabetes), asthma, a blood disorder, no spleen, complement component deficiency, a cochlear implant, or a spinal fluid leak?  Are you on long-term aspirin therapy?   No   Do you have cancer, leukemia, HIV/AIDS, or any other immune system problem?   No   Do you have a parent, brother, or sister with an immune system problem?   No   In the past 3 months, have you taken medications that affect  your immune system, such as prednisone, other steroids, or anticancer drugs; drugs for the treatment of rheumatoid arthritis, Crohn s disease, or psoriasis; or have you had radiation treatments?   No   Have you had a seizure, or a brain or other nervous system problem?   No   During the past year, have you received a transfusion of blood or blood    products, or been given immune (gamma) globulin or antiviral drug?   No   For women: Are you pregnant or is  there a chance you could become       pregnant during the next month?   No   Have you received any vaccinations in the past 4 weeks?   No     Immunization questionnaire answers were all negative.         Patient instructed to remain in clinic for 15 minutes afterwards, and to report any adverse reaction to me immediately.       Screening performed by Delma Jeter CMA on 7/22/2021 at 10:39 AM.

## 2021-07-27 LAB
HUMAN PAPILLOMA VIRUS 16 DNA: NEGATIVE
HUMAN PAPILLOMA VIRUS 16 DNA: NEGATIVE
HUMAN PAPILLOMA VIRUS 18 DNA: NEGATIVE
HUMAN PAPILLOMA VIRUS 18 DNA: NEGATIVE
HUMAN PAPILLOMA VIRUS FINAL DIAGNOSIS: NORMAL
HUMAN PAPILLOMA VIRUS FINAL DIAGNOSIS: NORMAL
HUMAN PAPILLOMA VIRUS OTHER HR: NEGATIVE
HUMAN PAPILLOMA VIRUS OTHER HR: NEGATIVE

## 2021-09-19 ENCOUNTER — HEALTH MAINTENANCE LETTER (OUTPATIENT)
Age: 56
End: 2021-09-19

## 2021-10-25 ENCOUNTER — MYC MEDICAL ADVICE (OUTPATIENT)
Dept: FAMILY MEDICINE | Facility: CLINIC | Age: 56
End: 2021-10-25

## 2021-10-25 NOTE — TELEPHONE ENCOUNTER
Zay Posey,     I did look under your immunization tab and see these documented vaccinations.  I did print out a copy as well, would you like me to e-mail it to you?     Thanks ,  Jennifer Marley/  [place]

## 2022-02-02 ENCOUNTER — TELEPHONE (OUTPATIENT)
Dept: FAMILY MEDICINE | Facility: CLINIC | Age: 57
End: 2022-02-02
Payer: COMMERCIAL

## 2022-02-02 NOTE — TELEPHONE ENCOUNTER
Prior Authorization Retail Medication Request    Medication/Dose: estrogen conj (PREMARIN) 0.625 MG tablet  ICD code (if different than what is on RX):  na  Previously Tried and Failed:  na  Rationale:  na    Insurance Name:  na  Insurance ID:  na      Pharmacy Information (if different than what is on RX)  Name:  same  Phone:  same    [id number] Comments:  Alternative Requested:  Please, start a new PA since insurance does not want to cover.

## 2022-02-03 ENCOUNTER — MYC MEDICAL ADVICE (OUTPATIENT)
Dept: DERMATOLOGY | Facility: CLINIC | Age: 57
End: 2022-02-03
Payer: COMMERCIAL

## 2022-02-03 DIAGNOSIS — Z78.0 MENOPAUSE: Primary | ICD-10-CM
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
## 2022-02-09 NOTE — TELEPHONE ENCOUNTER
PA Initiation    Medication: estrogen conj (PREMARIN) 0.625 MG tablet   Insurance Company: [facility] [employer] - Phone [phone] Fax [phone]  Pharm[facility]illing [facility] [id number] [place] [facility] [place] - [address]  Filling Pharmacy Phone: [phone]  Filling Pharmacy Fax: [phone]  Start Date: 2/8/2022

## 2022-02-09 NOTE — TELEPHONE ENCOUNTER
Prior Authorization Approval    Authorization Effective Date: 2/8/2022  Authorization Expiration Date: 2/8/2023  Medication: estrogen conj (PREMARIN) 0.625 MG tablet--APPROVED  Approved Dose/Quantity:   Reference #:     Insurance Company: [employer] - Phone [phone] Fax [phone]  Expected CoPay:       CoPay Card Available:      Foundation Assistance Needed:    Which Pharmacy is filling the prescription (Not needed for infusion/clinic administered): [facility] [id number] [place] [facility] [place] [address]  Pharmacy Notified: Yes  Patient Notified: Yes **Instructed pharmacy to notify patient when script is ready to /ship.**

## 2022-02-17 RX ORDER — ESTRADIOL 1 MG/1
1 TABLET ORAL DAILY
Qty: 60 TABLET | Refills: 0 | Status: SHIPPED | OUTPATIENT
Start: 2022-02-17 | End: 2022-04-15

## 2022-02-17 NOTE — TELEPHONE ENCOUNTER
Spoke with pt and relayed Dr. Cuevas's message below.  Pt states she is due for a yearly in July and is wondering if she can discuss the estradiol at that time with Dr. Cuevas.  I suggested that since Dr. Cuevas only prescribed a 60 day supply with 0 refills it is best to schedule another appointment within the next 2 months to discuss the estradiol use before she would need another refill.    Pt will call back to schedule.  Gave phone number to schedule with Dr. Cuevas.    Mare Mccall RN

## 2022-02-17 NOTE — TELEPHONE ENCOUNTER
I will give her a small prescription, but she needs to make an appointment to discuss this.  This may be a telephone or video visit if that is preferred.  I have not seen her for this in the past.  It was previously managed by her PCP, Otilio Vega.  I am not sure how far into menopause she is.  I previously saw her for Pap smears.  She also has elevated blood pressure and a LDL of 162, so oral  estrogen may no longer be appropriate.  Premarin 0.625 = estrace 1 mg.

## 2022-02-17 NOTE — TELEPHONE ENCOUNTER
Routing Feniks message to MIKE Ramirez RN    Triage Nurse  [facility]  Appointment line: [phone]  [facility], 24 hour nurse line, available by calling clinic at [phone] and following prompts.

## 2022-02-17 NOTE — TELEPHONE ENCOUNTER
She has seen obgyn 7/21  They should manage this as I have not seen her for this concern.   Please forward to her obgyn to manage this as it is gyn concern  Thanks    Noreen Ventura D.O.

## 2022-02-17 NOTE — TELEPHONE ENCOUNTER
Can prescription for Premarin be changed to Estradiol?    Patient states that she currently has a prescription for Premarin .625 that was prescribed by Dr. Vega who is no longer with [facility]. Her insurance will no longer cover Premarin, but they will cover Estradiol. Patient states that she has seen Dr. Ventura previously and is hoping that the prescription can be changed without a visit as she has been travelling frequently for work and is now completely out of Premarin.  If appropriate, patient would like medication sent to preferred pharmacy.     Emma Portillo RN   [facility]

## 2022-04-11 DIAGNOSIS — Z78.0 MENOPAUSE: ICD-10-CM

## 2022-04-15 RX ORDER — ESTRADIOL 1 MG/1
1 TABLET ORAL DAILY
Qty: 30 TABLET | Refills: 1 | Status: SHIPPED | OUTPATIENT
Start: 2022-04-15 | End: 2022-04-18

## 2022-04-15 NOTE — TELEPHONE ENCOUNTER
"Requested Prescriptions   Pending Prescriptions Disp Refills     estradiol (ESTRACE) 1 MG tablet [Pharmacy Med Name: ESTRADIOL 1 MG TABLET] 90 tablet 1     Sig: TAKE 1 TABLET BY MOUTH EVERY DAY       Hormone Replacement Therapy Failed - 4/11/2022  8:30 AM        Failed - Blood pressure under 140/90 in past 12 months     BP Readings from Last 3 Encounters:   07/19/21 (!) 151/88   07/19/21 (!) 142/88   04/19/21 128/86                 Passed - Recent (12 mo) or future (30 days) visit within the authorizing provider's specialty     Patient has had an office visit with the authorizing provider or a provider within the authorizing providers department within the previous 12 mos or has a future within next 30 days. See \"Patient Info\" tab in inbasket, or \"Choose Columns\" in Meds & Orders section of the refill encounter.              Passed - Patient has mammogram in past 2 years on file if age 50-75        Passed - Medication is active on med list        Passed - Patient is 18 years of age or older        Passed - No active pregnancy on record        Passed - No positive pregnancy test on record in past 12 months           Pt last seen 7/19/2021 for annual    Last prescribed 2/17/2022 for 60 tablets with 0 refills    RN routing to provider to advise on 90 day supply as requested     Jenniffer Genao RN on 4/15/2022 at 2:28 PM      "

## 2022-04-18 ENCOUNTER — OFFICE VISIT (OUTPATIENT)
Dept: OBGYN | Facility: CLINIC | Age: 57
End: 2022-04-18
Payer: COMMERCIAL

## 2022-04-18 VITALS
WEIGHT: 144 LBS | SYSTOLIC BLOOD PRESSURE: 148 MMHG | HEART RATE: 68 BPM | BODY MASS INDEX: 23.14 KG/M2 | DIASTOLIC BLOOD PRESSURE: 95 MMHG | HEIGHT: 66 IN | OXYGEN SATURATION: 100 %

## 2022-04-18 DIAGNOSIS — Z78.0 MENOPAUSE: Primary | ICD-10-CM

## 2022-04-18 DIAGNOSIS — Z01.419 GYNECOLOGIC EXAM NORMAL: ICD-10-CM

## 2022-04-18 PROCEDURE — 99214 OFFICE O/P EST MOD 30 MIN: CPT | Performed by: OBSTETRICS & GYNECOLOGY

## 2022-04-18 RX ORDER — ESTRADIOL 0.5 MG/1
0.5 TABLET ORAL DAILY
Qty: 90 TABLET | Refills: 3 | Status: SHIPPED | OUTPATIENT
Start: 2022-04-18 | End: 2023-04-17

## 2022-04-18 RX ORDER — GABAPENTIN 100 MG/1
CAPSULE ORAL
Qty: 63 CAPSULE | Refills: 0 | Status: SHIPPED | OUTPATIENT
Start: 2022-04-18 | End: 2022-07-19

## 2022-04-18 NOTE — PROGRESS NOTES
OB/GYN      NAME:  Amanda Sifuentes  PCP:  No Ref-Primary, Physician  MRN:  [id number]    Impression / Plan     57 year old  with:      ICD-10-CM    1. Menopause  Z78.0 estradiol (ESTRACE) 0.5 MG tablet     gabapentin (NEURONTIN) 100 MG capsule   2. Gynecologic exam normal  Z01.419        Normal breast and pelvic exam today. Plan to follow up with her PCP for routine health maintenance exam and evaluation of elevated BPs.  She will continue to monitor her BPs at home, which have been normal.     Discussed management of hot flashes and night sweats.  She has been using estradiol tablets 1 mg and we will plan to decrease the dose to 0.5 mg daily.  See avs.  She will follow up in one year and we will see about discontinuing the oral ERT at that time.  Recommends she is on the lowest effective dose for the shortest duration. Risks and benefits discussed  Recommend gabapentin for management of night sweats. She will titrate that up to 300 mg and let me know how she is doing in about 3 weeks via [employer].  I will send in refills at that time based on her response to that medication.       Chief Complaint     Chief Complaint   Patient presents with     Gyn Exam     Breast Exam       HPI     Amanda Sifuentes is a  57 year old female who is seen for estradiol.     No LMP recorded (lmp unknown). Patient has had a hysterectomy. Supracervical    Night sweats - waking her up most nights per week.  Usually a couple times per night when it happens.  Hot flashes are not as bothersome, occur about once per day.     No other menopause symptoms.     No history of HTN, however BPs have been slightly elevated.  Not as high at home. Usually about 120/72.    No history of cholesterol problem. Last lipid panel was last year.  Total cholesterol 265, HDL 76    Did not tolerate the patch in the past.      Problem List     Patient Active Problem List    Diagnosis Date Noted     Other type of migraine 2004     Priority:  High     Diagnosis updated by automated process. Provider to review and confirm.       Esophageal reflux 11/23/2004     Priority: High     Thyroid nodule 08/06/2020     Priority: Medium     Cervical cancer screening      Priority: Medium     Date  Cervix A  Cervix B  12/27/12 NIL pap, neg HR HPV // NIL pap, neg HR HPV.  2/9/15  NIL pap, neg HR HPV. //  NIL pap, neg HR HPV. Plan: cotest in 5 years  4/9/18 NIL pap, neg HR HPV //  NIL pap, neg HR HPV. Plan: pap in 3 years.  7/15/21 NIL pap, neg HR HPV //  NIL pap, neg HR HPV. Plan: Cotest in 5 years.       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     Priority: Medium     Solar elastosis 07/07/2012     Priority: Medium     AK (actinic keratosis) 07/07/2012     Priority: Medium     History of hysterectomy for benign disease 12/27/2011     Priority: Medium     She had a supra-cervical hysterectomy of a bicornate uterus        Eczema 12/27/2011     Priority: Medium     Family history of colon cancer 12/29/2010     Priority: Medium     Grandparents (2) ages late 60's, and early 70's       CARDIOVASCULAR SCREENING; LDL GOAL LESS THAN 160 05/09/2010     Priority: Medium     Other acne 12/07/2007     Priority: Medium     Hemorrhoids 11/23/2004     Priority: Medium     Problem list name updated by automated process. Provider to review         Medications     Current Outpatient Medications   Medication     ascorbic acid (VITAMIN C) 1000 MG TABS     Calcium  "Carb-Cholecalciferol 600-800 MG-UNIT TABS     cimetidine (TAGAMET) 400 MG tablet     desonide (DESOWEN) 0.05 % external ointment     estradiol (ESTRACE) 0.5 MG tablet     gabapentin (NEURONTIN) 100 MG capsule     GLUCOSAMINE CHONDROITIN OR TABS     MULTI-VITAMIN OR TABS     nitroGLYcerin (NITRO-BID) 2 % OINT ointment     olopatadine (PATANOL) 0.1 % ophthalmic solution     omega-3 fatty acids 1200 MG capsule     omeprazole (PRILOSEC) 40 MG DR capsule     SUMAtriptan (IMITREX) 25 MG tablet     vitamin B complex with vitamin C (STRESS TAB) tablet     vitamin E 400 UNIT capsule     benzoyl peroxide 5 % topical gel     No current facility-administered medications for this visit.        Allergies     No Known Allergies    ROS     Pertinent positives and negatives are listed in the HPI.     Physical Exam   Vitals: BP (!) 148/95 (BP Location: Right arm, Cuff Size: Adult Regular)   Pulse 68   Ht 1.664 m (5' 5.5\")   Wt 65.3 kg (144 lb)   LMP  (LMP Unknown)   SpO2 100%   BMI 23.60 kg/m      General: Comfortable, no obvious distress  Psych: Alert and orientated x 3. Appropriate affect, good insight.   : Normal female external genitalia.  No lesions.  Urethral meatus normal.  Speculum exam reveals a normal vaginal vault x2, normal cervix x2.  Longitudinal septum appreciated. No abnormal discharge.  Bimanual exam reveals no palpable masses.           30 min spent on the date of the encounter in chart review, patient visit, review of tests, documentation  about the issues documented above.     Smiley Cuevas MD     "

## 2022-04-18 NOTE — PATIENT INSTRUCTIONS
Plan to use gabapentin 100 mg (one tab) by mouth at night for one week.  If you tolerate it well but it is not helping the night sweats, then increase to 200 mg nightly for one week.  If not affective, you can increase to 300 mg nightly.    Reach out through [email or web address] to let me know what dose is effect and then I will send another prescription.     I have also sent in a new prescription for the estradiol.  You my alternate this prescription with you old presciption until you run out of the 1 mg tabs.  For example:  0.5 mg tab Tuesday, 1 gm Wednesday, 0.5 mg Thurs, etc).                   If you have labs or imaging done, the results will automatically release in [employer] without an interpretation.  Your health care professional will review those results and send an interpretation with recommendations as soon as possible, but this may be 1-3 business days.    If you have any questions regarding your visit, please contact your care team.     [employer] Access Services: [phone]  [facility] s Health CLINIC HOURS TELEPHONE NUMBER       MD Laureen Reyes - Certified Medical Assistant     Jenniffer-CHELSEY Garvey-RN  Shanell-CHELSEY Nguyễn-  Dariela-     Monday- [place]  8:00 a.m - 5:00 p.m    Tuesday- Surgery    Wednesday- Admin    Thursday- [place]  8:00 a.m - 5:00 p.m.    Friday- [place]  7:30 a.m - 4:00 p.m. [facility]  [address]  [place] [address]  [phone] Fax  Imaging Scheduling [phone]    [facility]  [address] [place] [address]  [phone]    [facility]  [address]  [place] [address]  [phone] Fax  Imaging Scheduling [phone]     Urgent Care locations:  [facility] Monday-Friday  10 am - 8 pm  Saturday and Sunday   9 am - 5 pm  Monday-Friday   10 am - 8 pm  Saturday and Sunday   9 am - 5 pm   [phone]      If you need a medication refill, please contact your pharmacy. Please allow 3 business days for your refill to be completed.    As always, thank you for trusting us with your healthcare needs!

## 2022-06-29 ENCOUNTER — DOCUMENTATION ONLY (OUTPATIENT)
Dept: LAB | Facility: CLINIC | Age: 57
End: 2022-06-29

## 2022-06-29 DIAGNOSIS — N95.1 MENOPAUSAL SYNDROME (HOT FLASHES): ICD-10-CM

## 2022-06-29 DIAGNOSIS — Z13.29 SCREENING FOR THYROID DISORDER: ICD-10-CM

## 2022-06-29 DIAGNOSIS — Z13.1 SCREENING FOR DIABETES MELLITUS: ICD-10-CM

## 2022-06-29 DIAGNOSIS — Z13.220 SCREENING CHOLESTEROL LEVEL: Primary | ICD-10-CM

## 2022-06-29 DIAGNOSIS — Z13.220 SCREENING FOR LIPOID DISORDERS: ICD-10-CM

## 2022-06-29 NOTE — PROGRESS NOTES
This patient has a future lab only appointment and needs orders. Please send orders. Thanks cameron

## 2022-07-11 ENCOUNTER — TELEPHONE (OUTPATIENT)
Dept: FAMILY MEDICINE | Facility: CLINIC | Age: 57
End: 2022-07-11

## 2022-07-11 DIAGNOSIS — Z11.1 SCREENING EXAMINATION FOR PULMONARY TUBERCULOSIS: Primary | ICD-10-CM

## 2022-07-11 NOTE — TELEPHONE ENCOUNTER
Reason for Call: Request for an order or referral:    Order or referral being requested: gold TB blood test    Date needed: as soon as possible    Additional comments: Patient has lab appointment scheduled for this Thursday, July 14th at 7:30 a.m.  Patient had discussed with Dr. Ventura about getting a gold TB blood test.  Patient works for [employer] and this is required for her job.  Please place order.    Phone number Patient can be reached at:  Home number on file [phone] (home)    Best Time:  anytime    Can we leave a detailed message on this number?  YES    Call taken on 7/11/2022 at 4:40 PM by Triny Molina

## 2022-07-11 NOTE — TELEPHONE ENCOUNTER
Routing to Dr. Ventura - patient has appt to establish care 7/19. Has labs scheduled 7/14 - is requesting quantiferon gold test. Works at [employer], needs for employer.     Willing to sign order?     Елена Marlow, RN, BSN, PHN  [facility]: [place]

## 2022-07-12 NOTE — TELEPHONE ENCOUNTER
She has not talked to me about this as I have not seen her yet, she was seeing Gary prior  I can sign order though. signed  Noreen Ventura D.O.

## 2022-07-14 ENCOUNTER — LAB (OUTPATIENT)
Dept: LAB | Facility: CLINIC | Age: 57
End: 2022-07-14
Payer: COMMERCIAL

## 2022-07-14 DIAGNOSIS — Z11.1 SCREENING EXAMINATION FOR PULMONARY TUBERCULOSIS: ICD-10-CM

## 2022-07-14 DIAGNOSIS — N95.1 MENOPAUSAL SYNDROME (HOT FLASHES): ICD-10-CM

## 2022-07-14 DIAGNOSIS — Z13.220 SCREENING FOR LIPOID DISORDERS: ICD-10-CM

## 2022-07-14 DIAGNOSIS — Z13.29 SCREENING FOR THYROID DISORDER: ICD-10-CM

## 2022-07-14 LAB
BASOPHILS # BLD AUTO: 0 10E3/UL (ref 0–0.2)
BASOPHILS NFR BLD AUTO: 1 %
EOSINOPHIL # BLD AUTO: 0.1 10E3/UL (ref 0–0.7)
EOSINOPHIL NFR BLD AUTO: 3 %
ERYTHROCYTE [DISTWIDTH] IN BLOOD BY AUTOMATED COUNT: 12.4 % (ref 10–15)
HCT VFR BLD AUTO: 39.9 % (ref 35–47)
HGB BLD-MCNC: 13.6 G/DL (ref 11.7–15.7)
LYMPHOCYTES # BLD AUTO: 1.4 10E3/UL (ref 0.8–5.3)
LYMPHOCYTES NFR BLD AUTO: 38 %
MCH RBC QN AUTO: 31 PG (ref 26.5–33)
MCHC RBC AUTO-ENTMCNC: 34.1 G/DL (ref 31.5–36.5)
MCV RBC AUTO: 91 FL (ref 78–100)
MONOCYTES # BLD AUTO: 0.6 10E3/UL (ref 0–1.3)
MONOCYTES NFR BLD AUTO: 15 %
NEUTROPHILS # BLD AUTO: 1.6 10E3/UL (ref 1.6–8.3)
NEUTROPHILS NFR BLD AUTO: 43 %
PLATELET # BLD AUTO: 271 10E3/UL (ref 150–450)
RBC # BLD AUTO: 4.39 10E6/UL (ref 3.8–5.2)
WBC # BLD AUTO: 3.8 10E3/UL (ref 4–11)

## 2022-07-14 PROCEDURE — 85025 COMPLETE CBC W/AUTO DIFF WBC: CPT

## 2022-07-14 PROCEDURE — 80048 BASIC METABOLIC PNL TOTAL CA: CPT

## 2022-07-14 PROCEDURE — 86481 TB AG RESPONSE T-CELL SUSP: CPT

## 2022-07-14 PROCEDURE — 84443 ASSAY THYROID STIM HORMONE: CPT

## 2022-07-14 PROCEDURE — 36415 COLL VENOUS BLD VENIPUNCTURE: CPT

## 2022-07-14 PROCEDURE — 80061 LIPID PANEL: CPT

## 2022-07-14 NOTE — LETTER
"July 21, 2022      Amanda Sifuentes  [address] [place] [id number]        Dear Amanda,     Your cholesterol is abnormal, please use the recommendations below and recheck labs in 6-12 months.     Ways to improve your cholesterol...     1- Eats less saturated fats (including avoiding \"trans\" fats).     2 - Eat more unsaturated fats  - found in vegetables, grains, and tree nuts.   Also by replacing butter with canola oil or olive oil.     3 - Eat more nuts. 1-2 ounces (a small handful) of almonds, walnuts, hazelnuts or pecans once a day in place of other less healthy snacks.     4 - Eat more high fiber foods - vegetables and whole grains including oat bran, oats, beans, peas, and flax seed.     5 - Eat more fish - such as salmon, tuna, mackerel, and sardines.  1 or 2 six ounce servings per week is a healthy replacement for other proteins.     6 - Exercise for at least 120 minutes per week - which is equal to 30 minutes 4 days per week.         Sincerely,        Noreen Ventura DO    Results for orders placed or performed in visit on 07/14/22   Lipid panel reflex to direct LDL Fasting     Status: Abnormal   Result Value Ref Range    Cholesterol 268 (H) <200 mg/dL    Triglycerides 74 <150 mg/dL    Direct Measure HDL 75 >=50 mg/dL    LDL Cholesterol Calculated 178 (H) <=100 mg/dL    Non HDL Cholesterol 193 (H) <130 mg/dL    Patient Fasting > 8hrs? Yes     Narrative    Cholesterol  Desirable:  <200 mg/dL    Triglycerides  Normal:  Less than 150 mg/dL  Borderline High:  150-199 mg/dL  High:  200-499 mg/dL  Very High:  Greater than or equal to 500 mg/dL    Direct Measure HDL  Female:  Greater than or equal to 50 mg/dL   Male:  Greater than or equal to 40 mg/dL    LDL Cholesterol  Desirable:  <100mg/dL  Above Desirable:  100-129 mg/dL   Borderline High:  130-159 mg/dL   High:  160-189 mg/dL   Very High:  >= 190 mg/dL    Non HDL Cholesterol  Desirable:  130 mg/dL  Above Desirable:  130-159 mg/dL  Borderline High:  " 160-189 mg/dL  High:  190-219 mg/dL  Very High:  Greater than or equal to 220 mg/dL   Basic metabolic panel  (Ca, Cl, CO2, Creat, Gluc, K, Na, BUN)     Status: Normal   Result Value Ref Range    Sodium 141 133 - 144 mmol/L    Potassium 5.2 3.4 - 5.3 mmol/L    Chloride 109 94 - 109 mmol/L    Carbon Dioxide (CO2) 26 20 - 32 mmol/L    Anion Gap 6 3 - 14 mmol/L    Urea Nitrogen 15 7 - 30 mg/dL    Creatinine 0.63 0.52 - 1.04 mg/dL    Calcium 9.5 8.5 - 10.1 mg/dL    Glucose 92 70 - 99 mg/dL    GFR Estimate >90 >60 mL/min/1.73m2   TSH with free T4 reflex     Status: Normal   Result Value Ref Range    TSH 2.88 0.40 - 4.00 mU/L   CBC with platelets and differential     Status: Abnormal   Result Value Ref Range    WBC Count 3.8 (L) 4.0 - 11.0 10e3/uL    RBC Count 4.39 3.80 - 5.20 10e6/uL    Hemoglobin 13.6 11.7 - 15.7 g/dL    Hematocrit 39.9 35.0 - 47.0 %    MCV 91 78 - 100 fL    MCH 31.0 26.5 - 33.0 pg    MCHC 34.1 31.5 - 36.5 g/dL    RDW 12.4 10.0 - 15.0 %    Platelet Count 271 150 - 450 10e3/uL    % Neutrophils 43 %    % Lymphocytes 38 %    % Monocytes 15 %    % Eosinophils 3 %    % Basophils 1 %    Absolute Neutrophils 1.6 1.6 - 8.3 10e3/uL    Absolute Lymphocytes 1.4 0.8 - 5.3 10e3/uL    Absolute Monocytes 0.6 0.0 - 1.3 10e3/uL    Absolute Eosinophils 0.1 0.0 - 0.7 10e3/uL    Absolute Basophils 0.0 0.0 - 0.2 10e3/uL   Quantiferon TB Gold Plus Grey Tube     Status: None    Specimen: Arm, Right; Blood   Result Value Ref Range    Quantiferon Nil Tube 0.00 IU/mL   Quantiferon TB Gold Plus Green Tube     Status: None    Specimen: Arm, Right; Blood   Result Value Ref Range    Quantiferon TB1 Tube 0.00 IU/mL   Quantiferon TB Gold Plus Yellow Tube     Status: None    Specimen: Arm, Right; Blood   Result Value Ref Range    Quantiferon TB2 Tube 0.00    Quantiferon TB Gold Plus Purple Tube     Status: None    Specimen: Arm, Right; Blood   Result Value Ref Range    Quantiferon Mitogen 10.00 IU/mL   Quantiferon TB Gold Plus      Status: None    Specimen: Arm, Right; Blood   Result Value Ref Range    Quantiferon-TB Gold Plus Negative Negative    TB1 Ag minus Nil Value 0.00 IU/mL    TB2 Ag minus Nil Value 0.00 IU/mL    Mitogen minus Nil Result 10.00 IU/mL    Nil Result 0.00 IU/mL   CBC with platelets and differential     Status: Abnormal    Narrative    The following orders were created for panel order CBC with platelets and differential.  Procedure                               Abnormality         Status                     ---------                               -----------         ------                     CBC with platelets and d...[[id number]]  Abnormal            Final result                 Please view results for these tests on the individual orders.   Quantiferon TB Gold Plus     Status: None    Specimen: Arm, Right; Blood    Narrative    The following orders were created for panel order Quantiferon TB Gold Plus.  Procedure                               Abnormality         Status                     ---------                               -----------         ------                     Quantiferon TB Gold Plus[[id number]]                         Final result               Quantiferon TB Gold Plus...[[id number]]                      Final result               Quantiferon TB Gold Plus...[[id number]]                      Final result               Quantiferon TB Gold Plus...[[id number]]                      Final result               Quantiferon TB Gold Plus...[[id number]]                      Final result                 Please view results for these tests on the individual orders.

## 2022-07-15 LAB
ANION GAP SERPL CALCULATED.3IONS-SCNC: 6 MMOL/L (ref 3–14)
BUN SERPL-MCNC: 15 MG/DL (ref 7–30)
CALCIUM SERPL-MCNC: 9.5 MG/DL (ref 8.5–10.1)
CHLORIDE BLD-SCNC: 109 MMOL/L (ref 94–109)
CHOLEST SERPL-MCNC: 268 MG/DL
CO2 SERPL-SCNC: 26 MMOL/L (ref 20–32)
CREAT SERPL-MCNC: 0.63 MG/DL (ref 0.52–1.04)
FASTING STATUS PATIENT QL REPORTED: YES
GFR SERPL CREATININE-BSD FRML MDRD: >90 ML/MIN/1.73M2
GLUCOSE BLD-MCNC: 92 MG/DL (ref 70–99)
HDLC SERPL-MCNC: 75 MG/DL
LDLC SERPL CALC-MCNC: 178 MG/DL
NONHDLC SERPL-MCNC: 193 MG/DL
POTASSIUM BLD-SCNC: 5.2 MMOL/L (ref 3.4–5.3)
SODIUM SERPL-SCNC: 141 MMOL/L (ref 133–144)
TRIGL SERPL-MCNC: 74 MG/DL

## 2022-07-16 LAB
GAMMA INTERFERON BACKGROUND BLD IA-ACNC: 0 IU/ML
M TB IFN-G BLD-IMP: NEGATIVE
M TB IFN-G CD4+ BCKGRND COR BLD-ACNC: 10 IU/ML
MITOGEN IGNF BCKGRD COR BLD-ACNC: 0 IU/ML
MITOGEN IGNF BCKGRD COR BLD-ACNC: 0 IU/ML
QUANTIFERON MITOGEN: 10 IU/ML
QUANTIFERON NIL TUBE: 0 IU/ML
QUANTIFERON TB1 TUBE: 0 IU/ML
QUANTIFERON TB2 TUBE: 0
TSH SERPL DL<=0.005 MIU/L-ACNC: 2.88 MU/L (ref 0.4–4)

## 2022-07-18 NOTE — PROGRESS NOTES
SUBJECTIVE:   CC: Amanda Sifuentes is an 57 year old woman who presents for preventive health visit.       Patient has been advised of split billing requirements and indicates understanding: Yes  Healthy Habits:     Getting at least 3 servings of Calcium per day:  Yes    Bi-annual eye exam:  Yes    Dental care twice a year:  Yes    Sleep apnea or symptoms of sleep apnea:  None    Diet:  Regular (no restrictions)    Frequency of exercise:  2-3 days/week    Duration of exercise:  30-45 minutes    Taking medications regularly:  Yes    Medication side effects:  None    PHQ-2 Total Score: 0    Additional concerns today:  No    TB GOLD Test results printed for work  Would like to talk about 4th COVID, not currently required for her job       She takes tagament prn    Migraines-not frequent      colonoscopy 19-every 5 years  mammo this week    No real smoking smoking    Pap done not due in 2026    The 10-year ASCVD risk score (Honey SILVA Jr., et al., 2013) is: 3.3%    Values used to calculate the score:      Age: 57 years      Sex: Female      Is Non- : No      Diabetic: No      Tobacco smoker: No      Systolic Blood Pressure: 134 mmHg      Is BP treated: No      HDL Cholesterol: 75 mg/dL      Total Cholesterol: 268 mg/dL    Today's PHQ-2 Score:   PHQ-2 ( 1999 Pfizer) 7/19/2021   Q1: Little interest or pleasure in doing things 0   Q2: Feeling down, depressed or hopeless 0   PHQ-2 Score 0   PHQ-2 Total Score (12-17 Years)- Positive if 3 or more points; Administer PHQ-A if positive 0   Q1: Little interest or pleasure in doing things Not at all   Q2: Feeling down, depressed or hopeless Not at all   PHQ-2 Score 0       Abuse: Current or Past (Physical, Sexual or Emotional) - No  Do you feel safe in your environment? Yes        Social History     Tobacco Use     Smoking status: Never Smoker     Smokeless tobacco: Never Used   Substance Use Topics     Alcohol use: Yes     Comment: 3 drinks per wek           Alcohol Use 7/19/2021   Prescreen: >3 drinks/day or >7 drinks/week? No   Prescreen: >3 drinks/day or >7 drinks/week? -       Reviewed orders with patient.  Reviewed health maintenance and updated orders accordingly - Yes  BP Readings from Last 3 Encounters:   07/19/22 134/76   04/18/22 (!) 148/95   07/19/21 (!) 151/88    Wt Readings from Last 3 Encounters:   07/19/22 64.9 kg (143 lb)   04/18/22 65.3 kg (144 lb)   07/19/21 65.5 kg (144 lb 6.4 oz)                    Breast Cancer Screening:  Any new diagnosis of family breast, ovarian, or bowel cancer? No    FHS-7:   Breast CA Risk Assessment (FHS-7) 7/19/2021   Did any of your first-degree relatives have breast or ovarian cancer? No   Did any of your relatives have bilateral breast cancer? No   Did any man in your family have breast cancer? No   Did any woman in your family have breast and ovarian cancer? No   Did any woman in your family have breast cancer before age 50 y? No   Do you have 2 or more relatives with breast and/or ovarian cancer? No   Do you have 2 or more relatives with breast and/or bowel cancer? No     click delete button to remove this line now  Mammogram Screening: Recommended mammography every 1-2 years with patient discussion and risk factor consideration  Pertinent mammograms are reviewed under the imaging tab.    History of abnormal Pap smear: NO - age 30- 65 PAP every 3 years recommended  PAP / HPV Latest Ref Rng & Units 7/19/2021 7/19/2021 4/9/2018   PAP   Negative for Intraepithelial Lesion or Malignancy (NILM) Negative for Intraepithelial Lesion or Malignancy (NILM) -   PAP (Historical) - - - NIL   HPV16 - Negative Negative -   HPV18 - Negative Negative -   HRHPV - Negative Negative -     Reviewed and updated as needed this visit by clinical staff                    Reviewed and updated as needed this visit by Provider                   Past Medical History:   Diagnosis Date     Esophageal reflux      Family history of colon cancer       Globus sensation      Multiple thyroid nodules     ,  FNAB benign     Other forms of migraine, without mention of intractable migraine without mention of status migrainosus      Unspecified hemorrhoids without mention of complication       Past Surgical History:   Procedure Laterality Date     COLONOSCOPY  2013    Procedure: COLONOSCOPY;;  Surgeon: Jim Flores MD;  Location:  GI     COLONOSCOPY N/A 2019    Procedure: COLONOSCOPY;  Surgeon: Luisito Barnard MD;  [facility]: [facility]     HC EXCISION BREAST LESION, OPEN >=1      benign     HEMORRHOID SURGERY       HYSTERECTOMY, PAP STILL INDICATED      benign/with both ovaries(cervix in)     OB History    Para Term  AB Living   0 0 0 0 0 0   SAB IAB Ectopic Multiple Live Births   0 0 0 0 0       Review of Systems   Constitutional: Negative for chills and fever.   HENT: Negative for congestion, ear pain, hearing loss and sore throat.    Eyes: Negative for pain and visual disturbance.   Respiratory: Negative for cough and shortness of breath.    Cardiovascular: Negative for chest pain, palpitations and peripheral edema.   Gastrointestinal: Negative for abdominal pain, constipation, diarrhea, heartburn, hematochezia and nausea.   Breasts:  Negative for tenderness, breast mass and discharge.   Genitourinary: Negative for dysuria, frequency, genital sores, hematuria, pelvic pain, urgency, vaginal bleeding and vaginal discharge.   Musculoskeletal: Negative for arthralgias, joint swelling and myalgias.   Skin: Negative for rash.   Neurological: Negative for dizziness, weakness, headaches and paresthesias.   Psychiatric/Behavioral: Negative for mood changes. The patient is not nervous/anxious.      CONSTITUTIONAL: NEGATIVE for fever, chills, change in weight  INTEGUMENTARY/SKIN: NEGATIVE for worrisome rashes, moles or lesions  EYES: NEGATIVE for vision changes or irritation  ENT: NEGATIVE for ear, mouth and throat  problems  RESP: NEGATIVE for significant cough or SOB  BREAST: NEGATIVE for masses, tenderness or discharge  CV: NEGATIVE for chest pain, palpitations or peripheral edema  GI: NEGATIVE for nausea, abdominal pain, heartburn, or change in bowel habits  : NEGATIVE for unusual urinary or vaginal symptoms. No vaginal bleeding.  MUSCULOSKELETAL: NEGATIVE for significant arthralgias or myalgia  NEURO: NEGATIVE for weakness, dizziness or paresthesias  PSYCHIATRIC: NEGATIVE for changes in mood or affect      OBJECTIVE:   LMP  (LMP Unknown)   Physical Exam  GENERAL: healthy, alert and no distress  EYES: Eyes grossly normal to inspection, PERRL and conjunctivae and sclerae normal  HENT: ear canals and TM's normal, nose and mouth without ulcers or lesions  NECK: no adenopathy, no asymmetry, masses, or scars and thyroid normal to palpation  RESP: lungs clear to auscultation - no rales, rhonchi or wheezes  BREAST: normal without masses, tenderness or nipple discharge and no palpable axillary masses or adenopathy  CV: regular rate and rhythm, normal S1 S2, no S3 or S4, no murmur, click or rub, no peripheral edema and peripheral pulses strong  ABDOMEN: soft, nontender, no hepatosplenomegaly, no masses and bowel sounds normal   (female): normal female external genitalia, normal urethral meatus, vaginal mucosa pink, moist, well rugated, and normal cervix/adnexa/uterus without masses or discharge  MS: no gross musculoskeletal defects noted, no edema  SKIN: no suspicious lesions or rashes  NEURO: Normal strength and tone, mentation intact and speech normal  PSYCH: mentation appears normal, affect normal/bright    Diagnostic Test Results:  Labs reviewed in Epic    ASSESSMENT/PLAN:       ICD-10-CM    1. Routine general medical examination at a health care facility  Z00.00    2. Other migraine, not intractable, without status migrainosus  G43.809 SUMAtriptan (IMITREX) 25 MG tablet   3. Menopause  Z78.0    4. Gastroesophageal reflux  "disease without esophagitis  K21.9 cimetidine (TAGAMET) 400 MG tablet     omeprazole (PRILOSEC) 40 MG DR capsule   5. Hiatal hernia  K44.9    6. Hemorrhoids, unspecified hemorrhoid type  K64.9 DISCONTINUED: nitroGLYcerin (NITRO-BID) 2 % OINT ointment   7. Allergic conjunctivitis of both eyes  H10.13 olopatadine (PATANOL) 0.1 % ophthalmic solution   8. Acne vulgaris  L70.0 desonide (DESOWEN) 0.05 % external ointment   9. Borderline high cholesterol  E78.9      HEADACHE-stable     Hiatal hernia/GERD-stable, cont med    Acne-stable    borderline high cholesterol-stable,labs    hemorroids -stable,cont cream              Make sure you have had 3 hep b shots and 2 hep A shots  Bring in records    Consider checking vit D    Cont all meds    Patient has been advised of split billing requirements and indicates understanding: Yes    COUNSELING:  Reviewed preventive health counseling, as reflected in patient instructions       Regular exercise       Healthy diet/nutrition       Vision screening    Estimated body mass index is 23.6 kg/m  as calculated from the following:    Height as of 4/18/22: 1.664 m (5' 5.5\").    Weight as of 4/18/22: 65.3 kg (144 lb).        She reports that she has never smoked. She has never used smokeless tobacco.      Counseling Resources:  ATP IV Guidelines  Pooled Cohorts Equation Calculator  Breast Cancer Risk Calculator  BRCA-Related Cancer Risk Assessment: FHS-7 Tool  FRAX Risk Assessment  ICSI Preventive Guidelines  Dietary Guidelines for Americans, 2010  USDA's MyPlate  ASA Prophylaxis  Lung CA Screening    DO ABDIEL [facility]  "

## 2022-07-18 NOTE — PATIENT INSTRUCTIONS
Make sure you have had 3 hep b shots and 2 hep A shots  Bring in records    Consider checking vit D    Cont all meds  Take care  See you in a year  Noreen Ventura D.O.        Mammogram Scheduling  [facility] [phone] or [phone]  [facility] [phone]  Patient Education     Preventive Health Recommendations  Female Ages 50 - 64    Yearly exam: See your health care provider every year in order to  Review health changes.   Discuss preventive care.    Review your medicines if your doctor has prescribed any.    Get a Pap test every three years (unless you have an abnormal result and your provider advises testing more often).  If you get Pap tests with HPV test, you only need to test every 5 years, unless you have an abnormal result.   You do not need a Pap test if your uterus was removed (hysterectomy) and you have not had cancer.  You should be tested each year for STDs (sexually transmitted diseases) if you're at risk.   Have a mammogram every 1 to 2 years.  Have a colonoscopy at age 50, or have a yearly FIT test (stool test). These exams screen for colon cancer.    Have a cholesterol test every 5 years, or more often if advised.  Have a diabetes test (fasting glucose) every three years. If you are at risk for diabetes, you should have this test more often.   If you are at risk for osteoporosis (brittle bone disease), think about having a bone density scan (DEXA).    Shots: Get a flu shot each year. Get a tetanus shot every 10 years.    Nutrition:   Eat at least 5 servings of fruits and vegetables each day.  Eat whole-grain bread, whole-wheat pasta and brown rice instead of white grains and rice.  Get adequate Calcium and Vitamin D.     Lifestyle  Exercise at least 150 minutes a week (30 minutes a day, 5 days a week). This will help you control your weight and prevent disease.  Limit alcohol to one drink per day.  No smoking.   Wear sunscreen to prevent skin cancer.   See your dentist every six months  Telephone Encounter by Rosalva Villavicencio at 07/18/17 09:23 AM     Author:  Rosalva Villavicencio Service:  (none) Author Type:  Admin Staff     Filed:  07/18/17 09:25 AM Encounter Date:  7/7/2017 Status:  Signed     :  Rosalva Villavicencio (Admin Staff)            Please open a Medical Records encounter. Thank you[DC1.1M]      Revision History        User Key Date/Time User Provider Type Action    > DC1.1 07/18/17 09:25 AM Rosalva Villavicencio Admin Staff Sign    M - Manual             for an exam and cleaning.  See your eye doctor every 1 to 2 years.

## 2022-07-19 ENCOUNTER — TELEPHONE (OUTPATIENT)
Dept: FAMILY MEDICINE | Facility: CLINIC | Age: 57
End: 2022-07-19

## 2022-07-19 ENCOUNTER — OFFICE VISIT (OUTPATIENT)
Dept: FAMILY MEDICINE | Facility: CLINIC | Age: 57
End: 2022-07-19
Payer: COMMERCIAL

## 2022-07-19 ENCOUNTER — ANCILLARY PROCEDURE (OUTPATIENT)
Dept: MAMMOGRAPHY | Facility: CLINIC | Age: 57
End: 2022-07-19
Attending: OBSTETRICS & GYNECOLOGY
Payer: COMMERCIAL

## 2022-07-19 VITALS
SYSTOLIC BLOOD PRESSURE: 134 MMHG | RESPIRATION RATE: 16 BRPM | TEMPERATURE: 98.1 F | DIASTOLIC BLOOD PRESSURE: 76 MMHG | HEART RATE: 78 BPM | WEIGHT: 143 LBS | HEIGHT: 66 IN | BODY MASS INDEX: 22.98 KG/M2 | OXYGEN SATURATION: 100 %

## 2022-07-19 DIAGNOSIS — G43.809 OTHER MIGRAINE, NOT INTRACTABLE, WITHOUT STATUS MIGRAINOSUS: ICD-10-CM

## 2022-07-19 DIAGNOSIS — L70.0 ACNE VULGARIS: ICD-10-CM

## 2022-07-19 DIAGNOSIS — Z00.00 ROUTINE GENERAL MEDICAL EXAMINATION AT A HEALTH CARE FACILITY: Primary | ICD-10-CM

## 2022-07-19 DIAGNOSIS — K64.9 HEMORRHOIDS, UNSPECIFIED HEMORRHOID TYPE: ICD-10-CM

## 2022-07-19 DIAGNOSIS — Z78.0 MENOPAUSE: ICD-10-CM

## 2022-07-19 DIAGNOSIS — H10.13 ALLERGIC CONJUNCTIVITIS OF BOTH EYES: ICD-10-CM

## 2022-07-19 DIAGNOSIS — K21.9 GASTROESOPHAGEAL REFLUX DISEASE WITHOUT ESOPHAGITIS: ICD-10-CM

## 2022-07-19 DIAGNOSIS — Z12.31 VISIT FOR SCREENING MAMMOGRAM: ICD-10-CM

## 2022-07-19 DIAGNOSIS — K44.9 HIATAL HERNIA: ICD-10-CM

## 2022-07-19 DIAGNOSIS — E78.9 BORDERLINE HIGH CHOLESTEROL: ICD-10-CM

## 2022-07-19 PROCEDURE — 77067 SCR MAMMO BI INCL CAD: CPT | Performed by: RADIOLOGY

## 2022-07-19 PROCEDURE — 99214 OFFICE O/P EST MOD 30 MIN: CPT | Mod: 25 | Performed by: FAMILY MEDICINE

## 2022-07-19 PROCEDURE — 77063 BREAST TOMOSYNTHESIS BI: CPT | Performed by: RADIOLOGY

## 2022-07-19 PROCEDURE — 99396 PREV VISIT EST AGE 40-64: CPT | Performed by: FAMILY MEDICINE

## 2022-07-19 RX ORDER — GABAPENTIN 100 MG/1
CAPSULE ORAL
Qty: 63 CAPSULE | Refills: 0 | Status: CANCELLED | OUTPATIENT
Start: 2022-07-19 | End: 2022-08-16

## 2022-07-19 RX ORDER — SUMATRIPTAN 25 MG/1
TABLET, FILM COATED ORAL
Qty: 9 TABLET | Refills: 4 | Status: SHIPPED | OUTPATIENT
Start: 2022-07-19 | End: 2023-04-03

## 2022-07-19 RX ORDER — CIMETIDINE 400 MG
400 TABLET ORAL AT BEDTIME
Qty: 90 TABLET | Refills: 3 | Status: SHIPPED | OUTPATIENT
Start: 2022-07-19 | End: 2023-07-18

## 2022-07-19 RX ORDER — DESONIDE 0.5 MG/G
OINTMENT TOPICAL
Qty: 30 G | Refills: 1 | Status: SHIPPED | OUTPATIENT
Start: 2022-07-19 | End: 2023-07-18

## 2022-07-19 RX ORDER — OMEPRAZOLE 40 MG/1
40 CAPSULE, DELAYED RELEASE ORAL DAILY
Qty: 90 CAPSULE | Refills: 3 | Status: SHIPPED | OUTPATIENT
Start: 2022-07-19 | End: 2023-07-18

## 2022-07-19 RX ORDER — NITROGLYCERIN 20 MG/G
OINTMENT TOPICAL
Qty: 15 G | Refills: 1 | Status: SHIPPED | OUTPATIENT
Start: 2022-07-19 | End: 2022-07-19

## 2022-07-19 RX ORDER — OLOPATADINE HYDROCHLORIDE 1 MG/ML
1 SOLUTION/ DROPS OPHTHALMIC 2 TIMES DAILY
Qty: 5 ML | Refills: 3 | Status: SHIPPED | OUTPATIENT
Start: 2022-07-19 | End: 2023-07-18

## 2022-07-19 RX ORDER — ESTRADIOL 0.5 MG/1
0.5 TABLET ORAL DAILY
Qty: 90 TABLET | Refills: 3 | Status: CANCELLED | OUTPATIENT
Start: 2022-07-19

## 2022-07-19 RX ORDER — NITROGLYCERIN 20 MG/G
OINTMENT TOPICAL
Qty: 15 G | Refills: 1 | Status: SHIPPED | OUTPATIENT
Start: 2022-07-19 | End: 2023-07-18

## 2022-07-19 ASSESSMENT — ENCOUNTER SYMPTOMS
DIZZINESS: 0
HEADACHES: 0
DIARRHEA: 0
FREQUENCY: 0
MYALGIAS: 0
CONSTIPATION: 0
BREAST MASS: 0
PARESTHESIAS: 0
HEMATOCHEZIA: 0
SORE THROAT: 0
SHORTNESS OF BREATH: 0
ARTHRALGIAS: 0
HEMATURIA: 0
JOINT SWELLING: 0
PALPITATIONS: 0
HEARTBURN: 0
CHILLS: 0
NERVOUS/ANXIOUS: 0
ABDOMINAL PAIN: 0
NAUSEA: 0
EYE PAIN: 0
WEAKNESS: 0
FEVER: 0
DYSURIA: 0
COUGH: 0

## 2022-07-19 ASSESSMENT — PAIN SCALES - GENERAL: PAINLEVEL: NO PAIN (0)

## 2022-07-19 NOTE — TELEPHONE ENCOUNTER
Routing to Dr. Bhupinder Manzo to send rx to compounding pharmacy?    Rx pending approval     Christi Tang RN

## 2022-07-19 NOTE — TELEPHONE ENCOUNTER
Patient establishing today, wanted it sent there, is is a refill and was previously sent there. Please call and ask where she wants it sent and please update her pharmacy,  I am not sure which is the compounding pharmacy  Noreen Ventura D.O.

## 2022-07-19 NOTE — TELEPHONE ENCOUNTER
[facility] #[id number] faxed Script Clarification re: nitroGLYcerin (NITRO-BID) 2 % OINT ointment    PHARMACY COMMENTS:  Cannot make this compound or mix it in the pharmacy.  Please resend to a compounding pharmacy    Thank you,  [facility] Pharmacist

## 2022-07-29 ENCOUNTER — ANCILLARY PROCEDURE (OUTPATIENT)
Dept: MAMMOGRAPHY | Facility: CLINIC | Age: 57
End: 2022-07-29
Attending: OBSTETRICS & GYNECOLOGY
Payer: COMMERCIAL

## 2022-07-29 DIAGNOSIS — R92.8 ABNORMAL MAMMOGRAM: ICD-10-CM

## 2022-07-29 DIAGNOSIS — R92.8 BI-RADS CATEGORY 3 MAMMOGRAM RESULT: Primary | ICD-10-CM

## 2022-07-29 PROCEDURE — 77065 DX MAMMO INCL CAD UNI: CPT | Mod: LT

## 2022-07-29 NOTE — LETTER
Amanda Sifuentes  [address] [address]        July 29, 2022  Date of Exam:     Dear Amanda:    Thank you for your recent visit.    Breast Imaging Result: Your breast imaging examination showed an area that we believe is benign (not cancer). We recommend that you come back again prior to your next yearly breast imaging for 6 month short term follow-up, or in certain instances, biopsy. If you have not already scheduled this follow-up exam, please call [phone].     Breast Density: Your breast imaging shows that you have dense breast tissue. This means you have a slightly higher risk of getting breast cancer. It also means your breast imaging will be harder to read, but it doesn't mean that breast imaging isn't useful. In fact, yearly breast imaging is even more important for individuals at higher risk. Additional testing may be warranted depending on your overall risk.    As you know, early detection of cancer is very important. Although not all cancers are found through breast imaging it is the most accurate method for early detection. A thorough examination includes a combination of breast imaging and a physical examination by your health care professional. Therefore, if you have not had a recent physical exam of your breasts see your health care team.    A report of your breast imaging results was sent to: [facility]    Your breast imaging will become part of your medical file here at [facility] for at least 10 years. You are responsible for informing any new health care team or breast imaging facility of the date and location of this examination.    We appreciate the opportunity to participate in your health care.    Sincerely,  Dr Leonard MEADOWS [facility]

## 2022-09-29 ENCOUNTER — OFFICE VISIT (OUTPATIENT)
Dept: OPHTHALMOLOGY | Facility: CLINIC | Age: 57
End: 2022-09-29
Attending: OPHTHALMOLOGY
Payer: COMMERCIAL

## 2022-09-29 DIAGNOSIS — H01.119 CONTACT AND ALLERGIC DERMATITIS OF EYELID: Primary | ICD-10-CM

## 2022-09-29 DIAGNOSIS — L71.9 ROSACEA: ICD-10-CM

## 2022-09-29 PROCEDURE — 99204 OFFICE O/P NEW MOD 45 MIN: CPT | Performed by: OPHTHALMOLOGY

## 2022-09-29 PROCEDURE — G0463 HOSPITAL OUTPT CLINIC VISIT: HCPCS

## 2022-09-29 RX ORDER — NEOMYCIN SULFATE, POLYMYXIN B SULFATE, AND DEXAMETHASONE 3.5; 10000; 1 MG/G; [USP'U]/G; MG/G
0.5 OINTMENT OPHTHALMIC AT BEDTIME
Qty: 3.5 G | Refills: 0 | Status: SHIPPED | OUTPATIENT
Start: 2022-09-29

## 2022-09-29 ASSESSMENT — TONOMETRY
OS_IOP_MMHG: 13
IOP_METHOD: ICARE
OD_IOP_MMHG: 14

## 2022-09-29 ASSESSMENT — SLIT LAMP EXAM - LIDS: COMMENTS: TR BLEPHARITIS

## 2022-09-29 ASSESSMENT — VISUAL ACUITY
METHOD: SNELLEN - LINEAR
OD_SC: 20/30
OD_SC+: -2
OS_SC: 20/20
OD_PH_SC: 20/20

## 2022-09-29 ASSESSMENT — CONF VISUAL FIELD
OD_NORMAL: 1
METHOD: COUNTING FINGERS
OS_NORMAL: 1

## 2022-09-29 ASSESSMENT — EXTERNAL EXAM - RIGHT EYE: OD_EXAM: ROSACEA

## 2022-09-29 NOTE — PROGRESS NOTES
Chief Complaint(s) and History of Present Illness(es)     New Patient     Laterality: both eyes    Associated symptoms: tearing and itching.  Negative for flashes and   floaters    Pain scale: 0/10              Comments     New patient presents with BUL rash and itchiness.  The patient notes she hasn't worn make-up for a long time but when she   applied make up on  she had some itchiness.  Since then the symptoms have worsened.  She developed a rash.  She tried   an older prescription of EES ointment.  She has since tried Vaseline to   keep her eye lids moist.  She notes that currently she has FB sensation   and continued itchiness.   Tegan Blank, COA, COA 8:37 AM 2022              Review of systems for the eyes was negative other than the pertinent positives/negatives listed in the HPI.      Assessment & Plan      Amanda Sifuentes is a 57 year old female with the following diagnoses:   1. Contact and allergic dermatitis of eyelid    2. Rosacea         Here for itchy rash on right upper lid > left upper lid since wearing makeup earlier this month  Previously tolerated the product, but thinks it may have been   Using ESS and vaseline helped some, but the rash persists and is bothersome    Discussed contributing factors  Continue to avoid makeup around eyes  Start maxitrol mary to the lids at bedtime for 2 weeks   Recommend lid hygiene and warm compresses  Recommend artifical tears as needed     Had recent complete exam with her local optometrist in July.  Deferred dilation today     Patient disposition:   Return if symptoms worsen or fail to improve.           Attending Physician Attestation:  Complete documentation of historical and exam elements from today's encounter can be found in the full encounter summary report (not reduplicated in this progress note).  I personally obtained the chief complaint(s) and history of present illness.  I confirmed and edited as necessary the review  of systems, past medical/surgical history, family history, social history, and examination findings as documented by others; and I examined the patient myself.  I personally reviewed the relevant tests, images, and reports as documented above.  I formulated and edited as necessary the assessment and plan and discussed the findings and management plan with the patient and family. . - Wilman Marlow MD

## 2022-09-29 NOTE — NURSING NOTE
Chief Complaints and History of Present Illnesses   Patient presents with     New Patient     Chief Complaint(s) and History of Present Illness(es)     New Patient     Laterality: both eyes    Associated symptoms: tearing and itching.  Negative for flashes and floaters    Pain scale: 0/10              Comments     New patient presents with BUL rash and itchiness.  The patient notes she hasn't worn make-up for a long time but when she applied make up on September 9th she had some itchiness.  Since then the symptoms have worsened.  She developed a rash.  She tried an older prescription of EES ointment.  She has since tried Vaseline to keep her eye lids moist.  She notes that currently she has FB sensation and continued itchiness.   Tegan Blank, COA, COA 8:37 AM 09/29/2022

## 2022-10-21 ENCOUNTER — IMMUNIZATION (OUTPATIENT)
Dept: FAMILY MEDICINE | Facility: CLINIC | Age: 57
End: 2022-10-21
Payer: COMMERCIAL

## 2022-10-21 DIAGNOSIS — Z23 HIGH PRIORITY FOR 2019-NCOV VACCINE: ICD-10-CM

## 2022-10-21 DIAGNOSIS — Z23 NEED FOR PROPHYLACTIC VACCINATION AND INOCULATION AGAINST INFLUENZA: ICD-10-CM

## 2022-10-21 PROCEDURE — 90682 RIV4 VACC RECOMBINANT DNA IM: CPT

## 2022-10-21 PROCEDURE — 91312 COVID-19,PF,PFIZER BOOSTER BIVALENT: CPT

## 2022-10-21 PROCEDURE — 0124A COVID-19,PF,PFIZER BOOSTER BIVALENT: CPT

## 2022-10-21 PROCEDURE — 90471 IMMUNIZATION ADMIN: CPT

## 2022-11-03 ENCOUNTER — OFFICE VISIT (OUTPATIENT)
Dept: FAMILY MEDICINE | Facility: CLINIC | Age: 57
End: 2022-11-03
Payer: COMMERCIAL

## 2022-11-03 ENCOUNTER — TELEPHONE (OUTPATIENT)
Dept: SURGERY | Facility: CLINIC | Age: 57
End: 2022-11-03

## 2022-11-03 VITALS
RESPIRATION RATE: 16 BRPM | SYSTOLIC BLOOD PRESSURE: 132 MMHG | BODY MASS INDEX: 24.32 KG/M2 | OXYGEN SATURATION: 99 % | HEART RATE: 86 BPM | DIASTOLIC BLOOD PRESSURE: 94 MMHG | WEIGHT: 148.4 LBS

## 2022-11-03 DIAGNOSIS — L72.9 BENIGN SKIN CYST: Primary | ICD-10-CM

## 2022-11-03 PROCEDURE — 99213 OFFICE O/P EST LOW 20 MIN: CPT | Performed by: FAMILY MEDICINE

## 2022-11-03 NOTE — PROGRESS NOTES
Assessment & Plan     Benign skin cyst  Given location, will have general surgery take a look and determine if needs to be removed. No cellulitis appreciated today.   - Adult General Surg Referral          Return in about 6 months (around 5/3/2023) for Routine preventive, in person, with PCP.    Mary Villegas MD  [facility]    Chely Posey is a 57 year old{ presenting for the following health issues:  Derm Problem (Cyst on back x 1 year. /In the last 3-4 months it has gotten bigger and unable to to get fluid out. /)      History of Present Illness       Reason for visit:  Cyst on back  Symptom onset:  More than a month  Symptoms include:  Lump gotten larger and painful  Symptom intensity:  Moderate  Symptom progression:  Staying the same  Had these symptoms before:  Yes  Has tried/received treatment for these symptoms:  Yes  Previous treatment was successful:  Yes  Prior treatment description:  Doctor removed  What makes it worse:  No  What makes it better:  No    She eats 2-3 servings of fruits and vegetables daily.She consumes 0 sweetened beverage(s) daily.She exercises with enough effort to increase her heart rate 30 to 60 minutes per day.  She exercises with enough effort to increase her heart rate 3 or less days per week.   She is taking medications regularly.       Review of Systems   Constitutional, HEENT, cardiovascular, pulmonary, gi and gu systems are negative, except as otherwise noted.      Objective    BP (!) 132/94   Pulse 86   Resp 16   Wt 67.3 kg (148 lb 6.4 oz)   LMP  (LMP Unknown)   SpO2 99%   BMI 24.32 kg/m    Body mass index is 24.32 kg/m .  Physical Exam   GENERAL: healthy, alert and no distress  SKIN: 3x7 cm cyst mid back, overlying spinal column, irregular border  NEURO: Normal strength and tone, mentation intact and speech normal  PSYCH: mentation appears normal, affect normal/bright

## 2022-11-03 NOTE — TELEPHONE ENCOUNTER
Returned patients phone call. She is not having any active signs of infection of the EIC at this time. First available opening is for 1/6/22 at this time. Patient declines to schedule at this time. Is going to call [facility] in [place] and [place] to check their availability and will return call if she wants to schedule here at the [place] location.

## 2022-11-03 NOTE — TELEPHONE ENCOUNTER
[facility] Call Center    Phone Message    May a detailed message be left on voicemail: yes     Reason for Call: Other: Referral : DX: epidermal inclusion cyst? mid back over spine pt would like removed. referred by   Mary Villegas MD Timeframe need to be seen: 1-2 weeks .    Please call pt back Thank you!    Action Taken: Message routed to:  [facility] Gen surg    Travel Screening: Not Applicable

## 2022-11-10 ENCOUNTER — OFFICE VISIT (OUTPATIENT)
Dept: SURGERY | Facility: CLINIC | Age: 57
End: 2022-11-10
Attending: FAMILY MEDICINE
Payer: COMMERCIAL

## 2022-11-10 VITALS
WEIGHT: 148 LBS | HEIGHT: 66 IN | DIASTOLIC BLOOD PRESSURE: 74 MMHG | SYSTOLIC BLOOD PRESSURE: 138 MMHG | BODY MASS INDEX: 23.78 KG/M2

## 2022-11-10 DIAGNOSIS — L72.3 SEBACEOUS CYST: Primary | ICD-10-CM

## 2022-11-10 PROCEDURE — 11400 EXC TR-EXT B9+MARG 0.5 CM<: CPT | Performed by: SURGERY

## 2022-11-10 PROCEDURE — 99203 OFFICE O/P NEW LOW 30 MIN: CPT | Mod: 25 | Performed by: SURGERY

## 2022-11-10 RX ORDER — ASPIRIN 81 MG/1
81 TABLET ORAL DAILY
COMMUNITY

## 2022-11-10 NOTE — LETTER
11/10/2022         RE: Amanda Sifuentes  [address] [address]        Dear Colleague,    Thank you for referring your patient, Amanda Sifuentes, to the [facility] [place]. Please see a copy of my visit note below.    History:   Amanda Sifuentes is a 57 year old female referred to general surgery by Dr. Villegas for a back cyst.  This has been present for a few years.  She will intermittently push on it and get some thick white material out.  She has had 1 of these in the past and it was removed by her PCP.  Last week the cyst became acutely enlarged and painful.  She denies having any skin changes such as erythema.  She denies having any associated symptoms such as fever, chills, nausea, or vomiting.  Due to the location of the cyst near the midline, it was recommended that she follow-up with general surgery for evaluation.    Allergies:  Patient has no known allergies.    Past medical history:  Migraines    Past surgical history:  Blepharoplasty  Hysterectomy  Hemorrhoid surgery    Medications:     ascorbic acid (VITAMIN C) 1000 MG TABS, Take 1 tablet by mouth daily., Disp: , Rfl:      aspirin 81 MG EC tablet, Take 81 mg by mouth daily, Disp: , Rfl:      benzoyl peroxide 5 % topical gel, Apply topically daily, Disp: 60 g, Rfl: 1     Calcium Carb-Cholecalciferol 600-800 MG-UNIT TABS, Dose unknown, Disp: , Rfl:      cimetidine (TAGAMET) 400 MG tablet, Take 1 tablet (400 mg) by mouth At Bedtime, Disp: 90 tablet, Rfl: 3     desonide (DESOWEN) 0.05 % external ointment, Apply thin layer to affected area BID., Disp: 30 g, Rfl: 1     estradiol (ESTRACE) 0.5 MG tablet, Take 1 tablet (0.5 mg) by mouth daily Patient needs a follow-up appointment to discuss this medication due to her elevated blood pressures., Disp: 90 tablet, Rfl: 3     GLUCOSAMINE CHONDROITIN OR TABS, TAKE 3 TABLETS (1500MG-GLUCOSAMINE) DAILY, Disp: 300 tab, Rfl: 3     MULTI-VITAMIN OR  "TABS, 1 TABLET DAILY, Disp: 35, Rfl: 0     neomycin-polymyxin-dexamethasone (MAXITROL) 3.5-77867-5.1 ophthalmic ointment, Place 0.5 inches into both eyes At Bedtime, Disp: 3.5 g, Rfl: 0     nitroGLYcerin (NITRO-BID) 2 % OINT ointment, 3-4 times a day. Use with 9 parts petroleum and apply to hemmrhoids.(PLEASE MIX FOR PATIENT), Disp: 15 g, Rfl: 1     olopatadine (PATANOL) 0.1 % ophthalmic solution, Place 1 drop into both eyes 2 times daily, Disp: 5 mL, Rfl: 3     omega-3 fatty acids 1200 MG capsule, Take 1 capsule by mouth daily., Disp: , Rfl:      omeprazole (PRILOSEC) 40 MG DR capsule, Take 1 capsule (40 mg) by mouth daily, Disp: 90 capsule, Rfl: 3     SUMAtriptan (IMITREX) 25 MG tablet, TAKE 1 TABLET BY MOUTH AT ONSET OF MIGRAINE. MAY REPEAT IN 2 HOURS IF NEEDED. MAX OF 2 PER DAY., Disp: 9 tablet, Rfl: 4     vitamin B complex with vitamin C (STRESS TAB) tablet, Take 1 tablet by mouth daily, Disp: , Rfl:      vitamin E 400 UNIT capsule, Take 1 capsule by mouth daily., Disp: , Rfl:     Family history:  No known family history of anesthesia problems    Social history:  Consumes alcohol socially.  Denies tobacco and illicit drug use.  Works for [employer].    Exam:  /74 (BP Location: Right arm, Patient Position: Sitting, Cuff Size: Adult Small)   Ht 1.664 m (5' 5.5\")   Wt 67.1 kg (148 lb)   LMP  (LMP Unknown)   BMI 24.25 kg/m    Body mass index is 24.25 kg/m .  General : Alert, cooperative, appears stated age   Skin: On the mid back just to the right of midline is a small blackhead opening and a 1 cm underlying fluctuance consistent with sebaceous cyst.    Assessment/Plan:   Amanda Sifuentes is a 57 year old female who sounds like had a recent infection with a sebaceous cyst.  It has resolved on its own.  She is interested in having this lesion removed to help prevent future complications.  We felt comfortable removing this in the office under local anesthetic.  Therefore, consent was obtained.  She was " placed in the prone position.  The skin over the lesion on the right mid back was marked and then prepped with Betadine.  20 cc of 1% lidocaine was injected around the planned incision site.  An elliptical longitudinal incision was made which encompassed the small cyst opening.  Scalpel was utilized to dissect into the subcutaneous fat and around the cyst.  The cyst itself measured about 4 mm.  This was discarded.  The incision was then closed with interrupted 3-0 Vicryl followed by coverage with Exofin glue.      It is okay to return to activities as tolerated.    Tylenol as needed for discomfort.    It is okay to shower as early as this evening.    Return to general surgery clinic as needed.     40 minutes was spent for this encounter for chart prep, discussion, documentation, and procedure.    Lisa Hinton DO  General Surgeon  [facility]  Surgery [address] [address]  Office: [phone]  Employed by - [facility]          Again, thank you for allowing me to participate in the care of your patient.        Sincerely,        Lisa Hinton DO

## 2022-11-10 NOTE — PROGRESS NOTES
History:   Amanda Sifuentes is a 57 year old female referred to general surgery by Dr. Villegas for a back cyst.  This has been present for a few years.  She will intermittently push on it and get some thick white material out.  She has had 1 of these in the past and it was removed by her PCP.  Last week the cyst became acutely enlarged and painful.  She denies having any skin changes such as erythema.  She denies having any associated symptoms such as fever, chills, nausea, or vomiting.  Due to the location of the cyst near the midline, it was recommended that she follow-up with general surgery for evaluation.    Allergies:  Patient has no known allergies.    Past medical history:  Migraines    Past surgical history:  Blepharoplasty  Hysterectomy  Hemorrhoid surgery    Medications:     ascorbic acid (VITAMIN C) 1000 MG TABS, Take 1 tablet by mouth daily., Disp: , Rfl:      aspirin 81 MG EC tablet, Take 81 mg by mouth daily, Disp: , Rfl:      benzoyl peroxide 5 % topical gel, Apply topically daily, Disp: 60 g, Rfl: 1     Calcium Carb-Cholecalciferol 600-800 MG-UNIT TABS, Dose unknown, Disp: , Rfl:      cimetidine (TAGAMET) 400 MG tablet, Take 1 tablet (400 mg) by mouth At Bedtime, Disp: 90 tablet, Rfl: 3     desonide (DESOWEN) 0.05 % external ointment, Apply thin layer to affected area BID., Disp: 30 g, Rfl: 1     estradiol (ESTRACE) 0.5 MG tablet, Take 1 tablet (0.5 mg) by mouth daily Patient needs a follow-up appointment to discuss this medication due to her elevated blood pressures., Disp: 90 tablet, Rfl: 3     GLUCOSAMINE CHONDROITIN OR TABS, TAKE 3 TABLETS (1500MG-GLUCOSAMINE) DAILY, Disp: 300 tab, Rfl: 3     MULTI-VITAMIN OR TABS, 1 TABLET DAILY, Disp: 35, Rfl: 0     neomycin-polymyxin-dexamethasone (MAXITROL) 3.5-27079-3.1 ophthalmic ointment, Place 0.5 inches into both eyes At Bedtime, Disp: 3.5 g, Rfl: 0     nitroGLYcerin (NITRO-BID) 2 % OINT ointment, 3-4 times a day. Use with 9 parts petroleum and apply  "to hemmrhoids.(PLEASE MIX FOR PATIENT), Disp: 15 g, Rfl: 1     olopatadine (PATANOL) 0.1 % ophthalmic solution, Place 1 drop into both eyes 2 times daily, Disp: 5 mL, Rfl: 3     omega-3 fatty acids 1200 MG capsule, Take 1 capsule by mouth daily., Disp: , Rfl:      omeprazole (PRILOSEC) 40 MG DR capsule, Take 1 capsule (40 mg) by mouth daily, Disp: 90 capsule, Rfl: 3     SUMAtriptan (IMITREX) 25 MG tablet, TAKE 1 TABLET BY MOUTH AT ONSET OF MIGRAINE. MAY REPEAT IN 2 HOURS IF NEEDED. MAX OF 2 PER DAY., Disp: 9 tablet, Rfl: 4     vitamin B complex with vitamin C (STRESS TAB) tablet, Take 1 tablet by mouth daily, Disp: , Rfl:      vitamin E 400 UNIT capsule, Take 1 capsule by mouth daily., Disp: , Rfl:     Family history:  No known family history of anesthesia problems    Social history:  Consumes alcohol socially.  Denies tobacco and illicit drug use.  Works for [employer].    Exam:  /74 (BP Location: Right arm, Patient Position: Sitting, Cuff Size: Adult Small)   Ht 1.664 m (5' 5.5\")   Wt 67.1 kg (148 lb)   LMP  (LMP Unknown)   BMI 24.25 kg/m    Body mass index is 24.25 kg/m .  General : Alert, cooperative, appears stated age   Skin: On the mid back just to the right of midline is a small blackhead opening and a 1 cm underlying fluctuance consistent with sebaceous cyst.    Assessment/Plan:   Amanda Sifuentes is a 57 year old female who sounds like had a recent infection with a sebaceous cyst.  It has resolved on its own.  She is interested in having this lesion removed to help prevent future complications.  We felt comfortable removing this in the office under local anesthetic.  Therefore, consent was obtained.  She was placed in the prone position.  The skin over the lesion on the right mid back was marked and then prepped with Betadine.  20 cc of 1% lidocaine was injected around the planned incision site.  An elliptical longitudinal incision was made which encompassed the small cyst opening.  Scalpel was " utilized to dissect into the subcutaneous fat and around the cyst.  The cyst itself measured about 4 mm.  This was discarded.  The incision was then closed with interrupted 3-0 Vicryl followed by coverage with Exofin glue.      It is okay to return to activities as tolerated.    Tylenol as needed for discomfort.    It is okay to shower as early as this evening.    Return to general surgery clinic as needed.     40 minutes was spent for this encounter for chart prep, discussion, documentation, and procedure.    Lisa Hinton DO  General Surgeon  [facility]  Surgery Clinic [address] [address]  Office: [phone]  Employed by - [facility]

## 2023-01-23 ENCOUNTER — ANCILLARY PROCEDURE (OUTPATIENT)
Dept: MAMMOGRAPHY | Facility: CLINIC | Age: 58
End: 2023-01-23
Attending: OBSTETRICS & GYNECOLOGY
Payer: COMMERCIAL

## 2023-01-23 DIAGNOSIS — R92.8 BI-RADS CATEGORY 3 MAMMOGRAM RESULT: ICD-10-CM

## 2023-01-23 PROCEDURE — 77065 DX MAMMO INCL CAD UNI: CPT | Mod: LT | Performed by: RADIOLOGY

## 2023-01-24 DIAGNOSIS — R92.8 BI-RADS CATEGORY 3 MAMMOGRAM RESULT: Primary | ICD-10-CM

## 2023-01-24 DIAGNOSIS — Z12.31 VISIT FOR SCREENING MAMMOGRAM: ICD-10-CM

## 2023-01-30 ENCOUNTER — MYC MEDICAL ADVICE (OUTPATIENT)
Dept: FAMILY MEDICINE | Facility: CLINIC | Age: 58
End: 2023-01-30
Payer: COMMERCIAL

## 2023-01-30 DIAGNOSIS — Z11.1 SCREENING EXAMINATION FOR PULMONARY TUBERCULOSIS: Primary | ICD-10-CM

## 2023-04-02 DIAGNOSIS — G43.809 OTHER MIGRAINE, NOT INTRACTABLE, WITHOUT STATUS MIGRAINOSUS: ICD-10-CM

## 2023-04-03 RX ORDER — SUMATRIPTAN 25 MG/1
TABLET, FILM COATED ORAL
Qty: 12 TABLET | Refills: 3 | Status: SHIPPED | OUTPATIENT
Start: 2023-04-03 | End: 2023-07-18

## 2023-04-17 ENCOUNTER — OFFICE VISIT (OUTPATIENT)
Dept: OBGYN | Facility: CLINIC | Age: 58
End: 2023-04-17
Payer: COMMERCIAL

## 2023-04-17 VITALS
HEART RATE: 78 BPM | BODY MASS INDEX: 22.82 KG/M2 | WEIGHT: 142 LBS | HEIGHT: 66 IN | SYSTOLIC BLOOD PRESSURE: 137 MMHG | DIASTOLIC BLOOD PRESSURE: 85 MMHG

## 2023-04-17 DIAGNOSIS — Z78.0 MENOPAUSE: Primary | ICD-10-CM

## 2023-04-17 DIAGNOSIS — Q51.820 DOUBLE CERVIX: ICD-10-CM

## 2023-04-17 DIAGNOSIS — Z12.4 ENCOUNTER FOR SCREENING FOR CERVICAL CANCER: ICD-10-CM

## 2023-04-17 PROCEDURE — 2894A GYNECOLOGIC CYTOLOGY: CPT | Mod: 59 | Performed by: OBSTETRICS & GYNECOLOGY

## 2023-04-17 PROCEDURE — 87624 HPV HI-RISK TYP POOLED RSLT: CPT | Mod: 59 | Performed by: OBSTETRICS & GYNECOLOGY

## 2023-04-17 PROCEDURE — G0145 SCR C/V CYTO,THINLAYER,RESCR: HCPCS | Performed by: OBSTETRICS & GYNECOLOGY

## 2023-04-17 PROCEDURE — 2894A HPV HIGH RISK TYPES DNA CERVICAL: CPT | Performed by: OBSTETRICS & GYNECOLOGY

## 2023-04-17 PROCEDURE — 99213 OFFICE O/P EST LOW 20 MIN: CPT | Performed by: OBSTETRICS & GYNECOLOGY

## 2023-04-17 RX ORDER — ESTRADIOL 0.5 MG/1
0.5 TABLET ORAL DAILY
Qty: 90 TABLET | Refills: 3 | Status: SHIPPED | OUTPATIENT
Start: 2023-04-17 | End: 2024-04-08

## 2023-04-17 NOTE — PATIENT INSTRUCTIONS
If you have labs or imaging done, the results will automatically release in [employer] without an interpretation.  Your health care professional will review those results and send an interpretation with recommendations as soon as possible, but this may be 1-3 business days.    If you have any questions regarding your visit, please contact your care team.     [employer] [facility]: [phone]  [facility] CLINIC HOURS TELEPHONE NUMBER       MD Laureen Reyes - Certified Medical Assistant     Jenniffer- MERRILL RN  Mare-CHELSEY Mcfadden-CHELSEY Rodriguez-  Dariela-     Monday- [place]  8:00 a.m - 5:00 p.m    Tuesday- Surgery        Thursday- [place]  8:00 a.m - 5:00 p.m.    Friday- [place]  7:30 a.m - 4:00 p.m. [facility]  [address]  [place] [address]  [phone] Fax  Imaging Scheduling [phone]    [facility]  [address] [place] [address] [address].  [place], [place] [address] [phone]    [facility]  [address], [place] [address] [phone] Fax  Imaging Scheduling [phone]     Urgent Care locations:  [facility] Monday-Friday  10 am - 8 pm  Saturday and Sunday   9 am - 5 pm  Monday-Friday   10 am - 8 pm  Saturday and Sunday   9 am - 5 pm   [phone]     **Surgeries** Our Surgery Schedulers will contact you to schedule. If you do not receive a call within 3 business days, please call [phone].    If you need a medication refill, please contact your pharmacy. Please allow 3 business days for your refill to be completed.    As always, thank you for trusting us with your healthcare needs!

## 2023-04-17 NOTE — PROGRESS NOTES
OB/GYN      NAME:  Amanda Sifuentes  PCP:  LorenzoDominiqueraquel Bookerdesmond  MRN:  [id number]    Impression / Plan     58 year old  with:      ICD-10-CM    1. 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 Encounter for screening for cervical cancer  Z12.4 Pap screen with HPV - recommended age 30 - 65 years     Pap screen with HPV - recommended age 30 - 65 years          Pap smear done per her request.  Normal exam findings today.  Patient elects to continue the estradiol and we will discuss stopping it at her next visit  Repeat breast imaging in 6 months  Recommend she avoid tanning bed use and recommend consistent sunscreen use.  She will continue to see her dermatologist.    Chief Complaint     Chief Complaint   Patient presents with     Gyn Exam     Breast Problem       HPI     Amanda Sifuentes is a  58 year old female who is seen for routine gyn exam.  I last saw the patient about a year ago.  We decreased her oral Estrace from 1 mg to 0.5 mg.  She has been doing well.  She has found benefit in following a healthier diet.  She no longer uses the gabapentin for night sweats.    Patient's mammogram was recently BI-RADS 3.  Additional imaging suggests benign etiology and 6-month follow-up is recommended.  Since she has that experience, she would like a Pap smear today.  Last Pap screening was 2 years ago, normal with negative high-risk HPV.  No history of abnormal Pap smear.  We discussed the cervical cancer screening guidelines.  Patient requests Pap for peace of mind.    No LMP recorded (lmp unknown). Patient has had a hysterectomy.  Supracervical        Problem List     Patient Active Problem List    Diagnosis Date Noted     Other type of migraine 2004     Priority: High     Diagnosis updated by automated process. Provider to review and confirm.        Esophageal reflux 11/23/2004     Priority: High     Borderline high cholesterol 07/19/2022     Priority: Medium     Thyroid nodule 08/06/2020     Priority: Medium     Cervical cancer screening      Priority: Medium     Date  Cervix A  Cervix B  12/27/12 NIL pap, neg HR HPV // NIL pap, neg HR HPV.  2/9/15  NIL pap, neg HR HPV. //  NIL pap, neg HR HPV. Plan: cotest in 5 years  4/9/18 NIL pap, neg HR HPV //  NIL pap, neg HR HPV. Plan: pap in 3 years.  7/15/21 NIL pap, neg HR HPV //  NIL pap, neg HR HPV. Plan: Cotest in 5 years.       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     Priority: Medium     Solar elastosis 07/07/2012     Priority: Medium     AK (actinic keratosis) 07/07/2012     Priority: Medium     History of hysterectomy for benign disease 12/27/2011     Priority: Medium     She had a supra-cervical hysterectomy of a bicornate uterus        Eczema 12/27/2011     Priority: Medium     Family history of colon cancer 12/29/2010     Priority: Medium     Grandparents (2) ages late 60's, and early 70's       CARDIOVASCULAR SCREENING; LDL GOAL LESS THAN 160 05/09/2010     Priority: Medium     Other acne 12/07/2007     Priority: Medium     Hemorrhoids 11/23/2004     Priority: Medium     Problem list name updated by automated process. Provider to review         Medications     Current Outpatient Medications   Medication     ascorbic acid (VITAMIN C) 1000 MG TABS     aspirin 81 MG EC tablet      "benzoyl peroxide 5 % topical gel     Calcium Carb-Cholecalciferol 600-800 MG-UNIT TABS     cimetidine (TAGAMET) 400 MG tablet     desonide (DESOWEN) 0.05 % external ointment     estradiol (ESTRACE) 0.5 MG tablet     GLUCOSAMINE CHONDROITIN OR TABS     MULTI-VITAMIN OR TABS     neomycin-polymyxin-dexamethasone (MAXITROL) 3.5-51984-6.1 ophthalmic ointment     nitroGLYcerin (NITRO-BID) 2 % OINT ointment     olopatadine (PATANOL) 0.1 % ophthalmic solution     omega-3 fatty acids 1200 MG capsule     omeprazole (PRILOSEC) 40 MG DR capsule     SUMAtriptan (IMITREX) 25 MG tablet     vitamin B complex with vitamin C (STRESS TAB) tablet     vitamin E 400 UNIT capsule     No current facility-administered medications for this visit.        Allergies     No Known Allergies    ROS     Pertinent positives and negatives are listed in the HPI.     Physical Exam   Vitals: /85 (BP Location: Right arm, Cuff Size: Adult Regular)   Pulse 78   Ht 1.664 m (5' 5.5\")   Wt 64.4 kg (142 lb)   LMP  (LMP Unknown)   BMI 23.27 kg/m      General: Comfortable, no obvious distress  Psych: Alert and orientated x 3. Appropriate affect, good insight.   Breast: Symmetrical, no discrete lesions, dimpling, or nipple discharge.  No lymphadenopathy.  : Normal female external genitalia.  No lesions.  Urethral meatus normal.  Speculum exam reveals a normal vaginal vault x2, normal cervix x2.  Longitudinal septum appreciated and starts a couple of centimeters proximal to the hymen. No abnormal discharge.  Bimanual exam reveals no palpable masses.     Extremities: No peripheral edema, nontender     Pap was done in the usual fashion x2          20 min spent on the date of the encounter in chart review, patient visit, review of tests, documentation  about the issues documented above.     Smiley Cuevas MD     "

## 2023-04-19 LAB
BKR LAB AP GYN ADEQUACY: NORMAL
BKR LAB AP GYN ADEQUACY: NORMAL
BKR LAB AP GYN INTERPRETATION: NORMAL
BKR LAB AP GYN INTERPRETATION: NORMAL
BKR LAB AP HPV REFLEX: NORMAL
BKR LAB AP HPV REFLEX: NORMAL
BKR LAB AP PREVIOUS ABNORMAL: NORMAL
BKR LAB AP PREVIOUS ABNORMAL: NORMAL
PATH REPORT.COMMENTS IMP SPEC: NORMAL
PATH REPORT.RELEVANT HX SPEC: NORMAL
PATH REPORT.RELEVANT HX SPEC: NORMAL

## 2023-04-21 ENCOUNTER — TELEPHONE (OUTPATIENT)
Dept: OBGYN | Facility: CLINIC | Age: 58
End: 2023-04-21
Payer: COMMERCIAL

## 2023-04-21 NOTE — TELEPHONE ENCOUNTER
Pt ladonna the pharmacy never received her refill of estridol. She only has 1 pill left. Please call: [phone] with any questions. She stated the pharmacy states their was no record of getting renewal.  [facility] [place].

## 2023-04-21 NOTE — TELEPHONE ENCOUNTER
Per medications tab, receipt confirmed by pharmacy 04/17 @ 4:39 PM.    Called [facility] - they just received the prescription today. Called patient to inform. No further questions at this time.  Camila Washington, CMA

## 2023-07-18 ENCOUNTER — OFFICE VISIT (OUTPATIENT)
Dept: FAMILY MEDICINE | Facility: CLINIC | Age: 58
End: 2023-07-18
Payer: COMMERCIAL

## 2023-07-18 ENCOUNTER — PATIENT OUTREACH (OUTPATIENT)
Dept: CARE COORDINATION | Facility: CLINIC | Age: 58
End: 2023-07-18

## 2023-07-18 ENCOUNTER — DOCUMENTATION ONLY (OUTPATIENT)
Dept: LAB | Facility: CLINIC | Age: 58
End: 2023-07-18

## 2023-07-18 VITALS
WEIGHT: 129 LBS | HEIGHT: 66 IN | OXYGEN SATURATION: 100 % | HEART RATE: 77 BPM | BODY MASS INDEX: 20.73 KG/M2 | DIASTOLIC BLOOD PRESSURE: 76 MMHG | TEMPERATURE: 98 F | SYSTOLIC BLOOD PRESSURE: 128 MMHG | RESPIRATION RATE: 16 BRPM

## 2023-07-18 DIAGNOSIS — Z13.29 SCREENING FOR THYROID DISORDER: ICD-10-CM

## 2023-07-18 DIAGNOSIS — H01.119 CONTACT AND ALLERGIC DERMATITIS OF EYELID: ICD-10-CM

## 2023-07-18 DIAGNOSIS — Z13.220 LIPID SCREENING: ICD-10-CM

## 2023-07-18 DIAGNOSIS — Z00.00 ROUTINE GENERAL MEDICAL EXAMINATION AT A HEALTH CARE FACILITY: Primary | ICD-10-CM

## 2023-07-18 DIAGNOSIS — G43.809 OTHER MIGRAINE, NOT INTRACTABLE, WITHOUT STATUS MIGRAINOSUS: ICD-10-CM

## 2023-07-18 DIAGNOSIS — H10.13 ALLERGIC CONJUNCTIVITIS OF BOTH EYES: ICD-10-CM

## 2023-07-18 DIAGNOSIS — Z13.820 SCREENING FOR OSTEOPOROSIS: ICD-10-CM

## 2023-07-18 DIAGNOSIS — Z86.39 HISTORY OF EARLY MENARCHE: ICD-10-CM

## 2023-07-18 DIAGNOSIS — L70.0 ACNE VULGARIS: ICD-10-CM

## 2023-07-18 DIAGNOSIS — K21.9 GASTROESOPHAGEAL REFLUX DISEASE WITHOUT ESOPHAGITIS: ICD-10-CM

## 2023-07-18 DIAGNOSIS — K64.9 HEMORRHOIDS, UNSPECIFIED HEMORRHOID TYPE: ICD-10-CM

## 2023-07-18 DIAGNOSIS — Z11.1 SCREENING EXAMINATION FOR PULMONARY TUBERCULOSIS: ICD-10-CM

## 2023-07-18 PROCEDURE — 99396 PREV VISIT EST AGE 40-64: CPT | Mod: 25 | Performed by: FAMILY MEDICINE

## 2023-07-18 PROCEDURE — 90471 IMMUNIZATION ADMIN: CPT | Performed by: FAMILY MEDICINE

## 2023-07-18 PROCEDURE — 86481 TB AG RESPONSE T-CELL SUSP: CPT | Performed by: FAMILY MEDICINE

## 2023-07-18 PROCEDURE — 99214 OFFICE O/P EST MOD 30 MIN: CPT | Mod: 25 | Performed by: FAMILY MEDICINE

## 2023-07-18 PROCEDURE — 36415 COLL VENOUS BLD VENIPUNCTURE: CPT | Performed by: FAMILY MEDICINE

## 2023-07-18 PROCEDURE — 90715 TDAP VACCINE 7 YRS/> IM: CPT | Performed by: FAMILY MEDICINE

## 2023-07-18 RX ORDER — OMEPRAZOLE 40 MG/1
40 CAPSULE, DELAYED RELEASE ORAL DAILY
Qty: 90 CAPSULE | Refills: 3 | Status: SHIPPED | OUTPATIENT
Start: 2023-07-18 | End: 2024-07-30

## 2023-07-18 RX ORDER — OLOPATADINE HYDROCHLORIDE 1 MG/ML
1 SOLUTION/ DROPS OPHTHALMIC 2 TIMES DAILY
Qty: 5 ML | Refills: 3 | Status: SHIPPED | OUTPATIENT
Start: 2023-07-18 | End: 2024-07-30

## 2023-07-18 RX ORDER — DESONIDE 0.5 MG/G
OINTMENT TOPICAL
Qty: 30 G | Refills: 1 | Status: SHIPPED | OUTPATIENT
Start: 2023-07-18 | End: 2024-07-30

## 2023-07-18 RX ORDER — SUMATRIPTAN 25 MG/1
TABLET, FILM COATED ORAL
Qty: 12 TABLET | Refills: 3 | Status: SHIPPED | OUTPATIENT
Start: 2023-07-18 | End: 2024-01-15

## 2023-07-18 RX ORDER — NITROGLYCERIN 20 MG/G
OINTMENT TOPICAL
Qty: 15 G | Refills: 1 | Status: SHIPPED | OUTPATIENT
Start: 2023-07-18 | End: 2024-07-30

## 2023-07-18 RX ORDER — CIMETIDINE 400 MG
400 TABLET ORAL AT BEDTIME
Qty: 90 TABLET | Refills: 3 | Status: SHIPPED | OUTPATIENT
Start: 2023-07-18 | End: 2024-07-30

## 2023-07-18 ASSESSMENT — ENCOUNTER SYMPTOMS
CONSTIPATION: 0
FEVER: 0
BREAST MASS: 1
COUGH: 0
ABDOMINAL PAIN: 0
DIZZINESS: 0
HEMATOCHEZIA: 0
HEADACHES: 0
PARESTHESIAS: 0
SHORTNESS OF BREATH: 0
NERVOUS/ANXIOUS: 0
HEMATURIA: 0
CHILLS: 0
PALPITATIONS: 0
FREQUENCY: 0
MYALGIAS: 0
NAUSEA: 0
DIARRHEA: 0
DYSURIA: 0
HEARTBURN: 0
WEAKNESS: 0
EYE PAIN: 0
ARTHRALGIAS: 0
JOINT SWELLING: 0
SORE THROAT: 0

## 2023-07-18 NOTE — PATIENT INSTRUCTIONS
TDAP today  Awaiting other labs  Noreen Ventura D.O.          Patient Education   Preventive Health Recommendations  Female Ages 50 - 64    Yearly exam: See your health care provider every year in order to  Review health changes.   Discuss preventive care.    Review your medicines if your doctor has prescribed any.    Get a Pap test every three years (unless you have an abnormal result and your provider advises testing more often).  If you get Pap tests with HPV test, you only need to test every 5 years, unless you have an abnormal result.   You do not need a Pap test if your uterus was removed (hysterectomy) and you have not had cancer.  You should be tested each year for STDs (sexually transmitted diseases) if you're at risk.   Have a mammogram every 1 to 2 years.  Have a colonoscopy at age 50, or have a yearly FIT test (stool test). These exams screen for colon cancer.    Have a cholesterol test every 5 years, or more often if advised.  Have a diabetes test (fasting glucose) every three years. If you are at risk for diabetes, you should have this test more often.   If you are at risk for osteoporosis (brittle bone disease), think about having a bone density scan (DEXA).    Shots: Get a flu shot each year. Get a tetanus shot every 10 years.    Nutrition:   Eat at least 5 servings of fruits and vegetables each day.  Eat whole-grain bread, whole-wheat pasta and brown rice instead of white grains and rice.  Get adequate Calcium and Vitamin D.     Lifestyle  Exercise at least 150 minutes a week (30 minutes a day, 5 days a week). This will help you control your weight and prevent disease.  Limit alcohol to one drink per day.  No smoking.   Wear sunscreen to prevent skin cancer.   See your dentist every six months for an exam and cleaning.  See your eye doctor every 1 to 2 years.

## 2023-07-18 NOTE — PROGRESS NOTES
This patient had a QTB just today 07/18/2023. You don't want another one tomorrow 07/19/2023 do you? Please delete if it is not needed. Thanks cameron

## 2023-07-18 NOTE — PROGRESS NOTES
SUBJECTIVE:   CC: Taty is an 58 year old who presents for preventive health visit.       7/18/2023     8:30 AM   Additional Questions   Roomed by festus     Healthy Habits:     Getting at least 3 servings of Calcium per day:  Yes    Bi-annual eye exam:  Yes    Dental care twice a year:  Yes    Sleep apnea or symptoms of sleep apnea:  None    Diet:  Regular (no restrictions)    Frequency of exercise:  4-5 days/week    Duration of exercise:  30-45 minutes    Taking medications regularly:  Yes    Medication side effects:  None    Additional concerns today:  No    Labs done before appt today, was fasting   Medication refills requested, pended for two pharmacies, one for compounding   OBGYN exam done earlier in 2023.    1/19-colon cancer  menopause 26 -was hormones    GI has seen -stable on curent meds    Social History     Tobacco Use     Smoking status: Never     Smokeless tobacco: Never   Substance Use Topics     Alcohol use: Yes     Comment: 3 drinks per wek              7/18/2023     8:32 AM   Alcohol Use   Prescreen: >3 drinks/day or >7 drinks/week? No     Reviewed orders with patient.  Reviewed health maintenance and updated orders accordingly - Yes  BP Readings from Last 3 Encounters:   07/18/23 128/76   04/17/23 137/85   11/10/22 138/74    Wt Readings from Last 3 Encounters:   07/18/23 58.5 kg (129 lb)   04/17/23 64.4 kg (142 lb)   11/10/22 67.1 kg (148 lb)                    Breast Cancer Screening:  Any new diagnosis of family breast, ovarian, or bowel cancer? no      FHS-7:       7/19/2021     6:45 AM 7/19/2022     7:05 AM 7/19/2022     8:36 AM 1/23/2023     8:05 AM 7/18/2023     8:32 AM   Breast CA Risk Assessment (FHS-7)   Did any of your first-degree relatives have breast or ovarian cancer? No No No No No   Did any of your relatives have bilateral breast cancer? No No No No No   Did any man in your family have breast cancer? No No No No No   Did any woman in your family have breast and ovarian cancer?  No No No No No   Did any woman in your family have breast cancer before age 50 y? No No No No No   Do you have 2 or more relatives with breast and/or ovarian cancer? No No No No No   Do you have 2 or more relatives with breast and/or bowel cancer? No No No No No     click delete button to remove this line now  Mammogram Screening: Recommended mammography every 1-2 years with patient discussion and risk factor consideration  Pertinent mammograms are reviewed under the imaging tab.    History of abnormal Pap smear: NO - age 30-65 PAP every 5 years with negative HPV co-testing recommended      Latest Ref Rng & Units 4/17/2023     4:42 PM 7/19/2021     9:50 AM 4/9/2018    12:06 PM   PAP / HPV   PAP  Negative for Intraepithelial Lesion or Malignancy (NILM)     Negative for Intraepithelial Lesion or Malignancy (NILM)  Negative for Intraepithelial Lesion or Malignancy (NILM)     Negative for Intraepithelial Lesion or Malignancy (NILM)     PAP (Historical)    NIL     NIL    HPV 16 DNA Negative  Negative Negative     Negative  Negative     Negative     HPV 18 DNA Negative  Negative Negative     Negative  Negative     Negative     Other HR HPV Negative  Negative Negative     Negative  Negative     Negative       Reviewed and updated as needed this visit by clinical staff   Tobacco  Allergies               Reviewed and updated as needed this visit by Provider                 Past Medical History:   Diagnosis Date     Esophageal reflux      Family history of colon cancer      Globus sensation      Multiple thyroid nodules     2009, 2013 FNAB benign     Other forms of migraine, without mention of intractable migraine without mention of status migrainosus      Unspecified hemorrhoids without mention of complication       Past Surgical History:   Procedure Laterality Date     COLONOSCOPY  05/20/2013    Procedure: COLONOSCOPY;;  Surgeon: Jim Flores MD;  Location:  GI     COLONOSCOPY N/A 01/11/2019    Procedure:  COLONOSCOPY;  Surgeon: Luisito Barnard MD;  Location: [facility]     HC EXCISION BREAST LESION, OPEN >=1      benign     HEMORRHOID SURGERY       HYSTERECTOMY, PAP STILL INDICATED      benign/with both ovaries(cervix in)     REPAIR PTOSIS       OB History    Para Term  AB Living   0 0 0 0 0 0   SAB IAB Ectopic Multiple Live Births   0 0 0 0 0       Review of Systems   Constitutional: Negative for chills and fever.   HENT: Negative for congestion, ear pain, hearing loss and sore throat.    Eyes: Negative for pain and visual disturbance.   Respiratory: Negative for cough and shortness of breath.    Cardiovascular: Negative for chest pain, palpitations and peripheral edema.   Gastrointestinal: Negative for abdominal pain, constipation, diarrhea, heartburn, hematochezia and nausea.   Breasts:  Positive for breast mass. Negative for tenderness and discharge.   Genitourinary: Negative for dysuria, frequency, genital sores, hematuria, pelvic pain, urgency, vaginal bleeding and vaginal discharge.   Musculoskeletal: Negative for arthralgias, joint swelling and myalgias.   Skin: Negative for rash.   Neurological: Negative for dizziness, weakness, headaches and paresthesias.   Psychiatric/Behavioral: Negative for mood changes. The patient is not nervous/anxious.      CONSTITUTIONAL: NEGATIVE for fever, chills, change in weight  INTEGUMENTARU/SKIN: NEGATIVE for worrisome rashes, moles or lesions  EYES: NEGATIVE for vision changes or irritation  ENT: NEGATIVE for ear, mouth and throat problems  RESP: NEGATIVE for significant cough or SOB  BREAST: NEGATIVE for masses, tenderness or discharge  CV: NEGATIVE for chest pain, palpitations or peripheral edema  GI: NEGATIVE for nausea, abdominal pain, heartburn, or change in bowel habits  : NEGATIVE for unusual urinary or vaginal symptoms. Periods are regular.  MUSCULOSKELETAL: NEGATIVE for significant arthralgias or myalgia  NEURO: NEGATIVE for weakness, dizziness or  "paresthesias  PSYCHIATRIC: NEGATIVE for changes in mood or affect     OBJECTIVE:   /76   Pulse 77   Temp 98  F (36.7  C) (Oral)   Resp 16   Ht 1.664 m (5' 5.5\")   Wt 58.5 kg (129 lb)   LMP  (LMP Unknown)   SpO2 100%   BMI 21.14 kg/m    Physical Exam  GENERAL: healthy, alert and no distress  EYES: Eyes grossly normal to inspection, PERRL and conjunctivae and sclerae normal  HENT: ear canals and TM's normal, nose and mouth without ulcers or lesions  NECK: no adenopathy, no asymmetry, masses, or scars and thyroid normal to palpation  Breast exam-declined  RESP: lungs clear to auscultation - no rales, rhonchi or wheezes  CV: regular rate and rhythm, normal S1 S2, no S3 or S4, no murmur, click or rub, no peripheral edema and peripheral pulses strong  ABDOMEN: soft, nontender, no hepatosplenomegaly, no masses and bowel sounds normal   -declilned  MS: no gross musculoskeletal defects noted, no edema  SKIN: no suspicious lesions or rashes  NEURO: Normal strength and tone, mentation intact and speech normal  PSYCH: mentation appears normal, affect normal/bright    Diagnostic Test Results:  Labs reviewed in Epic    ASSESSMENT/PLAN:       ICD-10-CM    1. Routine general medical examination at a health care facility  Z00.00 Lipid panel reflex to direct LDL Fasting     Comprehensive metabolic panel (BMP + Alb, Alk Phos, ALT, AST, Total. Bili, TP)     CBC with platelets and differential      2. Screening examination for pulmonary tuberculosis  Z11.1 Quantiferon TB Gold Plus     CANCELED: Quantiferon TB Gold Plus      3. Other migraine, not intractable, without status migrainosus  G43.809 SUMAtriptan (IMITREX) 25 MG tablet     Comprehensive metabolic panel (BMP + Alb, Alk Phos, ALT, AST, Total. Bili, TP)      4. Gastroesophageal reflux disease without esophagitis  K21.9 cimetidine (TAGAMET) 400 MG tablet     omeprazole (PRILOSEC) 40 MG DR capsule     Comprehensive metabolic panel (BMP + Alb, Alk Phos, ALT, AST, " Total. Bili, TP)      5. Contact and allergic dermatitis of eyelid  H01.119       6. Allergic conjunctivitis of both eyes  H10.13 olopatadine (PATANOL) 0.1 % ophthalmic solution      7. Hemorrhoids, unspecified hemorrhoid type  K64.9 nitroGLYcerin (NITRO-BID) 2 % OINT ointment      8. Acne vulgaris  L70.0 desonide (DESOWEN) 0.05 % external ointment      9. History of early menarche  Z86.39 DX Hip/Pelvis/Spine      10. Screening for osteoporosis  Z13.820 DX Hip/Pelvis/Spine      11. Lipid screening  Z13.220 Lipid panel reflex to direct LDL Fasting      12. Screening for thyroid disorder  Z13.29 TSH with free T4 reflex        History of reflux-stable on her medication.  She has had endoscopy which was stable.  She will be due for colonoscopy next year    History of allergies-controlled with medication.    History of dermatitis-uses steroid cream as needed.  Risks and benefits of steroid cream with long-term discussed.  She will use cream sparingly.    Hemorrhoids-stable would like a refill on her glycerin as needed use.  3 today.    Headaches-controlled per patient.  Uses as needed Imitrex and refill today.  Benefits of medication reviewed.    Patient with history of early menopause-patient does follow with OB/GYN.  She has been on long term estrogen supplement.  We did discuss the use of getting a bone density screening at this age.  Repeat at 63 if needed.  Continue vitamin D as planned.    Patient has been advised of split billing requirements and indicates understanding: Yes      COUNSELING:  Reviewed preventive health counseling, as reflected in patient instructions       Regular exercise       Healthy diet/nutrition       Vision screening       Hearing screening       Aspirin prophylaxis       Alcohol Use       Contraception       Family planning        She reports that she has never smoked. She has never used smokeless tobacco.      Noreen Ventura DO  [facility]

## 2023-07-19 ENCOUNTER — LAB (OUTPATIENT)
Dept: LAB | Facility: CLINIC | Age: 58
End: 2023-07-19
Payer: COMMERCIAL

## 2023-07-19 DIAGNOSIS — Z13.220 LIPID SCREENING: ICD-10-CM

## 2023-07-19 DIAGNOSIS — Z00.00 ROUTINE GENERAL MEDICAL EXAMINATION AT A HEALTH CARE FACILITY: ICD-10-CM

## 2023-07-19 DIAGNOSIS — K21.9 GASTROESOPHAGEAL REFLUX DISEASE WITHOUT ESOPHAGITIS: ICD-10-CM

## 2023-07-19 DIAGNOSIS — Z13.29 SCREENING FOR THYROID DISORDER: ICD-10-CM

## 2023-07-19 DIAGNOSIS — G43.809 OTHER MIGRAINE, NOT INTRACTABLE, WITHOUT STATUS MIGRAINOSUS: ICD-10-CM

## 2023-07-19 LAB
ALBUMIN SERPL BCG-MCNC: 4.6 G/DL (ref 3.5–5.2)
ALP SERPL-CCNC: 66 U/L (ref 35–104)
ALT SERPL W P-5'-P-CCNC: 17 U/L (ref 0–50)
ANION GAP SERPL CALCULATED.3IONS-SCNC: 10 MMOL/L (ref 7–15)
AST SERPL W P-5'-P-CCNC: 27 U/L (ref 0–45)
BASOPHILS # BLD AUTO: 0 10E3/UL (ref 0–0.2)
BASOPHILS NFR BLD AUTO: 1 %
BILIRUB SERPL-MCNC: 0.3 MG/DL
BUN SERPL-MCNC: 13 MG/DL (ref 8–23)
CALCIUM SERPL-MCNC: 9.8 MG/DL (ref 8.8–10.2)
CHLORIDE SERPL-SCNC: 106 MMOL/L (ref 98–107)
CHOLEST SERPL-MCNC: 265 MG/DL
CREAT SERPL-MCNC: 0.76 MG/DL (ref 0.51–0.95)
DEPRECATED HCO3 PLAS-SCNC: 26 MMOL/L (ref 22–29)
EOSINOPHIL # BLD AUTO: 0.1 10E3/UL (ref 0–0.7)
EOSINOPHIL NFR BLD AUTO: 2 %
ERYTHROCYTE [DISTWIDTH] IN BLOOD BY AUTOMATED COUNT: 12.1 % (ref 10–15)
GFR SERPL CREATININE-BSD FRML MDRD: 89 ML/MIN/1.73M2
GLUCOSE SERPL-MCNC: 87 MG/DL (ref 70–99)
HCT VFR BLD AUTO: 39.9 % (ref 35–47)
HDLC SERPL-MCNC: 74 MG/DL
HGB BLD-MCNC: 13.1 G/DL (ref 11.7–15.7)
IMM GRANULOCYTES # BLD: 0 10E3/UL
IMM GRANULOCYTES NFR BLD: 0 %
LDLC SERPL CALC-MCNC: 180 MG/DL
LYMPHOCYTES # BLD AUTO: 1.3 10E3/UL (ref 0.8–5.3)
LYMPHOCYTES NFR BLD AUTO: 27 %
MCH RBC QN AUTO: 30.2 PG (ref 26.5–33)
MCHC RBC AUTO-ENTMCNC: 32.8 G/DL (ref 31.5–36.5)
MCV RBC AUTO: 92 FL (ref 78–100)
MONOCYTES # BLD AUTO: 0.7 10E3/UL (ref 0–1.3)
MONOCYTES NFR BLD AUTO: 14 %
NEUTROPHILS # BLD AUTO: 2.6 10E3/UL (ref 1.6–8.3)
NEUTROPHILS NFR BLD AUTO: 56 %
NONHDLC SERPL-MCNC: 191 MG/DL
PLATELET # BLD AUTO: 294 10E3/UL (ref 150–450)
POTASSIUM SERPL-SCNC: 4.2 MMOL/L (ref 3.4–5.3)
PROT SERPL-MCNC: 7.2 G/DL (ref 6.4–8.3)
RBC # BLD AUTO: 4.34 10E6/UL (ref 3.8–5.2)
SODIUM SERPL-SCNC: 142 MMOL/L (ref 136–145)
TRIGL SERPL-MCNC: 54 MG/DL
TSH SERPL DL<=0.005 MIU/L-ACNC: 2.51 UIU/ML (ref 0.3–4.2)
WBC # BLD AUTO: 4.7 10E3/UL (ref 4–11)

## 2023-07-19 PROCEDURE — 85025 COMPLETE CBC W/AUTO DIFF WBC: CPT

## 2023-07-19 PROCEDURE — 80053 COMPREHEN METABOLIC PANEL: CPT

## 2023-07-19 PROCEDURE — 84443 ASSAY THYROID STIM HORMONE: CPT

## 2023-07-19 PROCEDURE — 80061 LIPID PANEL: CPT

## 2023-07-19 PROCEDURE — 36415 COLL VENOUS BLD VENIPUNCTURE: CPT

## 2023-07-19 NOTE — LETTER
"July 24, 2023      Taty Sifuentes  [address] [place] [phone]        Dear ,    We are writing to inform you of your test results.    Your cholesterol is abnormal, please use the recommendations below and recheck labs in 6-12 months.     Ways to improve your cholesterol...     1- Eats less saturated fats (including avoiding \"trans\" fats).     2 - Eat more unsaturated fats  - found in vegetables, grains, and tree nuts.   Also by replacing butter with canola oil or olive oil.     3 - Eat more nuts. 1-2 ounces (a small handful) of almonds, walnuts, hazelnuts or pecans once a day in place of other less healthy snacks.     4 - Eat more high fiber foods - vegetables and whole grains including oat bran, oats, beans, peas, and flax seed.     5 - Eat more fish - such as salmon, tuna, mackerel, and sardines.  1 or 2 six ounce servings per week is a healthy replacement for other proteins.     6 - Exercise for at least 120 minutes per week - which is equal to 30 minutes 4 days per week.     Resulted Orders   Lipid panel reflex to direct LDL Fasting   Result Value Ref Range    Cholesterol 265 (H) <200 mg/dL    Triglycerides 54 <150 mg/dL    Direct Measure HDL 74 >=50 mg/dL    LDL Cholesterol Calculated 180 (H) <=100 mg/dL    Non HDL Cholesterol 191 (H) <130 mg/dL    Narrative    Cholesterol  Desirable:  <200 mg/dL    Triglycerides  Normal:  Less than 150 mg/dL  Borderline High:  150-199 mg/dL  High:  200-499 mg/dL  Very High:  Greater than or equal to 500 mg/dL    Direct Measure HDL  Female:  Greater than or equal to 50 mg/dL   Male:  Greater than or equal to 40 mg/dL    LDL Cholesterol  Desirable:  <100mg/dL  Above Desirable:  100-129 mg/dL   Borderline High:  130-159 mg/dL   High:  160-189 mg/dL   Very High:  >= 190 mg/dL    Non HDL Cholesterol  Desirable:  130 mg/dL  Above Desirable:  130-159 mg/dL  Borderline High:  160-189 mg/dL  High:  190-219 mg/dL  Very High:  Greater than or equal to 220 mg/dL "   Comprehensive metabolic panel (BMP + Alb, Alk Phos, ALT, AST, Total. Bili, TP)   Result Value Ref Range    Sodium 142 136 - 145 mmol/L    Potassium 4.2 3.4 - 5.3 mmol/L    Chloride 106 98 - 107 mmol/L    Carbon Dioxide (CO2) 26 22 - 29 mmol/L    Anion Gap 10 7 - 15 mmol/L    Urea Nitrogen 13.0 8.0 - 23.0 mg/dL    Creatinine 0.76 0.51 - 0.95 mg/dL    Calcium 9.8 8.8 - 10.2 mg/dL    Glucose 87 70 - 99 mg/dL    Alkaline Phosphatase 66 35 - 104 U/L    AST 27 0 - 45 U/L      Comment:      Reference intervals for this test were updated on 6/12/2023 to more accurately reflect our healthy population. There may be differences in the flagging of prior results with similar values performed with this method. Interpretation of those prior results can be made in the context of the updated reference intervals.    ALT 17 0 - 50 U/L      Comment:      Reference intervals for this test were updated on 6/12/2023 to more accurately reflect our healthy population. There may be differences in the flagging of prior results with similar values performed with this method. Interpretation of those prior results can be made in the context of the updated reference intervals.      Protein Total 7.2 6.4 - 8.3 g/dL    Albumin 4.6 3.5 - 5.2 g/dL    Bilirubin Total 0.3 <=1.2 mg/dL    GFR Estimate 89 >60 mL/min/1.73m2   TSH with free T4 reflex   Result Value Ref Range    TSH 2.51 0.30 - 4.20 uIU/mL   CBC with platelets and differential   Result Value Ref Range    WBC Count 4.7 4.0 - 11.0 10e3/uL    RBC Count 4.34 3.80 - 5.20 10e6/uL    Hemoglobin 13.1 11.7 - 15.7 g/dL    Hematocrit 39.9 35.0 - 47.0 %    MCV 92 78 - 100 fL    MCH 30.2 26.5 - 33.0 pg    MCHC 32.8 31.5 - 36.5 g/dL    RDW 12.1 10.0 - 15.0 %    Platelet Count 294 150 - 450 10e3/uL    % Neutrophils 56 %    % Lymphocytes 27 %    % Monocytes 14 %    % Eosinophils 2 %    % Basophils 1 %    % Immature Granulocytes 0 %    Absolute Neutrophils 2.6 1.6 - 8.3 10e3/uL    Absolute Lymphocytes 1.3  0.8 - 5.3 10e3/uL    Absolute Monocytes 0.7 0.0 - 1.3 10e3/uL    Absolute Eosinophils 0.1 0.0 - 0.7 10e3/uL    Absolute Basophils 0.0 0.0 - 0.2 10e3/uL    Absolute Immature Granulocytes 0.0 <=0.4 10e3/uL       If you have any questions or concerns, please call the clinic at the number listed above.       Sincerely,      Noreen Ventura, DO

## 2023-07-20 LAB
GAMMA INTERFERON BACKGROUND BLD IA-ACNC: 0 IU/ML
M TB IFN-G BLD-IMP: NEGATIVE
M TB IFN-G CD4+ BCKGRND COR BLD-ACNC: 10 IU/ML
MITOGEN IGNF BCKGRD COR BLD-ACNC: 0 IU/ML
MITOGEN IGNF BCKGRD COR BLD-ACNC: 0 IU/ML
QUANTIFERON MITOGEN: 10 IU/ML
QUANTIFERON NIL TUBE: 0 IU/ML
QUANTIFERON TB1 TUBE: 0 IU/ML
QUANTIFERON TB2 TUBE: 0

## 2023-07-20 NOTE — RESULT ENCOUNTER NOTE
All your results are essentially priscilla. Please contact me if you have any questions.  Take care,  Noreen Ventura D.O.

## 2023-07-24 ENCOUNTER — ANCILLARY PROCEDURE (OUTPATIENT)
Dept: MAMMOGRAPHY | Facility: CLINIC | Age: 58
End: 2023-07-24
Attending: OBSTETRICS & GYNECOLOGY
Payer: COMMERCIAL

## 2023-07-24 DIAGNOSIS — R92.8 BI-RADS CATEGORY 3 MAMMOGRAM RESULT: ICD-10-CM

## 2023-07-24 PROCEDURE — G0279 TOMOSYNTHESIS, MAMMO: HCPCS

## 2023-07-24 PROCEDURE — 77066 DX MAMMO INCL CAD BI: CPT

## 2023-07-26 ENCOUNTER — ANCILLARY PROCEDURE (OUTPATIENT)
Dept: BONE DENSITY | Facility: CLINIC | Age: 58
End: 2023-07-26
Attending: FAMILY MEDICINE
Payer: COMMERCIAL

## 2023-07-26 DIAGNOSIS — Z86.39 HISTORY OF EARLY MENARCHE: ICD-10-CM

## 2023-07-26 DIAGNOSIS — Z13.820 SCREENING FOR OSTEOPOROSIS: ICD-10-CM

## 2023-07-26 PROCEDURE — 77080 DXA BONE DENSITY AXIAL: CPT | Performed by: INTERNAL MEDICINE

## 2023-07-28 NOTE — RESULT ENCOUNTER NOTE
All your results show low bone density, discuss with your endocrinologist about options of treatment.  Please contact me if you have any questions.  Take care,  Noreen Ventura D.O.

## 2023-08-01 ENCOUNTER — PATIENT OUTREACH (OUTPATIENT)
Dept: CARE COORDINATION | Facility: CLINIC | Age: 58
End: 2023-08-01
Payer: COMMERCIAL

## 2023-08-10 ENCOUNTER — TRANSFERRED RECORDS (OUTPATIENT)
Dept: HEALTH INFORMATION MANAGEMENT | Facility: CLINIC | Age: 58
End: 2023-08-10
Payer: COMMERCIAL

## 2023-10-04 ENCOUNTER — IMMUNIZATION (OUTPATIENT)
Dept: FAMILY MEDICINE | Facility: CLINIC | Age: 58
End: 2023-10-04
Payer: COMMERCIAL

## 2023-10-04 DIAGNOSIS — Z23 NEED FOR PROPHYLACTIC VACCINATION AND INOCULATION AGAINST INFLUENZA: Primary | ICD-10-CM

## 2023-10-04 PROCEDURE — 99207 PR NO CHARGE NURSE ONLY: CPT

## 2023-10-04 PROCEDURE — 90471 IMMUNIZATION ADMIN: CPT

## 2023-10-04 PROCEDURE — 90682 RIV4 VACC RECOMBINANT DNA IM: CPT

## 2023-10-10 ENCOUNTER — IMMUNIZATION (OUTPATIENT)
Dept: FAMILY MEDICINE | Facility: CLINIC | Age: 58
End: 2023-10-10
Payer: COMMERCIAL

## 2023-10-10 DIAGNOSIS — Z23 ENCOUNTER FOR IMMUNIZATION: Primary | ICD-10-CM

## 2023-10-10 PROCEDURE — 90480 ADMN SARSCOV2 VAC 1/ONLY CMP: CPT

## 2023-10-10 PROCEDURE — 99207 PR NO CHARGE NURSE ONLY: CPT

## 2023-10-10 PROCEDURE — 91320 SARSCV2 VAC 30MCG TRS-SUC IM: CPT

## 2023-10-10 NOTE — PROGRESS NOTES
Prior to immunization administration, verified patients identity using patient s name and date of birth. Please see Immunization Activity for additional information.     Screening Questionnaire for Adult Immunization    Are you sick today?   No   Do you have allergies to medications, food, a vaccine component or latex?   No   Have you ever had a serious reaction after receiving a vaccination?   No   Do you have a long-term health problem with heart, lung, kidney, or metabolic disease (e.g., diabetes), asthma, a blood disorder, no spleen, complement component deficiency, a cochlear implant, or a spinal fluid leak?  Are you on long-term aspirin therapy?   No   Do you have cancer, leukemia, HIV/AIDS, or any other immune system problem?   No   Do you have a parent, brother, or sister with an immune system problem?   No   In the past 3 months, have you taken medications that affect  your immune system, such as prednisone, other steroids, or anticancer drugs; drugs for the treatment of rheumatoid arthritis, Crohn s disease, or psoriasis; or have you had radiation treatments?   No   Have you had a seizure, or a brain or other nervous system problem?   No   During the past year, have you received a transfusion of blood or blood    products, or been given immune (gamma) globulin or antiviral drug?   No   For women: Are you pregnant or is there a chance you could become       pregnant during the next month?   No   Have you received any vaccinations in the past 4 weeks?   No     Immunization questionnaire answers were all negative.    I have reviewed the following standing orders:   This patient is due and qualifies for the Covid-19 vaccine.     Click here for COVID-19 Standing Order    I have reviewed the vaccines inclusion and exclusion criteria; No concerns regarding eligibility.     Patient instructed to remain in clinic for 15 minutes afterwards, and to report any adverse reactions.     Screening performed by Alexa Sparks  ANGELA Chau on 10/10/2023 at 8:30 AM.

## 2024-01-14 DIAGNOSIS — G43.809 OTHER MIGRAINE, NOT INTRACTABLE, WITHOUT STATUS MIGRAINOSUS: ICD-10-CM

## 2024-01-15 RX ORDER — SUMATRIPTAN 25 MG/1
TABLET, FILM COATED ORAL
Qty: 12 TABLET | Refills: 1 | Status: SHIPPED | OUTPATIENT
Start: 2024-01-15 | End: 2024-07-02

## 2024-04-08 ENCOUNTER — MYC MEDICAL ADVICE (OUTPATIENT)
Dept: OBGYN | Facility: CLINIC | Age: 59
End: 2024-04-08
Payer: COMMERCIAL

## 2024-04-08 DIAGNOSIS — Z78.0 MENOPAUSE: ICD-10-CM

## 2024-04-08 RX ORDER — ESTRADIOL 0.5 MG/1
0.5 TABLET ORAL DAILY
Qty: 30 TABLET | Refills: 0 | Status: SHIPPED | OUTPATIENT
Start: 2024-04-08 | End: 2024-04-19

## 2024-04-08 NOTE — TELEPHONE ENCOUNTER
Requested Prescriptions   Pending Prescriptions Disp Refills    estradiol (ESTRACE) 0.5 MG tablet 90 tablet 3     Sig: Take 1 tablet (0.5 mg) by mouth daily Patient needs a follow-up appointment to discuss this medication due to her elevated blood pressures.       Hormone Replacement Therapy Passed - 4/8/2024  7:59 AM        Passed - Blood pressure under 140/90 in past 12 months     BP Readings from Last 3 Encounters:   07/18/23 128/76   04/17/23 137/85   11/10/22 138/74       No data recorded            Passed - Recent (12 mo) or future (30 days) visit within the authorizing provider's specialty     The patient must have completed an in-person or virtual visit within the past 12 months or has a future visit scheduled within the next 90 days with the authorizing provider s specialty.  Urgent care and e-visits do not quality as an office visit for this protocol.          Passed - Patient has mammogram in past 2 years on file if age 50-75        Passed - Medication is active on med list        Passed - Patient is 18 years of age or older        Passed - No active pregnancy on record        Passed - No positive pregnancy test on record in past 12 months           Prescription approved per [facility] Refill Protocol.  Pt is scheduled to see Dr. Cuevas on 4/19.    Mare Mccall RN

## 2024-04-19 ENCOUNTER — OFFICE VISIT (OUTPATIENT)
Dept: OBGYN | Facility: CLINIC | Age: 59
End: 2024-04-19
Payer: COMMERCIAL

## 2024-04-19 VITALS
SYSTOLIC BLOOD PRESSURE: 133 MMHG | HEIGHT: 66 IN | WEIGHT: 128.7 LBS | BODY MASS INDEX: 20.68 KG/M2 | DIASTOLIC BLOOD PRESSURE: 84 MMHG

## 2024-04-19 DIAGNOSIS — Q51.820 DOUBLE CERVIX: ICD-10-CM

## 2024-04-19 DIAGNOSIS — Z79.890 POSTMENOPAUSAL HORMONE THERAPY: ICD-10-CM

## 2024-04-19 DIAGNOSIS — Z01.419 WELL FEMALE EXAM WITH ROUTINE GYNECOLOGICAL EXAM: Primary | ICD-10-CM

## 2024-04-19 PROCEDURE — 99214 OFFICE O/P EST MOD 30 MIN: CPT | Performed by: OBSTETRICS & GYNECOLOGY

## 2024-04-19 RX ORDER — ESTRADIOL 0.5 MG/1
TABLET ORAL
Qty: 90 TABLET | Refills: 3 | Status: SHIPPED | OUTPATIENT
Start: 2024-04-19 | End: 2024-05-14

## 2024-04-19 NOTE — PATIENT INSTRUCTIONS
If you have labs or imaging done, the results will automatically release in [employer] without an interpretation.  Your health care professional will review those results and send an interpretation with recommendations as soon as possible, but this may be 1-3 business days.    If you have any questions regarding your visit, please contact your care team.     [employer] [facility]: [phone]  [facility] CLINIC HOURS TELEPHONE NUMBER       MD Laureen Reyes - Certified Medical Assistant     Jenniffer- LEAD RN  Mare-CHELSEY Barraza-CHELSEY Rodriguez-  Dariela-     Monday- [place]  8:00 a.m - 5:00 p.m    Tuesday- Surgery        Thursday- [place]  8:00 a.m - 5:00 p.m.    Friday- [place]  7:30 a.m - 4:00 p.m. [facility]  [address]  [place], [place] [address] [phone] Fax  [phone] Phone  Imaging Scheduling [phone]    [facility] Labor and Delivery  [address] [place] [address] Dr.  [place], [place] [address] [phone]    [facility]  [address], [place] [id number] [phone] Phone  [phone] Fax  Imaging Scheduling [phone]     Urgent Care locations:  [facility] Monday-Friday  10 am - 8 pm  Saturday and Sunday   9 am - 5 pm  Monday-Friday   10 am - 8 pm  Saturday and Sunday   9 am - 5 pm   [phone]     **Surgeries** Our Surgery Schedulers will contact you to schedule. If you do not receive a call within 3 business days, please call [phone].    If you need a medication refill, please contact your pharmacy. Please allow 3 business days for your refill to be completed.    As always, thank you for trusting us with your healthcare needs!

## 2024-04-19 NOTE — PROGRESS NOTES
OB/GYN      NAME:  Amanda Sifuentes  PCP:  Noreen Ventura  MRN:  [id number]    Impression / Plan     59 year old  with:      ICD-10-CM    1. Well female exam with routine gynecological exam  Z01.419       2. 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 Postmenopausal hormone therapy  Z79.890           Discussed risks of continued hormone therapy.  Patient understands the risks and would like to continue.  Discuss weaning to half of a tablet daily and patient is not ready for that yet.  Recommend she consider Divigel or other transdermal.plan to continue pills for now and follow up in one year, sooner as needed.  Recommend she continue to optimize her cardiovascular health with a plant-based diet and regular exercise. Refills given  Discussed management of genitourinary syndrome of menopause if it becomes a problem    Chief Complaint     Chief Complaint   Patient presents with    Gyn Exam       HPI     Amanda Sifuentes is a  59 year old female who is seen for annual gyn exam.      She has been taking estradiol tablets for hormone therapy.  She would like to continue   Patient's mother is 82 and still on hormone therapy for hot flashes.    Patient has not tolerated the estrogen patch in the past.      No LMP recorded (lmp unknown). Patient has had a hysterectomy.     She has been on hormone therapy for 12 years.     Tobacco use: No  Has patient been treated for high blood pressure: No  Systolic blood pressure today: 133  diabetes:  No  cholesterol lowering medication: No  Most recent cholesterol level: 265  HDL: 74      Problem List     Patient Active Problem List    Diagnosis Date Noted    Other type of migraine 2004     Priority: High     Diagnosis updated by automated process. Provider to review and confirm.      Esophageal reflux 2004     Priority: High    Borderline high cholesterol 2022     Priority: Medium    Thyroid nodule 2020     Priority: Medium    Cervical cancer  screening      Priority: Medium     Date  Cervix A  Cervix B  12/27/12 NIL pap, neg HR HPV // NIL pap, neg HR HPV.  2/9/15  NIL pap, neg HR HPV. //  NIL pap, neg HR HPV. Plan: cotest in 5 years  4/9/18 NIL pap, neg HR HPV //  NIL pap, neg HR HPV. Plan: pap in 3 years.  7/15/21 NIL pap, neg HR HPV //  NIL pap, neg HR HPV. Plan: Cotest in 5 years.  4/21/23 NIL pap, neg HR HPV // NIL pap, neg HR HPV. Plan: routine screening      Thyroid with heterogeneous echotexture determined by ultrasound 11/22/2016     Priority: Medium    Thyroid cyst 11/22/2016     Priority: Medium    History of irradiation, presenting hazards to health 11/22/2016     Priority: Medium    Double cervix 10/31/2016     Priority: Medium    Porokeratosis 02/10/2015     Priority: Medium    Actinic keratosis 02/09/2015     Priority: Medium    Hormone replacement therapy (postmenopausal) 12/30/2012     Priority: Medium    Hyperlipidemia LDL goal <160 12/27/2012     Priority: Medium    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     Priority: Medium    Solar elastosis 07/07/2012     Priority: Medium    AK (actinic keratosis) 07/07/2012     Priority: Medium    History of hysterectomy for benign disease 12/27/2011     Priority: Medium     She had a supra-cervical hysterectomy of a bicornate uterus       Eczema 12/27/2011     Priority: Medium    Family history of colon cancer 12/29/2010     Priority: Medium     Grandparents (2) ages late 60's, and early 70's      CARDIOVASCULAR SCREENING; LDL GOAL LESS THAN 160 05/09/2010     Priority: Medium    Other acne 12/07/2007     Priority: Medium    Hemorrhoids 11/23/2004     Priority: Medium     Problem list name updated by automated process. Provider to review         Medications     Current Outpatient Medications   Medication Sig Dispense Refill    ascorbic acid (VITAMIN C) 1000 MG TABS Take 1 tablet by mouth daily.      aspirin 81 MG EC tablet Take 81 mg by mouth daily      Calcium Carb-Cholecalciferol 600-800 MG-UNIT TABS  "Dose unknown      cimetidine (TAGAMET) 400 MG tablet Take 1 tablet (400 mg) by mouth At Bedtime 90 tablet 3    desonide (DESOWEN) 0.05 % external ointment Apply thin layer to affected area BID. 30 g 1    GLUCOSAMINE CHONDROITIN OR TABS TAKE 3 TABLETS (1500MG-GLUCOSAMINE) DAILY 300 tab 3    MULTI-VITAMIN OR TABS 1 TABLET DAILY 35 0    olopatadine (PATANOL) 0.1 % ophthalmic solution Place 1 drop into both eyes 2 times daily 5 mL 3    omega-3 fatty acids 1200 MG capsule Take 1 capsule by mouth daily.      omeprazole (PRILOSEC) 40 MG DR capsule Take 1 capsule (40 mg) by mouth daily 90 capsule 3    SUMAtriptan (IMITREX) 25 MG tablet TAKE 1 TABLET BY MOUTH AS NEEDED AS DIRECTED 12 tablet 1    vitamin B complex with vitamin C (STRESS TAB) tablet Take 1 tablet by mouth daily      vitamin E 400 UNIT capsule Take 1 capsule by mouth daily.      benzoyl peroxide 5 % topical gel Apply topically daily (Patient not taking: Reported on 4/19/2024) 60 g 1    estradiol (ESTRACE) 0.5 MG tablet TAKE 1 TAB) BY MOUTH DAILY PATIENT NEEDS A FOLLOW-UP APPOINTMENT 90 tablet 3    neomycin-polymyxin-dexamethasone (MAXITROL) 3.5-37984-4.1 ophthalmic ointment Place 0.5 inches into both eyes At Bedtime (Patient not taking: Reported on 4/19/2024) 3.5 g 0    nitroGLYcerin (NITRO-BID) 2 % OINT ointment 3-4 times a day. Use with 9 parts petroleum and apply to hemmrhoids.(PLEASE MIX FOR PATIENT) (Patient not taking: Reported on 4/19/2024) 15 g 1     No current facility-administered medications for this visit.        Allergies     No Known Allergies    ROS     Pertinent positives and negatives are listed in the HPI.     Physical Exam   Vitals: /84   Ht 1.664 m (5' 5.5\")   Wt 58.4 kg (128 lb 11.2 oz)   LMP  (LMP Unknown)   BMI 21.09 kg/m      General: Comfortable, no obvious distress, normal body habitus  Breast:  symmetrical.    Psych: Alert and orientated x 3. Appropriate affect, good insight.   : Normal female external genitalia.  No " lesions.  Urethral meatus normal.  Speculum exam reveals a normal vaginal vault x2, normal cervix x2.  Longitudinal septum appreciated. No abnormal discharge.  Bimanual exam reveals no palpable masses.    Uterine corpus surgically absent          Labs/Imaging       I have personally reviewed the labs/imaging and the findings were:    Component      Latest Ref Rng 7/19/2023  7:41 AM   WBC      4.0 - 11.0 10e3/uL 4.7    RBC Count      3.80 - 5.20 10e6/uL 4.34    Hemoglobin      11.7 - 15.7 g/dL 13.1    Hematocrit      35.0 - 47.0 % 39.9    MCV      78 - 100 fL 92    MCH      26.5 - 33.0 pg 30.2    MCHC      31.5 - 36.5 g/dL 32.8    RDW      10.0 - 15.0 % 12.1    Platelet Count      150 - 450 10e3/uL 294    % Neutrophils      % 56    % Lymphocytes      % 27    % Monocytes      % 14    % Eosinophils      % 2    % Basophils      % 1    % Immature Granulocytes      % 0    Absolute Neutrophils      1.6 - 8.3 10e3/uL 2.6    Absolute Lymphocytes      0.8 - 5.3 10e3/uL 1.3    Absolute Monocytes      0.0 - 1.3 10e3/uL 0.7    Absolute Eosinophils      0.0 - 0.7 10e3/uL 0.1    Absolute Basophils      0.0 - 0.2 10e3/uL 0.0    Absolute Immature Granulocytes      <=0.4 10e3/uL 0.0    Sodium      136 - 145 mmol/L 142    Potassium      3.4 - 5.3 mmol/L 4.2    Chloride      98 - 107 mmol/L 106    Carbon Dioxide (CO2)      22 - 29 mmol/L 26    Anion Gap      7 - 15 mmol/L 10    Urea Nitrogen      8.0 - 23.0 mg/dL 13.0    Creatinine      0.51 - 0.95 mg/dL 0.76    Calcium      8.8 - 10.2 mg/dL 9.8    Glucose      70 - 99 mg/dL 87    Alkaline Phosphatase      35 - 104 U/L 66    AST      0 - 45 U/L 27    ALT      0 - 50 U/L 17    Protein Total      6.4 - 8.3 g/dL 7.2    Albumin      3.5 - 5.2 g/dL 4.6    Bilirubin Total      <=1.2 mg/dL 0.3    GFR Estimate      >60 mL/min/1.73m2 89    Cholesterol      <200 mg/dL 265 (H)    Triglycerides      <150 mg/dL 54    HDL Cholesterol      >=50 mg/dL 74    LDL Cholesterol Calculated      <=100 mg/dL  180 (H)    Non HDL Cholesterol      <130 mg/dL 191 (H)    TSH      0.30 - 4.20 uIU/mL 2.51            Smiley Cuevas MD

## 2024-05-10 DIAGNOSIS — Z78.0 MENOPAUSE: ICD-10-CM

## 2024-05-13 ENCOUNTER — MYC MEDICAL ADVICE (OUTPATIENT)
Dept: OBGYN | Facility: CLINIC | Age: 59
End: 2024-05-13
Payer: COMMERCIAL

## 2024-05-13 DIAGNOSIS — Z78.0 MENOPAUSE: ICD-10-CM

## 2024-05-14 RX ORDER — ESTRADIOL 0.5 MG/1
0.5 TABLET ORAL DAILY
Qty: 30 TABLET | OUTPATIENT
Start: 2024-05-14

## 2024-05-14 RX ORDER — ESTRADIOL 0.5 MG/1
0.5 TABLET ORAL DAILY
Qty: 90 TABLET | Refills: 3 | Status: SHIPPED | OUTPATIENT
Start: 2024-05-14

## 2024-05-14 NOTE — TELEPHONE ENCOUNTER
Estradiol 0.5mg tablets were sent to pharmacy for a year supply on 4/19/24.  Pt is stating that pharmacy does not have the rx.  Sent it in again.    Mare Mccall RN

## 2024-06-10 ENCOUNTER — PATIENT OUTREACH (OUTPATIENT)
Dept: UROLOGY | Facility: CLINIC | Age: 59
End: 2024-06-10
Payer: COMMERCIAL

## 2024-06-10 NOTE — PROGRESS NOTES
Call placed to patient. She is not established in this clinic. She is having some very intermittent spotting since last Friday. She had a hysterectomy 20 years ago. She wants to establish with Uro/Gyn. Dr Jiménez and Dr Rodriguez do not have openings until September. Advised her to call her gynecologist and have them order a UA/UC to check for infection.  She will call them and get an appointment. Advised her to call back if she wants to see either Dr Jiménez or Dr Rodriguez in September.  She voices understanding.    Thank you,  Sonia Madrid RN, BSN Urology Triage

## 2024-06-13 NOTE — PATIENT INSTRUCTIONS
If you have labs or imaging done, the results will automatically release in [employer] without an interpretation.  Your health care professional will review those results and send an interpretation with recommendations as soon as possible, but this may be 1-3 business days.    If you have any questions regarding your visit, please contact your care team.     [employer] [facility]: [phone]  [facility] CLINIC HOURS TELEPHONE NUMBER   Kinsey Pena, YONATAN Abad-CHELSEY Garvey-CHELSEY Rodriguez-Surgery Scheduler  Dariela-       Monday- [place]  8:00 am-4:00 pm    Tuesday- [place]  8:00 am-4:00 pm    Wednesday- [place] 8:00 am-4:00 pm    Thursday- [place] 8:00 am-4:00 pm    Friday- [place]  8:00 am-4:00 pm [facility]  [address] [place]  [place] [address]  PH: [phone]  Fax: [phone]    Imaging Scheduling all locations  PH: [phone]     [facility] Labor and Delivery  [address] [place] [address] Dr.  [place], [place] [address] [phone]    [facility]  [address] [place] [address]  PH: [phone]     **Surgeries** Our Surgery Schedulers will contact you to schedule. If you do not receive a call within 3 business days, please call [phone].  Urgent Care locations:  [facility]       Monday-Friday   10 am - 8 pm    Saturday and Sunday   9 am - 5 pm   [phone]   If you need a medication refill, please contact your pharmacy. Please allow 3 business days for your refill to be completed.  As always, Thank you for trusting us with your healthcare needs!

## 2024-06-14 ENCOUNTER — OFFICE VISIT (OUTPATIENT)
Dept: OBGYN | Facility: CLINIC | Age: 59
End: 2024-06-14
Payer: COMMERCIAL

## 2024-06-14 VITALS
WEIGHT: 129.8 LBS | DIASTOLIC BLOOD PRESSURE: 86 MMHG | SYSTOLIC BLOOD PRESSURE: 133 MMHG | HEART RATE: 79 BPM | BODY MASS INDEX: 21.27 KG/M2

## 2024-06-14 DIAGNOSIS — N93.9 VAGINAL SPOTTING: Primary | ICD-10-CM

## 2024-06-14 DIAGNOSIS — N89.8 VAGINAL ITCHING: ICD-10-CM

## 2024-06-14 LAB
ALBUMIN UR-MCNC: NEGATIVE MG/DL
APPEARANCE UR: CLEAR
BILIRUB UR QL STRIP: NEGATIVE
CLUE CELLS: ABNORMAL
COLOR UR AUTO: COLORLESS
GLUCOSE UR STRIP-MCNC: NEGATIVE MG/DL
HGB UR QL STRIP: ABNORMAL
KETONES UR STRIP-MCNC: NEGATIVE MG/DL
LEUKOCYTE ESTERASE UR QL STRIP: NEGATIVE
NITRATE UR QL: NEGATIVE
PH UR STRIP: 6 [PH] (ref 5–7)
RBC #/AREA URNS AUTO: NORMAL /HPF
SKIP: ABNORMAL
SP GR UR STRIP: 1 (ref 1–1.03)
TRICHOMONAS, WET PREP: ABNORMAL
UROBILINOGEN UR STRIP-MCNC: NORMAL MG/DL
WBC #/AREA URNS AUTO: NORMAL /HPF
WBC'S/HIGH POWER FIELD, WET PREP: ABNORMAL
YEAST, WET PREP: ABNORMAL

## 2024-06-14 PROCEDURE — 81001 URINALYSIS AUTO W/SCOPE: CPT

## 2024-06-14 PROCEDURE — 99213 OFFICE O/P EST LOW 20 MIN: CPT

## 2024-06-14 PROCEDURE — 87210 SMEAR WET MOUNT SALINE/INK: CPT

## 2024-06-14 PROCEDURE — 87624 HPV HI-RISK TYP POOLED RSLT: CPT

## 2024-06-14 PROCEDURE — G0145 SCR C/V CYTO,THINLAYER,RESCR: HCPCS

## 2024-06-14 RX ORDER — FLUCONAZOLE 150 MG/1
150 TABLET ORAL ONCE
Qty: 1 TABLET | Refills: 0 | Status: SHIPPED | OUTPATIENT
Start: 2024-06-14 | End: 2024-06-14

## 2024-06-14 NOTE — PROGRESS NOTES
Subjective:  Amanda is a 59 year old   is here today with the following concerns:    Vaginal spotting: patient reports she has had small amount of daily red vaginal spotting from last Friday () through Wednesday (). Her bleeding has stopped the past two days. She denies any pain, fevers, problems or symptoms of urinary or bowel issues. She has not been sexually active in many months and denies any chance of having an STI. She has had some vaginal itching since last week. She had a supra-cervical complete hysterectomy of a bicornate uterus in . Currently taking PO estradiol 0.5mg for VSM. Pap history negative.     Pap Hx  Date                Cervix A                     Cervix B  12 NIL pap, neg HR HPV // NIL pap, neg HR HPV.  2/9/15            NIL pap, neg HR HPV. //  NIL pap, neg HR HPV. Plan: cotest in 5 years  18            NIL pap, neg HR HPV //  NIL pap, neg HR HPV. Plan: pap in 3 years.  7/15/21 NIL pap, neg HR HPV //  NIL pap, neg HR HPV. Plan: Cotest in 5 years.  23 NIL pap, neg HR HPV // NIL pap, neg HR HPV. Plan: routine screening  24: Diagnostic pap pending     ROS: Pertinent ROS as above.    Medical history  OB History    Para Term  AB Living   0 0 0 0 0 0   SAB IAB Ectopic Multiple Live Births   0 0 0 0 0      Past Medical History:   Diagnosis Date    Esophageal reflux     Family history of colon cancer     Globus sensation     Multiple thyroid nodules     ,  FNAB benign    Other forms of migraine, without mention of intractable migraine without mention of status migrainosus     Unspecified hemorrhoids without mention of complication       Past Surgical History:   Procedure Laterality Date    COLONOSCOPY  2013    Procedure: COLONOSCOPY;;  Surgeon: Jim Flores MD;  Location: [facility]    COLONOSCOPY N/A 2019    Procedure: COLONOSCOPY;  Surgeon: Luisito Barnard MD;  [facility]    HC EXCISION BREAST LESION, OPEN >=1       benign    HEMORRHOID SURGERY      HYSTERECTOMY, PAP STILL INDICATED      benign/with both ovaries(cervix in)    REPAIR PTOSIS         ALL/Meds: Her medication and allergy histories were reviewed and are documented in their appropriate chart areas.    SH: Reviewed and documented in the appropriate area of the chart.  FH:  Her family history is reviewed and updated in the chart, today.  PMH: Her past medical, surgical, and obstetric histories were reviewed and updated today in the appropriate chart areas.    Objective:  PE: LMP  (LMP Unknown)   There is no height or weight on file to calculate BMI.    Pertinent Physical exam findings:    GENERAL: Active, alert, in no acute distress.  SKIN: Clear. No significant rash, abnormal pigmentation or lesions  MS: no gross musculoskeletal defects noted, no edema  PSYCH: Age-appropriate alertness and orientation    Pelvic:       - Ext: Vulva and perineum are normal without lesion, mass or discharge. Atrophic.       - Urethra: normal without discharge or scarring        - Bladder: no tenderness, no masses       - Vagina: atrophic       - Cervix: Cervix x2, pink, closed, negative CMT. Atrophic.        - Uterus: surgically absent       - Adnexa: Normal without masses or tenderness    A/P:  Amanda Sifuentes is a 59 year old  here today with the following concerns:  (N93.9) Vaginal spotting  (primary encounter diagnosis)  Comment: Counseled on various etiologies of vaginal spotting such as atrophy, infection, malignancy, urethral caruncle. High on differential is infection or atrophy. Not concerned about endometrial cancer because she is s/p supracervical hysterectomy. Informed patient of this. Discussed trial of vaginal estrogen if no other cause is found and bleeding returns.  Plan: UA Macroscopic with reflex to Microscopic and         Culture - Lab Collect, Wet preparation, Pap         Diagnostic with HPV, Pap Diagnostic with HPV,         Urine Culture Aerobic  Bacterial - lab collect,         US Pelvic Complete with Transvaginal,         fluconazole (DIFLUCAN) 150 MG tablet          (N89.8) Vaginal itching  Comment: Presumptively treat for yeast vaginitis due to presenting symptoms of vaginal itching.  Plan: fluconazole (DIFLUCAN) 150 MG tablet          She is agreeable to the plan above and has no further questions or concerns. She did not request a chaperone for the physical exam component of the visit today.     ARMEN Sosa CNP

## 2024-06-17 ENCOUNTER — ANCILLARY PROCEDURE (OUTPATIENT)
Dept: ULTRASOUND IMAGING | Facility: OTHER | Age: 59
End: 2024-06-17
Payer: COMMERCIAL

## 2024-06-17 DIAGNOSIS — N93.9 VAGINAL SPOTTING: ICD-10-CM

## 2024-06-17 LAB
HPV HR 12 DNA CVX QL NAA+PROBE: NEGATIVE
HPV HR 12 DNA CVX QL NAA+PROBE: NEGATIVE
HPV16 DNA CVX QL NAA+PROBE: NEGATIVE
HPV16 DNA CVX QL NAA+PROBE: NEGATIVE
HPV18 DNA CVX QL NAA+PROBE: NEGATIVE
HPV18 DNA CVX QL NAA+PROBE: NEGATIVE
HUMAN PAPILLOMA VIRUS FINAL DIAGNOSIS: NORMAL
HUMAN PAPILLOMA VIRUS FINAL DIAGNOSIS: NORMAL

## 2024-06-17 PROCEDURE — 76856 US EXAM PELVIC COMPLETE: CPT | Mod: TC | Performed by: RADIOLOGY

## 2024-06-17 PROCEDURE — 76830 TRANSVAGINAL US NON-OB: CPT | Mod: TC | Performed by: RADIOLOGY

## 2024-06-19 LAB
BKR LAB AP GYN ADEQUACY: NORMAL
BKR LAB AP GYN ADEQUACY: NORMAL
BKR LAB AP GYN INTERPRETATION: NORMAL
BKR LAB AP GYN INTERPRETATION: NORMAL
BKR LAB AP PREVIOUS ABNORMAL: NORMAL
BKR LAB AP PREVIOUS ABNORMAL: NORMAL
PATH REPORT.COMMENTS IMP SPEC: NORMAL
PATH REPORT.RELEVANT HX SPEC: NORMAL
PATH REPORT.RELEVANT HX SPEC: NORMAL

## 2024-06-20 ENCOUNTER — LAB (OUTPATIENT)
Dept: LAB | Facility: CLINIC | Age: 59
End: 2024-06-20
Payer: COMMERCIAL

## 2024-06-20 ENCOUNTER — MYC MEDICAL ADVICE (OUTPATIENT)
Dept: OBGYN | Facility: CLINIC | Age: 59
End: 2024-06-20

## 2024-06-20 DIAGNOSIS — N93.9 VAGINAL SPOTTING: ICD-10-CM

## 2024-06-20 PROCEDURE — 87088 URINE BACTERIA CULTURE: CPT

## 2024-06-20 PROCEDURE — 87086 URINE CULTURE/COLONY COUNT: CPT

## 2024-06-20 NOTE — TELEPHONE ENCOUNTER
I ordered the urine culture with her labs but it looks like they never ran the urine culture. She will have to return to lab to leave another urine sample at this point and we can culture that. Please let her know I am sorry about the inconvenience that this is.     Thank you,  ARMEN Sosa CNP

## 2024-06-20 NOTE — TELEPHONE ENCOUNTER
"Pt is asking to confirm that her urine test is negative.    Pt had a UA done on 6/14.  Per UA results by lab under the narrative \"urine culture not indicated\".  However, the result note by ordering provider states in her result note that it will take a couple days for the urine culture to come back.  It does not appear that a urine culture is being ran since it was not indicated.    Routing to ARMEN Burgos CNP, to clarify.    Mare Mccall RN    "

## 2024-06-21 LAB — BACTERIA UR CULT: ABNORMAL

## 2024-06-24 NOTE — TELEPHONE ENCOUNTER
"Mychart received from patient in response to providers message copy/pasted below.      Patient is asking for medication to treat bladder infection d/t symptom of \"irritation\".  Patient denies any urinary symptoms.     Mychart message sent to patient requesting clarification of symptoms.     providers message copy/pasted below:  \"...The urine culture grew a little group b strep.  This is a bacteria that is commonly found on the skin, and is often a contaminate and not a real infection.  If you do not have symptoms of a bladder infection, like burning with urination, urgency, frequency, then no treatment is needed.  We can also repeat the urine sample if you like.  It looks like this was done due to the vaginal bleeding you were having.  This is not likely the cause of the bleeding.  I agree with Kinsey's recommendation to consider vaginal estrogen.    Please let me know if you have any questions or concerns.  Thanks!  Dr. Cuevas, covering for Kinsey while she is out\".  "

## 2024-07-02 DIAGNOSIS — G43.809 OTHER MIGRAINE, NOT INTRACTABLE, WITHOUT STATUS MIGRAINOSUS: ICD-10-CM

## 2024-07-02 RX ORDER — SUMATRIPTAN 25 MG/1
TABLET, FILM COATED ORAL
Qty: 12 TABLET | Refills: 0 | Status: SHIPPED | OUTPATIENT
Start: 2024-07-02 | End: 2024-07-30

## 2024-07-05 ENCOUNTER — DOCUMENTATION ONLY (OUTPATIENT)
Dept: LAB | Facility: CLINIC | Age: 59
End: 2024-07-05
Payer: COMMERCIAL

## 2024-07-05 DIAGNOSIS — Z13.29 SCREENING FOR THYROID DISORDER: ICD-10-CM

## 2024-07-05 DIAGNOSIS — Z13.1 SCREENING FOR DIABETES MELLITUS: ICD-10-CM

## 2024-07-05 DIAGNOSIS — Z13.220 LIPID SCREENING: Primary | ICD-10-CM

## 2024-07-05 NOTE — PROGRESS NOTES
Amandamiguel angel Sifuentes has an upcoming lab appointment:    Future Appointments   Date Time Provider Department [place]   7/18/2024  7:00 AM RG LAB [facility]   7/30/2024  7:00 AM Alexus Pate MD [facility]   7/31/2024 11:45 AM [facility] [place]   7/31/2024  2:00 PM Delma Mcgrath MD [facility]       There is no order available. Please review and place either future orders or HMPO (Review of Health Maintenance Protocol Orders), as appropriate.    Health Maintenance Due   Topic    ANNUAL REVIEW OF HM ORDERS      Alissa Flynn

## 2024-07-18 ENCOUNTER — DOCUMENTATION ONLY (OUTPATIENT)
Dept: LAB | Facility: CLINIC | Age: 59
End: 2024-07-18

## 2024-07-18 ENCOUNTER — LAB (OUTPATIENT)
Dept: LAB | Facility: CLINIC | Age: 59
End: 2024-07-18
Payer: COMMERCIAL

## 2024-07-18 DIAGNOSIS — Z13.220 LIPID SCREENING: ICD-10-CM

## 2024-07-18 DIAGNOSIS — Z13.1 SCREENING FOR DIABETES MELLITUS: ICD-10-CM

## 2024-07-18 DIAGNOSIS — Z13.29 SCREENING FOR THYROID DISORDER: ICD-10-CM

## 2024-07-18 DIAGNOSIS — Z11.1 SCREENING FOR TUBERCULOSIS: Primary | ICD-10-CM

## 2024-07-18 DIAGNOSIS — Z11.1 SCREENING FOR TUBERCULOSIS: ICD-10-CM

## 2024-07-18 LAB
ALBUMIN SERPL BCG-MCNC: 4.4 G/DL (ref 3.5–5.2)
ALP SERPL-CCNC: 69 U/L (ref 40–150)
ALT SERPL W P-5'-P-CCNC: 17 U/L (ref 0–50)
ANION GAP SERPL CALCULATED.3IONS-SCNC: 8 MMOL/L (ref 7–15)
AST SERPL W P-5'-P-CCNC: 28 U/L (ref 0–45)
BILIRUB SERPL-MCNC: 0.3 MG/DL
BUN SERPL-MCNC: 12.8 MG/DL (ref 8–23)
CALCIUM SERPL-MCNC: 9.6 MG/DL (ref 8.8–10.4)
CHLORIDE SERPL-SCNC: 104 MMOL/L (ref 98–107)
CHOLEST SERPL-MCNC: 272 MG/DL
CREAT SERPL-MCNC: 0.71 MG/DL (ref 0.51–0.95)
EGFRCR SERPLBLD CKD-EPI 2021: >90 ML/MIN/1.73M2
FASTING STATUS PATIENT QL REPORTED: YES
FASTING STATUS PATIENT QL REPORTED: YES
GLUCOSE SERPL-MCNC: 100 MG/DL (ref 70–99)
HCO3 SERPL-SCNC: 27 MMOL/L (ref 22–29)
HDLC SERPL-MCNC: 84 MG/DL
LDLC SERPL CALC-MCNC: 175 MG/DL
NONHDLC SERPL-MCNC: 188 MG/DL
POTASSIUM SERPL-SCNC: 4.6 MMOL/L (ref 3.4–5.3)
PROT SERPL-MCNC: 7.4 G/DL (ref 6.4–8.3)
SODIUM SERPL-SCNC: 139 MMOL/L (ref 135–145)
TRIGL SERPL-MCNC: 65 MG/DL
TSH SERPL DL<=0.005 MIU/L-ACNC: 3.65 UIU/ML (ref 0.3–4.2)

## 2024-07-18 PROCEDURE — 80061 LIPID PANEL: CPT

## 2024-07-18 PROCEDURE — 36415 COLL VENOUS BLD VENIPUNCTURE: CPT

## 2024-07-18 PROCEDURE — 86481 TB AG RESPONSE T-CELL SUSP: CPT

## 2024-07-18 PROCEDURE — 84443 ASSAY THYROID STIM HORMONE: CPT

## 2024-07-18 PROCEDURE — 80053 COMPREHEN METABOLIC PANEL: CPT

## 2024-07-18 NOTE — PROGRESS NOTES
Called to . Patient getting labs and requesting TB gold test be drawn at same time. 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    Huddled with Lesa Sifuentes PAC and orders received. Alerted lab and patient. Lab done.    Sherry Acharya RN  [facility] - Registered Nurse  Clinic Triage Mic   July 18, 2024

## 2024-07-29 DIAGNOSIS — H10.13 ALLERGIC CONJUNCTIVITIS OF BOTH EYES: ICD-10-CM

## 2024-07-30 ENCOUNTER — OFFICE VISIT (OUTPATIENT)
Dept: FAMILY MEDICINE | Facility: CLINIC | Age: 59
End: 2024-07-30
Payer: COMMERCIAL

## 2024-07-30 VITALS
SYSTOLIC BLOOD PRESSURE: 128 MMHG | OXYGEN SATURATION: 100 % | DIASTOLIC BLOOD PRESSURE: 82 MMHG | RESPIRATION RATE: 16 BRPM | BODY MASS INDEX: 21.84 KG/M2 | HEIGHT: 65 IN | TEMPERATURE: 97.2 F | HEART RATE: 71 BPM | WEIGHT: 131.06 LBS

## 2024-07-30 DIAGNOSIS — K64.9 HEMORRHOIDS, UNSPECIFIED HEMORRHOID TYPE: ICD-10-CM

## 2024-07-30 DIAGNOSIS — L72.0 EPIDERMOID CYST OF SKIN OF BACK: ICD-10-CM

## 2024-07-30 DIAGNOSIS — K21.9 GASTROESOPHAGEAL REFLUX DISEASE WITHOUT ESOPHAGITIS: ICD-10-CM

## 2024-07-30 DIAGNOSIS — L70.0 ACNE VULGARIS: ICD-10-CM

## 2024-07-30 DIAGNOSIS — G43.809 OTHER MIGRAINE, NOT INTRACTABLE, WITHOUT STATUS MIGRAINOSUS: ICD-10-CM

## 2024-07-30 DIAGNOSIS — Z80.0 FAMILY HISTORY OF COLON CANCER: ICD-10-CM

## 2024-07-30 DIAGNOSIS — Z13.220 LIPID SCREENING: ICD-10-CM

## 2024-07-30 DIAGNOSIS — Z00.00 ROUTINE GENERAL MEDICAL EXAMINATION AT A HEALTH CARE FACILITY: Primary | ICD-10-CM

## 2024-07-30 PROCEDURE — 99214 OFFICE O/P EST MOD 30 MIN: CPT | Mod: 25 | Performed by: FAMILY MEDICINE

## 2024-07-30 PROCEDURE — 99396 PREV VISIT EST AGE 40-64: CPT | Performed by: FAMILY MEDICINE

## 2024-07-30 RX ORDER — OMEPRAZOLE 40 MG/1
40 CAPSULE, DELAYED RELEASE ORAL DAILY
Qty: 90 CAPSULE | Refills: 3 | Status: SHIPPED | OUTPATIENT
Start: 2024-07-30

## 2024-07-30 RX ORDER — CIMETIDINE 400 MG
400 TABLET ORAL AT BEDTIME
Qty: 90 TABLET | Refills: 3 | Status: SHIPPED | OUTPATIENT
Start: 2024-07-30

## 2024-07-30 RX ORDER — SUMATRIPTAN 25 MG/1
TABLET, FILM COATED ORAL
Qty: 12 TABLET | Refills: 0 | Status: SHIPPED | OUTPATIENT
Start: 2024-07-30

## 2024-07-30 RX ORDER — DESONIDE 0.5 MG/G
OINTMENT TOPICAL
Qty: 30 G | Refills: 3 | Status: SHIPPED | OUTPATIENT
Start: 2024-07-30

## 2024-07-30 RX ORDER — OLOPATADINE HYDROCHLORIDE 1 MG/ML
1 SOLUTION/ DROPS OPHTHALMIC 2 TIMES DAILY
Qty: 5 ML | Refills: 0 | Status: SHIPPED | OUTPATIENT
Start: 2024-07-30 | End: 2024-08-20

## 2024-07-30 RX ORDER — NITROGLYCERIN 20 MG/G
OINTMENT TOPICAL
Qty: 15 G | Refills: 1 | Status: SHIPPED | OUTPATIENT
Start: 2024-07-30

## 2024-07-30 SDOH — HEALTH STABILITY: PHYSICAL HEALTH: ON AVERAGE, HOW MANY MINUTES DO YOU ENGAGE IN EXERCISE AT THIS LEVEL?: 40 MIN

## 2024-07-30 SDOH — HEALTH STABILITY: PHYSICAL HEALTH: ON AVERAGE, HOW MANY DAYS PER WEEK DO YOU ENGAGE IN MODERATE TO STRENUOUS EXERCISE (LIKE A BRISK WALK)?: 3 DAYS

## 2024-07-30 ASSESSMENT — SOCIAL DETERMINANTS OF HEALTH (SDOH): HOW OFTEN DO YOU GET TOGETHER WITH FRIENDS OR RELATIVES?: PATIENT DECLINED

## 2024-07-30 ASSESSMENT — PAIN SCALES - GENERAL: PAINLEVEL: NO PAIN (0)

## 2024-07-30 NOTE — PATIENT INSTRUCTIONS
Patient Education   Preventive Care Advice   This is general advice given by our system to help you stay healthy. However, your care team may have specific advice just for you. Please talk to your care team about your preventive care needs.  Nutrition  Eat 5 or more servings of fruits and vegetables each day.  Try wheat bread, brown rice and whole grain pasta (instead of white bread, rice, and pasta).  Get enough calcium and vitamin D. Check the label on foods and aim for 100% of the RDA (recommended daily allowance).  Lifestyle  Exercise at least 150 minutes each week  (30 minutes a day, 5 days a week).  Do muscle strengthening activities 2 days a week. These help control your weight and prevent disease.  No smoking.  Wear sunscreen to prevent skin cancer.  Have a dental exam and cleaning every 6 months.  Yearly exams  See your health care team every year to talk about:  Any changes in your health.  Any medicines your care team has prescribed.  Preventive care, family planning, and ways to prevent chronic diseases.  Shots (vaccines)   HPV shots (up to age 26), if you've never had them before.  Hepatitis B shots (up to age 59), if you've never had them before.  COVID-19 shot: Get this shot when it's due.  Flu shot: Get a flu shot every year.  Tetanus shot: Get a tetanus shot every 10 years.  Pneumococcal, hepatitis A, and RSV shots: Ask your care team if you need these based on your risk.  Shingles shot (for age 50 and up)  General health tests  Diabetes screening:  Starting at age 35, Get screened for diabetes at least every 3 years.  If you are younger than age 35, ask your care team if you should be screened for diabetes.  Cholesterol test: At age 39, start having a cholesterol test every 5 years, or more often if advised.  Bone density scan (DEXA): At age 50, ask your care team if you should have this scan for osteoporosis (brittle bones).  Hepatitis C: Get tested at least once in your life.  STIs (sexually  transmitted infections)  Before age 24: Ask your care team if you should be screened for STIs.  After age 24: Get screened for STIs if you're at risk. You are at risk for STIs (including HIV) if:  You are sexually active with more than one person.  You don't use condoms every time.  You or a partner was diagnosed with a sexually transmitted infection.  If you are at risk for HIV, ask about PrEP medicine to prevent HIV.  Get tested for HIV at least once in your life, whether you are at risk for HIV or not.  Cancer screening tests  Cervical cancer screening: If you have a cervix, begin getting regular cervical cancer screening tests starting at age 21.  Breast cancer scan (mammogram): If you've ever had breasts, begin having regular mammograms starting at age 40. This is a scan to check for breast cancer.  Colon cancer screening: It is important to start screening for colon cancer at age 45.  Have a colonoscopy test every 10 years (or more often if you're at risk) Or, ask your provider about stool tests like a FIT test every year or Cologuard test every 3 years.  To learn more about your testing options, visit:   .  For help making a decision, visit:   https://bit.ly/hx53196.  Prostate cancer screening test: If you have a prostate, ask your care team if a prostate cancer screening test (PSA) at age 55 is right for you.  Lung cancer screening: If you are a current or former smoker ages 50 to 80, ask your care team if ongoing lung cancer screenings are right for you.  For informational purposes only. Not to replace the advice of your health care provider. Copyright   2023 [facility]. All rights reserved. Clinically reviewed by the [facility]. [employer] 194888 - REV 01/24.  Substance Use Disorder: Care Instructions  Overview     You can improve your life and health by stopping your use of alcohol or drugs. When you don't drink or use drugs, you may feel and sleep better. You may  get along better with your family, friends, and coworkers. There are medicines and programs that can help with substance use disorder.  How can you care for yourself at home?  Here are some ways to help you stay sober and prevent relapse.  If you have been given medicine to help keep you sober or reduce your cravings, be sure to take it exactly as prescribed.  Talk to your doctor about programs that can help you stop using drugs or drinking alcohol.  Do not keep alcohol or drugs in your home.  Plan ahead. Think about what you'll say if other people ask you to drink or use drugs. Try not to spend time with people who drink or use drugs.  Use the time and money spent on drinking or drugs to do something that's important to you.  Preventing a relapse  Have a plan to deal with relapse. Learn to recognize changes in your thinking that lead you to drink or use drugs. Get help before you start to drink or use drugs again.  Try to stay away from situations, friends, or places that may lead you to drink or use drugs.  If you feel the need to drink alcohol or use drugs again, seek help right away. Call a trusted friend or family member. Some people get support from organizations such as Narcotics Anonymous or [employer] or from treatment facilities.  If you relapse, get help as soon as you can. Some people make a plan with another person that outlines what they want that person to do for them if they relapse. The plan usually includes how to handle the relapse and who to notify in case of relapse.  Don't give up. Remember that a relapse doesn't mean that you have failed. Use the experience to learn the triggers that lead you to drink or use drugs. Then quit again. Recovery is a lifelong process. Many people have several relapses before they are able to quit for good.  Follow-up care is a key part of your treatment and safety. Be sure to make and go to all appointments, and call your doctor if you are having problems. It's  "also a good idea to know your test results and keep a list of the medicines you take.  When should you call for help?   Call 911  anytime you think you may need emergency care. For example, call if you or someone else:    Has overdosed or has withdrawal signs. Be sure to tell the emergency workers that you are or someone else is using or trying to quit using drugs. Overdose or withdrawal signs may include:  Losing consciousness.  Seizure.  Seeing or hearing things that aren't there (hallucinations).     Is thinking or talking about suicide or harming others.   Where to get help 24 hours a day, 7 days a week   If you or someone you know talks about suicide, self-harm, a mental health crisis, a substance use crisis, or any other kind of emotional distress, get help right away. You can:    Call the Suicide and Crisis Lifeline at 988.     Call 7-454-980-TALK (1-502.356.1458).     Text HOME to 539343 to access the Crisis Text Line.   Consider saving these numbers in your phone.  Go to [employer] for more information or to chat online.  Call your doctor now or seek immediate medical care if:    You are having withdrawal symptoms. These may include nausea or vomiting, sweating, shakiness, and anxiety.   Watch closely for changes in your health, and be sure to contact your doctor if:    You have a relapse.     You need more help or support to stop.   Where can you learn more?  Go to https://www.Mobo[employer].net/patiented  Enter H573 in the search box to learn more about \"Substance Use Disorder: Care Instructions.\"  Current as of: November 15, 2023               Content Version: 14.0    5212-6268 [employer].   Care instructions adapted under license by your healthcare professional. If you have questions about a medical condition or this instruction, always ask your healthcare professional. [employer] disclaims any warranty or liability for your use of this information.         "

## 2024-07-30 NOTE — PATIENT INSTRUCTIONS
Food Calcium in milligrams   Milk (skim, 2%, or whole; 8 oz [240 mL]) 300   Yogurt (6 oz [168 g]) 250   Orange juice (with calcium; 8 oz [240 mL]) 300   Tofu with calcium (0.5 cup [113 g]) 435   Cheese (1 oz [28 g]) 195 to 335 (hard cheese = higher calcium)   Cottage cheese (0.5 cup [113 g]) 130   Ice cream or frozen yogurt (0.5 cup [113 g]) 100   Non-dairy milks (soy, oat, almond; 8 oz [240 mL]) 300 to 450   Beans (0.5 cup cooked [113 g]) 60 to 80   Dark, leafy green vegetables (0.5 cup cooked [113 g]) 50 to 135   Almonds (24 whole) 70   Orange (1 medium) 60     Total daily dose of calcium from food products and/or supplements should be ~ 1000 mg.    The calcium is better absorbed if it is taken in smaller dosages, 2-3 times a day.    Total daily dose of vitamin D should be 1000 units (25 mcg).       Welcome to the [facility]     Our Endocrinology Clinics are here to provide you with a team-based, collaborative approach in the diagnosis and treatment of patients with diabetes and endocrine disorders. The team is made up of Physicians, Physician Assistants, Certified Diabetes Educators, Registered Nurses, Medical Assistants, Emergency Medical Technicians, and many others, all of whom have the unified goal of providing our patients with high quality care.     Please see below for some helpful tips to best navigate and use the [facility] Endocr[facility] clinic:     [place] Respect: At [facility], we are committed to a respectful and safe space for all patients, visitors, and staff.  We believe that mutual respect between patients and their care team is the foundation of quality care.  It is our expectation that you will be treated with respect by your care team.  In turn, we ask that all communication with the care team (written and verbal) be respectful and free from profanity, threatening, or abusive language.  Disrespectful communication undermines our  therapeutic relationship with you and may result in us being unable to continue to provide your care.    Refills: A provider must see you at least annually to prescribe and refill medications. This is to ensure your safety as well as meet insurance and compliance regulations.    Scheduling: Many of our Providers offer both in-person or video visits. Please call to schedule any needed follow ups as soon as possible because our provider schedules fill up very quickly. Our care team has the right to require an in-person visit when they believe that it is medically necessary. Please remember that for any virtual visits, you must be in the state of [place] at the time of the visit, otherwise we are unable to see you and you will need to be rescheduled.    Missed Appointments: If you need to cancel or miss your scheduled appointment, please call the clinic at [phone] to reschedule.  Please note if you repeatedly miss appointments or repeatedly miss appointments without calling to inform us ahead of time (no-show), the clinic may elect to not allow you to reschedule without speaking to a manager, may require a Partnership In Care Agreement prior to rescheduling, or could result in you no longer being able to receive care from the clinic. Providing the clinic with timely notification if you have to miss an appointment, allows us to better serve the needs of all of our patients.    Primary Care Provider: Our Endocrinologists are Specialists in their field. We expect you to have a Primary Care Provider established to handle any needs outside of your diabetes and endocrine care.  We would be happy to assist you find a Primary Care Provider, if you do not have one.    [employer]: [employer] is a wonderful resource that allows you access to your Care Team via online or the shaina. Please ask a member of the team if you would like help creating an account. Please note that it may take up to 2 business days for a response. [employer]  messages are not reviewed on weekends or after business hours.  Emergent or urgent care needs should never be communicated via [employer].  If you experience a medical emergency call 911 or go to the nearest emergency room.    Labs: It is recommended that you stay within the [facility] for labs but you are welcome to obtain ordered labs (with some exceptions) from any location of your choice as long as they are able to complete and process the needed labs. If you need us to fax orders to your preferred lab, please provide us the name and fax number of the lab you would like to go to so we can fax the orders. If your labs are drawn outside of the [facility], please have them fax the results to [phone] ([place]) or [phone] ([place]) or via [facility]. It is your responsibility to ensure that outside lab results are sent to us.    We look forward to working with you. Please do not hesitate to reach out with any questions.    Thank you,    The Endocrine Team    [facility] Address:   [place] Address:     [address] [place] [address]    Phone: [phone]  Fax: [phone]   [address] [place] [address]    Phone: [phone]  Fax: [phone]     [facility] [place] Cost Estimate Phone Number: [phone]    General Lab and Imaging Scheduling Phone Number: [phone]

## 2024-07-30 NOTE — PROGRESS NOTES
Preventive Care Visit  [facility] TIMMY Pate MD, Family Medicine  Jul 30, 2024      Assessment & Plan     Routine general medical examination at a health care facility  See counseling messages     Hemorrhoids, unspecified hemorrhoid type  Patient is doing well with occasional use of nitroglycerin ointment.   Refill give.   - nitroGLYcerin (NITRO-BID) 2 % OINT ointment; 3-4 times a day. Use with 9 parts petroleum and apply to hemmorhoids.(PLEASE MIX FOR PATIENT)    Other migraine, not intractable, without status migrainosus  Doing well. Well controlled. Tolerating medication.  No change in plan.    - SUMAtriptan (IMITREX) 25 MG tablet; TAKE 1 TABLET BY MOUTH AS NEEDED AS DIRECTED    Gastroesophageal reflux disease without esophagitis  Doing well. Well controlled. Tolerating medication.  No change in plan.    - omeprazole (PRILOSEC) 40 MG DR capsule; Take 1 capsule (40 mg) by mouth daily  - cimetidine (TAGAMET) 400 MG tablet; Take 1 tablet (400 mg) by mouth at bedtime    Epidermoid cyst of skin of back  Patient with what appears to be an epidermoid cyst.   Consider 4 mm punch - minimal incision technique for removal of the cyst.   Discussed with patient. Scheduled.     Acne vulgaris  Doing well. Well controlled. Tolerating medication.  No change in plan.    - desonide (DESOWEN) 0.05 % external ointment; Apply thin layer to affected area BID.  - metroNIDAZOLE (METROCREAM) 0.75 % external cream; Apply topically 2 times daily    Family history of colon cancer  Both grandparents in their 60s on mom's side.   Recommend continue with colonoscopy every 5 years.   Order placed.   - Colonoscopy Screening  Referral; Future    Lipid screening  Reviewed. Recommend exercise, healthy eating.   Can recheck in 6 months. Order is in.   - Lipid panel reflex to direct LDL Fasting; Future            Counseling  Appropriate preventive services were addressed with this patient via screening,  questionnaire, or discussion as appropriate for fall prevention, nutrition, physical activity, Tobacco-use cessation, weight loss and cognition.  Checklist reviewing preventive services available has been given to the patient.  Reviewed patient's diet, addressing concerns and/or questions.   She is at risk for lack of exercise and has been provided with information to increase physical activity for the benefit of her well-being.           Chely Posey is a 59 year old, presenting for the following:  Physical        7/30/2024     7:02 AM   Additional Questions   Roomed by VE         7/30/2024     7:02 AM   Patient Reported Additional Medications   Patient reports taking the following new medications Metronidozole Topical Cream 0.075%        Health Care Directive  Patient does not have a Health Care Directive or Living Will: Discussed advance care planning with patient; information given to patient to review.    HPI    Patient is new to the clinic today and is establishing care. Recently moved to the area.     Cyst on back. Wondering about having it removed.  Not wanting it done today but maybe in the fall. Can get big and has 'pressure'. Then needs to squeeze it hard to get some white discharge. Then feels better for a time.     Review labs from 7/18/24.     GERD- taking omeprazole. Reports her upper esophagus doesn't 'close well'. Uses Cimetidine prn. More in the summer based on foods she is eating.    SENSITIVE SKIN- uses desonide ointment prn. No new concerns.     Migraine   Since your last clinic visit, how have your headaches changed?  No change  How often are you getting headaches or migraines? Once a month   Are you able to do normal daily activities when you have a migraine? No  Are you taking rescue/relief medications? (Select all that apply) sumatriptan (Imitrex)  How helpful is your rescue/relief medication?  I get total relief if gets early enough  Are you taking any medications to prevent  migraines? (Select all that apply)  No  In the past 4 weeks, how often have you gone to urgent care or the emergency room because of your headaches?  0      7/30/2024   General Health   How would you rate your overall physical health? Excellent   Feel stress (tense, anxious, or unable to sleep) To some extent      (!) STRESS CONCERN      7/30/2024   Nutrition   Three or more servings of calcium each day? (!) NO   Diet: Regular (no restrictions)   How many servings of fruit and vegetables per day? (!) 2-3   How many sweetened beverages each day? (!) 2            7/30/2024   Exercise   Days per week of moderate/strenous exercise 3 days   Average minutes spent exercising at this level 40 min            7/30/2024   Social Factors   Frequency of gathering with friends or relatives Patient declined   Worry food won't last until get money to buy more No   Food not last or not have enough money for food? No   Do you have housing? (Housing is defined as stable permanent housing and does not include staying ouside in a car, in a tent, in an abandoned building, in an overnight shelter, or couch-surfing.) Yes   Are you worried about losing your housing? No   Lack of transportation? No   Unable to get utilities (heat,electricity)? No            7/30/2024   Fall Risk   Fallen 2 or more times in the past year? No   Trouble with walking or balance? No             7/30/2024   Dental   Dentist two times every year? Yes               Today's PHQ-2 Score:       7/30/2024     6:52 AM   PHQ-2 ( 1999 [employer])   Q1: Little interest or pleasure in doing things 0   Q2: Feeling down, depressed or hopeless 0   PHQ-2 Score 0   Q1: Little interest or pleasure in doing things Not at all   Q2: Feeling down, depressed or hopeless Not at all   PHQ-2 Score 0           7/30/2024   Substance Use   Alcohol more than 3/day or more than 7/wk No   Do you use any other substances recreationally? (!) CANNABIS PRODUCTS        Social History     Tobacco Use     Smoking status: Never     Passive exposure: Never    Smokeless tobacco: Never   Vaping Use    Vaping status: Never Used   Substance Use Topics    Alcohol use: Yes     Comment: 3 drinks per wek     Drug use: No           7/24/2023   LAST FHS-7 RESULTS   1st degree relative breast or ovarian cancer No   Any relative bilateral breast cancer No   Any male have breast cancer No   Any ONE woman have BOTH breast AND ovarian cancer No   Any woman with breast cancer before 50yrs No   2 or more relatives with breast AND/OR ovarian cancer No   2 or more relatives with breast AND/OR bowel cancer Yes           Mammogram Screening - Mammogram every 1-2 years updated in Health Maintenance based on mutual decision making        7/30/2024   STI Screening   New sexual partner(s) since last STI/HIV test? No        History of abnormal Pap smear: see problem list. Followed by gyn. Has 2 cervix.         Latest Ref Rng & Units 6/14/2024     8:10 AM 4/17/2023     4:42 PM 7/19/2021     9:50 AM   PAP / HPV   PAP  Negative for Intraepithelial Lesion or Malignancy (NILM)     Negative for Intraepithelial Lesion or Malignancy (NILM)  Negative for Intraepithelial Lesion or Malignancy (NILM)     Negative for Intraepithelial Lesion or Malignancy (NILM)  Negative for Intraepithelial Lesion or Malignancy (NILM)     Negative for Intraepithelial Lesion or Malignancy (NILM)    HPV 16 DNA Negative  Negative Negative     Negative  Negative     Negative  Negative     Negative    HPV 18 DNA Negative  Negative Negative     Negative  Negative     Negative  Negative     Negative    Other HR HPV Negative  Negative Negative     Negative  Negative     Negative  Negative     Negative      ASCVD Risk   The 10-year ASCVD risk score (Mack CAMPOS, et al., 2019) is: 3.5%    Values used to calculate the score:      Age: 59 years      Sex: Female      Is Non- : No      Diabetic: No      Tobacco smoker: No      Systolic Blood Pressure: 128  "mmHg      Is BP treated: No      HDL Cholesterol: 84 mg/dL      Total Cholesterol: 272 mg/dL           Reviewed and updated as needed this visit by Provider                    BP Readings from Last 3 Encounters:   07/30/24 128/82   06/14/24 133/86   04/19/24 133/84    Wt Readings from Last 3 Encounters:   07/30/24 59.4 kg (131 lb 1 oz)   06/14/24 58.9 kg (129 lb 12.8 oz)   04/19/24 58.4 kg (128 lb 11.2 oz)                         Objective    Exam  /82 (BP Location: Left arm, Patient Position: Chair, Cuff Size: Adult Regular)   Pulse 71   Temp 97.2  F (36.2  C) (Temporal)   Resp 16   Ht 1.651 m (5' 5\")   Wt 59.4 kg (131 lb 1 oz)   LMP  (LMP Unknown)   SpO2 100%   Breastfeeding No   BMI 21.81 kg/m     Estimated body mass index is 21.81 kg/m  as calculated from the following:    Height as of this encounter: 1.651 m (5' 5\").    Weight as of this encounter: 59.4 kg (131 lb 1 oz).    Physical Exam  GENERAL: alert and no distress  EYES: Eyes grossly normal to inspection, PERRL and conjunctivae and sclerae normal  HENT: ear canals and TM's normal, nose and mouth without ulcers or lesions  NECK: no adenopathy, no asymmetry, masses, or scars  RESP: lungs clear to auscultation - no rales, rhonchi or wheezes  CV: regular rate and rhythm, normal S1 S2, no S3 or S4, no murmur, click or rub, no peripheral edema  ABDOMEN: soft, nontender, no hepatosplenomegaly, no masses and bowel sounds normal  MS: no gross musculoskeletal defects noted, no edema  SKIN: cystic lesion 4x4 mm intradermal mid back. Nontender. No erythema. Tiny similar lesion to the left of this.   NEURO: Normal strength and tone, mentation intact and speech normal  PSYCH: mentation appears normal, affect normal/bright        Signed Electronically by: Alexus Pate MD    "

## 2024-07-31 ENCOUNTER — OFFICE VISIT (OUTPATIENT)
Dept: ENDOCRINOLOGY | Facility: CLINIC | Age: 59
End: 2024-07-31
Payer: COMMERCIAL

## 2024-07-31 ENCOUNTER — ANCILLARY PROCEDURE (OUTPATIENT)
Dept: MAMMOGRAPHY | Facility: CLINIC | Age: 59
End: 2024-07-31
Attending: OBSTETRICS & GYNECOLOGY
Payer: COMMERCIAL

## 2024-07-31 VITALS
OXYGEN SATURATION: 100 % | DIASTOLIC BLOOD PRESSURE: 91 MMHG | WEIGHT: 134.5 LBS | BODY MASS INDEX: 22.38 KG/M2 | HEART RATE: 74 BPM | SYSTOLIC BLOOD PRESSURE: 150 MMHG

## 2024-07-31 DIAGNOSIS — Z12.31 VISIT FOR SCREENING MAMMOGRAM: ICD-10-CM

## 2024-07-31 DIAGNOSIS — E04.2 MULTIPLE THYROID NODULES: Primary | ICD-10-CM

## 2024-07-31 PROCEDURE — 99204 OFFICE O/P NEW MOD 45 MIN: CPT | Performed by: INTERNAL MEDICINE

## 2024-07-31 PROCEDURE — 77067 SCR MAMMO BI INCL CAD: CPT | Performed by: RADIOLOGY

## 2024-07-31 PROCEDURE — 77063 BREAST TOMOSYNTHESIS BI: CPT | Performed by: RADIOLOGY

## 2024-07-31 NOTE — NURSING NOTE
Amanda Sifuentes's goals for this visit include:   Chief Complaint   Patient presents with    Consult    Thyroid Problem     Nodules     She requests these members of her care team be copied on today's visit information: No    PCP: Alexus Pate    Referring Provider:  Referred Self, MD  No address on file    BP (!) 150/91 (BP Location: Left arm, Patient Position: Sitting, Cuff Size: Adult Regular)   Pulse 74   Wt 61 kg (134 lb 8 oz)   LMP  (LMP Unknown)   SpO2 100%   BMI 22.38 kg/m      Do you need any medication refills at today's visit? No

## 2024-07-31 NOTE — PROGRESS NOTES
Endocrinology Clinic Visit 7/31/2024    NAME:  Amanda Sifuentes  PCP:  Alexus Pate  MRN:  [id number]  Reason for Consult:  Thyroid nodules  Requesting Provider:  No ref. provider found       HISTORY OF PRESENT ILLNESS  Amanda Sifuentes is a 59 year old female with past medical history of bicornate uterus status post hysterectomy with oophorectomy at age 26, heterogeneous thyroid with thyroid nodules x 15 years, hyperlipidemia, GERD.  Presents today to discuss thyroid nodules, and estradiol supplementation. Pt previously saw Dr. Tenorio.     Thyroid nodules:  Patient has a history of multiple thyroid nodules present since approximately 2009.  She did have a FNA in 2009 which was benign.  Since this time she has been receiving approximately every 5 year thyroid ultrasounds which demonstrate stability.  She does have 3 indeterminant thyroid nodules, 1 on the right and 2 on the left which have remained fairly stable.  Patient endorses some dysphagia though it is intermittent.  No chronic persistent cough.  TSH has been within normal limits over the years.  She has noticed thinning of her eyelashes though without other hair loss.  Has had some dry skin.  She has chronic constipation though this improves with water intake.     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  Patient does always wear lead with thyroid cover.    Patient has a history of a bicornuate uterus with hysterectomy and oophorectomy at age 26.  After this time she was placed on HRT.  He remains on estradiol due to significant hot flashes which she experiences without estradiol.  No significant family history of breast cancer.  Mammogram is up-to-date.  She had a DEXA scan in 2023, which was normal.  She takes calcium carbonate 600 mg once daily.  She eats  yogurt once daily as well as leafy greens most days.  She does supplement with vitamin D though cannot remember the dose.  No history of fractures.    REVIEW OF SYSTEMS  10 point negative except as mentioned in HPI    Past Medical/Surgical History:  Past Medical History:   Diagnosis Date    Esophageal reflux     Family history of colon cancer     Globus sensation     Multiple thyroid nodules     2009, 2013 FNAB benign    Other forms of migraine, without mention of intractable migraine without mention of status migrainosus     Unspecified hemorrhoids without mention of complication      Past Surgical History:   Procedure Laterality Date    COLONOSCOPY  05/20/2013    Procedure: COLONOSCOPY;;  Surgeon: Jim Flores MD;  Location: [facility]    COLONOSCOPY N/A 01/11/2019    Procedure: COLONOSCOPY;  Surgeon: Luisito Barnard MD;  Location: [facility]    HC EXCISION BREAST LESION, OPEN >=1      benign    HEMORRHOID SURGERY      HYSTERECTOMY, PAP STILL INDICATED      benign/with both ovaries(cervix in)    REPAIR PTOSIS         Medications  Current Outpatient Medications   Medication Sig Dispense Refill    ascorbic acid (VITAMIN C) 1000 MG TABS Take 1 tablet by mouth daily.      aspirin 81 MG EC tablet Take 81 mg by mouth daily      Calcium Carb-Cholecalciferol 600-800 MG-UNIT TABS Dose unknown      cimetidine (TAGAMET) 400 MG tablet Take 1 tablet (400 mg) by mouth at bedtime 90 tablet 3    desonide (DESOWEN) 0.05 % external ointment Apply thin layer to affected area BID. 30 g 3    estradiol (ESTRACE) 0.5 MG tablet Take 1 tablet (0.5 mg) by mouth daily 90 tablet 3    GLUCOSAMINE CHONDROITIN OR TABS TAKE 3 TABLETS (1500MG-GLUCOSAMINE) DAILY 300 tab 3    metroNIDAZOLE (METROCREAM) 0.75 % external cream Apply topically 2 times daily 45 g 3    MULTI-VITAMIN OR TABS 1 TABLET DAILY 35 0    neomycin-polymyxin-dexamethasone (MAXITROL) 3.5-06119-9.1 ophthalmic ointment Place 0.5 inches into both eyes At Bedtime 3.5 g 0     nitroGLYcerin (NITRO-BID) 2 % OINT ointment 3-4 times a day. Use with 9 parts petroleum and apply to hemmorhoids.(PLEASE MIX FOR PATIENT) 15 g 1    olopatadine (PATANOL) 0.1 % ophthalmic solution INSTILL 1 DROP INTO BOTH EYES TWICE A DAY 5 mL 0    omega-3 fatty acids 1200 MG capsule Take 1 capsule by mouth daily.      omeprazole (PRILOSEC) 40 MG DR capsule Take 1 capsule (40 mg) by mouth daily 90 capsule 3    SUMAtriptan (IMITREX) 25 MG tablet TAKE 1 TABLET BY MOUTH AS NEEDED AS DIRECTED 12 tablet 0    vitamin B complex with vitamin C (STRESS TAB) tablet Take 1 tablet by mouth daily      vitamin E 400 UNIT capsule Take 1 capsule by mouth daily.       No current facility-administered medications for this visit.       Allergies  No Known Allergies      Family History  family history includes Cancer in her father; Cancer - colorectal in her maternal grandfather and maternal grandmother; Cardiovascular in her mother; Cerebrovascular Disease in her maternal grandmother; Depression in her mother; Diabetes in her maternal grandfather; Hypertension in her father; Prostate Cancer in her father.    Social History  Social History     Tobacco Use    Smoking status: Never     Passive exposure: Never    Smokeless tobacco: Never   Substance Use Topics    Alcohol use: Yes     Comment: 3 drinks per wek        Physical Exam  BP (!) 150/91 (BP Location: Left arm, Patient Position: Sitting, Cuff Size: Adult Regular)   Pulse 74   Wt 61 kg (134 lb 8 oz)   LMP  (LMP Unknown)   SpO2 100%   BMI 22.38 kg/m    Body mass index is 22.38 kg/m .  GENERAL :  In no apparent distress  EYES: No scleral icterus,  No proptosis  NECK: No visible masses.  Unable to palpate thyroid.  No lymphadenopathy anteriorly or  posteriorly  RESP: CTABL. No wheezes or crackles.   NEURO: awake, alert, responds appropriately to questions.  Reflexes +2 bilaterally in U&L extremities.       DATA REVIEW  Labs/Imaging    TSH   Date Value Ref Range Status   07/18/2024  "3.65 0.30 - 4.20 uIU/mL Final   07/19/2023 2.51 0.30 - 4.20 uIU/mL Final   07/14/2022 2.88 0.40 - 4.00 mU/L Final   11/22/2016 2.64 0.40 - 4.00 mU/L Final   04/19/2016 2.19 0.40 - 4.00 mU/L Final   02/09/2015 1.86 0.40 - 4.00 mU/L Final     Comment:     Effective 7/30/2014, the reference range for this assay has changed to reflect   new instrumentation/methodology.     05/21/2013 1.34 0.4 - 5.0 mU/L Final   11/01/2010 3.14 0.4 - 5.0 mU/L Final     T4 Free   Date Value Ref Range Status   10/21/2009 1.10 0.70 - 1.85 ng/dL Final   ]  No results found for: \"A1C\"    US THYROID 7/9/2020 7:23 AM     COMPARISON: 5/25/2017     HISTORY: Thyroid cyst; Thyroid with heterogeneous echotexture  determined by ultrasound; History of irradiation, presenting hazards  to health     FINDINGS:   Thyroid parenchyma: Mildly heterogenous.  The right lobe of the thyroid measures: 1.8 x 1.7 x 5.2 cm  The left lobe of the thyroid measures: 1.2 x 1.4 x 4.4 cm   The thyroid isthmus measures: 0.3 cm     Right Lobe:  Nodule 1:  Location: Upper pole  Size: 8 x 5 x 8 mm  Composition: Solid or almost completely solid (2 points)  Echogenicity: Hyperechoic or isoechoic (1 point)  Shape: Wider than tall (0 points)  Margin: Ill-defined (0 points)  Echogenic Foci: none or large comet tail artifact (0 points)  Stability: No significant change in size  TIRADS: TR3 (3 points)      Nodule 2:  Location: Lower pole  Size: 10 x 9 x 10 mm  Composition: Cystic or almost completely cystic (0 points)  Echogenicity: Anechoic (0 points)  Shape: Wider than tall (0 points)  Margin: Smooth (0 points)  Echogenic Foci: none or large comet tail artifact (0 points)  Stability: Decreasing  TIRADS: TR1 (0 points) Benign     Nodule 3:  Location: Lower pole  Size: 9 x 6 x 10 mm  Composition: Solid or almost completely solid (2 points)  Echogenicity: Hyperechoic or isoechoic (1 point)  Shape: Wider than tall (0 points)  Margin: Ill-defined (0 points)  Echogenic Foci: none or " large comet tail artifact (0 points)  Stability: No significant change in size  TIRADS: TR3 (3 points)      Left Lobe:     Nodule 1:  Location: Upper pole  Size: 2 x 1 x 1 mm  Composition: Solid or almost completely solid (2 points)  Echogenicity: Hyperechoic or isoechoic (1 point)  Shape: Wider than tall (0 points)  Margin: Ill-defined (0 points)  Echogenic Foci: Macro-calcifications (1 point)  Stability: No significant change in size  TIRADS: TR4 (4-6 points)      Nodule 2:  Location: Mid  Size: 7 x 5 x 8 mm  Composition: Solid or almost completely solid (2 points)  Echogenicity: Hypoechoic (2 points)  Shape: Wider than tall (0 points)  Margin: Ill-defined (0 points)  Echogenic Foci: none or large comet tail artifact (0 points)  Stability: Enlarging, previously 5 x 3 x 7 mm  TIRADS: TR4 (4-6 points)      Nodule 3:  Location: Lower pole  Size: 10 x 6 x 12 mm   Composition: Solid or almost completely solid (2 points)  Echogenicity: Hypoechoic (2 points)  Shape: Wider than tall (0 points)  Margin: Smooth (0 points)  Echogenic Foci: none or large comet tail artifact (0 points)  Stability: No significant change in size  TIRADS: TR4 (4-6 points)      Nodule 4:  Location: Lower pole  Size: 5 x 5 x 8 mm   Composition: Solid or almost completely solid (2 points)  Echogenicity: Hypoechoic (2 points)  Shape: Wider than tall (0 points)  Margin: Smooth (0 points)  Echogenic Foci: none or large comet tail artifact (0 points)  Stability: No significant change in size  TIRADS: 4                                                                    Impression:  1. Relatively stable solid thyroid nodules. Slight enlargement of  solid nodule in the left lobe (#2).  2. None of the nodules meet criteria for biopsy by TIRADS and size  criteria.      ASSESSMENT/PLAN:     Heterogeneous thyroid  Multiple thyroid nodules  As she has demonstrated stability of thyroid nodules for 15 years have very low suspicion for malignancy or risk of  malignancy.  She does however continue to have radiation exposure though she does wear lead.  Previously receiving thyroid ultrasounds approximately every 5 years, given significant radiation exposure feel that this is appropriate.  Without concerning symptoms such as persistent dysphagia or cough.  TSH within normal limits and not exhibiting symptoms of overt hypothyroidism.  -Thyroid ultrasound  -Provided this is stable, recommend follow-up in 5 years for repeat ultrasound    Status post hysterectomy with oophorectomy age 26  Time was spent discussing role of estradiol.  At this point do not feel that it is improving or reducing her risk of osteoporosis.  The role for estradiol would be for management of hot flashes, which it seems she has.  She is on a relatively low-dose of estradiol and up-to-date on screening mammograms.  Stressed importance of yearly mammograms so long is on estradiol and without significant family history of breast cancer.  -Recommended vitamin D approximately 1000 IU daily  -Provided handout on calcium containing foods  -DEXA scan reassuring  -Will have OB/GYN manage estradiol for management of hot flashes    Pt staffed with Dr. Mcgrath who agrees with this plan.     Key Gillespie DO   Internal Medicine PGY3  [facility]    I, Delma Mcgrath, was present with the resident who participated in the service and in the documentation of the note.  I have verified the history and personally performed the physical exam and medical decision making.  I agree with the assessment and plan of care as documented in the note.     Addendum: We reviewed the prior ultrasounds at the time of the visit.  In particular, the patient had a larger cyst located posterior to the right thyroid lobe which has shrunk on the most recent images and two hypoechoic nodule posterior to the left thyroid lobe which have remained stable over the years.  The thyroid gland has an overall heterogeneous ultrasound  appearance, suggestive of Hashimoto thyroiditis.  Prior calcium and parathyroid hormone levels have been normal, ruling out primary hyperparathyroidism.    Delma Mcgrath MD    45 minutes spent on the date of the encounter doing chart review, history and exam, documentation and further activities as noted above.

## 2024-07-31 NOTE — LETTER
7/31/2024      Amanda Sifuentes  [address] [facility] [address]      Dear Colleague,    Thank you for referring your patient, Amanda Sifuentes, to the [facility]. Please see a copy of my visit note below.    Endocrinology Clinic Visit 7/31/2024    NAME:  Amanda Sifuentes  PCP:  Alexus Pate  MRN:  [id number]  Reason for Consult:  Thyroid nodules  Requesting Provider:  No ref. provider found       HISTORY OF PRESENT ILLNESS  Amanda Sifuentes is a 59 year old female with past medical history of bicornate uterus status post hysterectomy with oophorectomy at age 26, heterogeneous thyroid with thyroid nodules x 15 years, hyperlipidemia, GERD.  Presents today to discuss thyroid nodules, and estradiol supplementation. Pt previously saw Dr. Tenorio.     Thyroid nodules:  Patient has a history of multiple thyroid nodules present since approximately 2009.  She did have a FNA in 2009 which was benign.  Since this time she has been receiving approximately every 5 year thyroid ultrasounds which demonstrate stability.  She does have 3 indeterminant thyroid nodules, 1 on the right and 2 on the left which have remained fairly stable.  Patient endorses some dysphagia though it is intermittent.  No chronic persistent cough.  TSH has been within normal limits over the years.  She has noticed thinning of her eyelashes though without other hair loss.  Has had some dry skin.  She has chronic constipation though this improves with water intake.     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  Patient does always wear lead with thyroid cover.    Patient has a history of a bicornuate uterus with hysterectomy and oophorectomy at age 26.  After this time she was placed on HRT.  He remains on estradiol due to  significant hot flashes which she experiences without estradiol.  No significant family history of breast cancer.  Mammogram is up-to-date.  She had a DEXA scan in 2023, which was normal.  She takes calcium carbonate 600 mg once daily.  She eats yogurt once daily as well as leafy greens most days.  She does supplement with vitamin D though cannot remember the dose.  No history of fractures.    REVIEW OF SYSTEMS  10 point negative except as mentioned in HPI    Past Medical/Surgical History:  Past Medical History:   Diagnosis Date     Esophageal reflux      Family history of colon cancer      Globus sensation      Multiple thyroid nodules     2009, 2013 FNAB benign     Other forms of migraine, without mention of intractable migraine without mention of status migrainosus      Unspecified hemorrhoids without mention of complication      Past Surgical History:   Procedure Laterality Date     COLONOSCOPY  05/20/2013    Procedure: COLONOSCOPY;;  Surgeon: Jim Flores MD;  Location:  GI     COLONOSCOPY N/A 01/11/2019    Procedure: COLONOSCOPY;  Surgeon: Luisito Barnard MD;  [facility]     HC EXCISION BREAST LESION, OPEN >=1      benign     HEMORRHOID SURGERY       HYSTERECTOMY, PAP STILL INDICATED      benign/with both ovaries(cervix in)     REPAIR PTOSIS         Medications  Current Outpatient Medications   Medication Sig Dispense Refill     ascorbic acid (VITAMIN C) 1000 MG TABS Take 1 tablet by mouth daily.       aspirin 81 MG EC tablet Take 81 mg by mouth daily       Calcium Carb-Cholecalciferol 600-800 MG-UNIT TABS Dose unknown       cimetidine (TAGAMET) 400 MG tablet Take 1 tablet (400 mg) by mouth at bedtime 90 tablet 3     desonide (DESOWEN) 0.05 % external ointment Apply thin layer to affected area BID. 30 g 3     estradiol (ESTRACE) 0.5 MG tablet Take 1 tablet (0.5 mg) by mouth daily 90 tablet 3     GLUCOSAMINE CHONDROITIN OR TABS TAKE 3 TABLETS (1500MG-GLUCOSAMINE) DAILY 300 tab 3      metroNIDAZOLE (METROCREAM) 0.75 % external cream Apply topically 2 times daily 45 g 3     MULTI-VITAMIN OR TABS 1 TABLET DAILY 35 0     neomycin-polymyxin-dexamethasone (MAXITROL) 3.5-66793-1.1 ophthalmic ointment Place 0.5 inches into both eyes At Bedtime 3.5 g 0     nitroGLYcerin (NITRO-BID) 2 % OINT ointment 3-4 times a day. Use with 9 parts petroleum and apply to hemmorhoids.(PLEASE MIX FOR PATIENT) 15 g 1     olopatadine (PATANOL) 0.1 % ophthalmic solution INSTILL 1 DROP INTO BOTH EYES TWICE A DAY 5 mL 0     omega-3 fatty acids 1200 MG capsule Take 1 capsule by mouth daily.       omeprazole (PRILOSEC) 40 MG DR capsule Take 1 capsule (40 mg) by mouth daily 90 capsule 3     SUMAtriptan (IMITREX) 25 MG tablet TAKE 1 TABLET BY MOUTH AS NEEDED AS DIRECTED 12 tablet 0     vitamin B complex with vitamin C (STRESS TAB) tablet Take 1 tablet by mouth daily       vitamin E 400 UNIT capsule Take 1 capsule by mouth daily.       No current facility-administered medications for this visit.       Allergies  No Known Allergies      Family History  family history includes Cancer in her father; Cancer - colorectal in her maternal grandfather and maternal grandmother; Cardiovascular in her mother; Cerebrovascular Disease in her maternal grandmother; Depression in her mother; Diabetes in her maternal grandfather; Hypertension in her father; Prostate Cancer in her father.    Social History  Social History     Tobacco Use     Smoking status: Never     Passive exposure: Never     Smokeless tobacco: Never   Substance Use Topics     Alcohol use: Yes     Comment: 3 drinks per wek        Physical Exam  BP (!) 150/91 (BP Location: Left arm, Patient Position: Sitting, Cuff Size: Adult Regular)   Pulse 74   Wt 61 kg (134 lb 8 oz)   LMP  (LMP Unknown)   SpO2 100%   BMI 22.38 kg/m    Body mass index is 22.38 kg/m .  GENERAL :  In no apparent distress  EYES: No scleral icterus,  No proptosis  NECK: No visible masses.  Unable to palpate  "thyroid.  No lymphadenopathy anteriorly or  posteriorly  RESP: CTABL. No wheezes or crackles.   NEURO: awake, alert, responds appropriately to questions.  Reflexes +2 bilaterally in U&L extremities.       DATA REVIEW  Labs/Imaging    TSH   Date Value Ref Range Status   07/18/2024 3.65 0.30 - 4.20 uIU/mL Final   07/19/2023 2.51 0.30 - 4.20 uIU/mL Final   07/14/2022 2.88 0.40 - 4.00 mU/L Final   11/22/2016 2.64 0.40 - 4.00 mU/L Final   04/19/2016 2.19 0.40 - 4.00 mU/L Final   02/09/2015 1.86 0.40 - 4.00 mU/L Final     Comment:     Effective 7/30/2014, the reference range for this assay has changed to reflect   new instrumentation/methodology.     05/21/2013 1.34 0.4 - 5.0 mU/L Final   11/01/2010 3.14 0.4 - 5.0 mU/L Final     T4 Free   Date Value Ref Range Status   10/21/2009 1.10 0.70 - 1.85 ng/dL Final   ]  No results found for: \"A1C\"    US THYROID 7/9/2020 7:23 AM     COMPARISON: 5/25/2017     HISTORY: Thyroid cyst; Thyroid with heterogeneous echotexture  determined by ultrasound; History of irradiation, presenting hazards  to health     FINDINGS:   Thyroid parenchyma: Mildly heterogenous.  The right lobe of the thyroid measures: 1.8 x 1.7 x 5.2 cm  The left lobe of the thyroid measures: 1.2 x 1.4 x 4.4 cm   The thyroid isthmus measures: 0.3 cm     Right Lobe:  Nodule 1:  Location: Upper pole  Size: 8 x 5 x 8 mm  Composition: Solid or almost completely solid (2 points)  Echogenicity: Hyperechoic or isoechoic (1 point)  Shape: Wider than tall (0 points)  Margin: Ill-defined (0 points)  Echogenic Foci: none or large comet tail artifact (0 points)  Stability: No significant change in size  TIRADS: TR3 (3 points)      Nodule 2:  Location: Lower pole  Size: 10 x 9 x 10 mm  Composition: Cystic or almost completely cystic (0 points)  Echogenicity: Anechoic (0 points)  Shape: Wider than tall (0 points)  Margin: Smooth (0 points)  Echogenic Foci: none or large comet tail artifact (0 points)  Stability: Decreasing  TIRADS: " TR1 (0 points) Benign     Nodule 3:  Location: Lower pole  Size: 9 x 6 x 10 mm  Composition: Solid or almost completely solid (2 points)  Echogenicity: Hyperechoic or isoechoic (1 point)  Shape: Wider than tall (0 points)  Margin: Ill-defined (0 points)  Echogenic Foci: none or large comet tail artifact (0 points)  Stability: No significant change in size  TIRADS: TR3 (3 points)      Left Lobe:     Nodule 1:  Location: Upper pole  Size: 2 x 1 x 1 mm  Composition: Solid or almost completely solid (2 points)  Echogenicity: Hyperechoic or isoechoic (1 point)  Shape: Wider than tall (0 points)  Margin: Ill-defined (0 points)  Echogenic Foci: Macro-calcifications (1 point)  Stability: No significant change in size  TIRADS: TR4 (4-6 points)      Nodule 2:  Location: Mid  Size: 7 x 5 x 8 mm  Composition: Solid or almost completely solid (2 points)  Echogenicity: Hypoechoic (2 points)  Shape: Wider than tall (0 points)  Margin: Ill-defined (0 points)  Echogenic Foci: none or large comet tail artifact (0 points)  Stability: Enlarging, previously 5 x 3 x 7 mm  TIRADS: TR4 (4-6 points)      Nodule 3:  Location: Lower pole  Size: 10 x 6 x 12 mm   Composition: Solid or almost completely solid (2 points)  Echogenicity: Hypoechoic (2 points)  Shape: Wider than tall (0 points)  Margin: Smooth (0 points)  Echogenic Foci: none or large comet tail artifact (0 points)  Stability: No significant change in size  TIRADS: TR4 (4-6 points)      Nodule 4:  Location: Lower pole  Size: 5 x 5 x 8 mm   Composition: Solid or almost completely solid (2 points)  Echogenicity: Hypoechoic (2 points)  Shape: Wider than tall (0 points)  Margin: Smooth (0 points)  Echogenic Foci: none or large comet tail artifact (0 points)  Stability: No significant change in size  TIRADS: 4                                                                    Impression:  1. Relatively stable solid thyroid nodules. Slight enlargement of  solid nodule in the left lobe  (#2).  2. None of the nodules meet criteria for biopsy by TIRADS and size  criteria.      ASSESSMENT/PLAN:     Heterogeneous thyroid  Multiple thyroid nodules  As she has demonstrated stability of thyroid nodules for 15 years have very low suspicion for malignancy or risk of malignancy.  She does however continue to have radiation exposure though she does wear lead.  Previously receiving thyroid ultrasounds approximately every 5 years, given significant radiation exposure feel that this is appropriate.  Without concerning symptoms such as persistent dysphagia or cough.  TSH within normal limits and not exhibiting symptoms of overt hypothyroidism.  -Thyroid ultrasound  -Provided this is stable, recommend follow-up in 5 years for repeat ultrasound    Status post hysterectomy with oophorectomy age 26  Time was spent discussing role of estradiol.  At this point do not feel that it is improving or reducing her risk of osteoporosis.  The role for estradiol would be for management of hot flashes, which it seems she has.  She is on a relatively low-dose of estradiol and up-to-date on screening mammograms.  Stressed importance of yearly mammograms so long is on estradiol and without significant family history of breast cancer.  -Recommended vitamin D approximately 1000 IU daily  -Provided handout on calcium containing foods  -DEXA scan reassuring  -Will have OB/GYN manage estradiol for management of hot flashes    Pt staffed with Dr. Mcgrath who agrees with this plan.     Key Gillespie DO   Internal Medicine PGY3  [facility]    I, Delma Mcgrath, was present with the resident who participated in the service and in the documentation of the note.  I have verified the history and personally performed the physical exam and medical decision making.  I agree with the assessment and plan of care as documented in the note.     Addendum: We reviewed the prior ultrasounds at the time of the visit.  In particular, the  patient had a larger cyst located posterior to the right thyroid lobe which has shrunk on the most recent images and two hypoechoic nodule posterior to the left thyroid lobe which have remained stable over the years.  The thyroid gland has an overall heterogeneous ultrasound appearance, suggestive of Hashimoto thyroiditis.  Prior calcium and parathyroid hormone levels have been normal, ruling out primary hyperparathyroidism.    Delma Mcgrath MD    45 minutes spent on the date of the encounter doing chart review, history and exam, documentation and further activities as noted above.      Again, thank you for allowing me to participate in the care of your patient.        Sincerely,        Delma Mcgrath MD

## 2024-08-08 ENCOUNTER — ANCILLARY PROCEDURE (OUTPATIENT)
Dept: ULTRASOUND IMAGING | Facility: CLINIC | Age: 59
End: 2024-08-08
Attending: INTERNAL MEDICINE
Payer: COMMERCIAL

## 2024-08-08 DIAGNOSIS — E04.2 MULTIPLE THYROID NODULES: ICD-10-CM

## 2024-08-08 PROCEDURE — 76536 US EXAM OF HEAD AND NECK: CPT

## 2024-08-16 ENCOUNTER — TELEPHONE (OUTPATIENT)
Dept: GASTROENTEROLOGY | Facility: CLINIC | Age: 59
End: 2024-08-16
Payer: COMMERCIAL

## 2024-08-16 NOTE — TELEPHONE ENCOUNTER
"Endoscopy Scheduling Screen    Have you had a positive Covid test in the last 14 days?  No    What is your communication preference for Instructions and/or Bowel Prep?   MyChart    What insurance is in the chart?  Other:      Ordering/Referring Provider: Alexus Pate MD in [facility]     (If ordering provider performs procedure, schedule with ordering provider unless otherwise instructed. )    BMI: Estimated body mass index is 22.38 kg/m  as calculated from the following:    Height as of 7/30/24: 1.651 m (5' 5\").    Weight as of 7/31/24: 61 kg (134 lb 8 oz).     Sedation Ordered  moderate sedation.   If patient BMI > 50 do not schedule in [facility].    If patient BMI > 45 do not schedule at [facility].    Are you taking methadone or Suboxone?  No    Have you had difficulties, pain, or discomfort during past endoscopy procedures?  No    Are you taking any prescription medications for pain 3 or more times per week?   NO, No RN review required.    Do you have a history of malignant hyperthermia?  No    (Females) Are you currently pregnant?   No     Have you been diagnosed or told you have pulmonary hypertension?   No    Do you have an LVAD?  No    Have you been told you have moderate to severe sleep apnea?  No    Have you been told you have COPD, asthma, or any other lung disease?  No    Do you have any heart conditions?  No     Have you ever had or are you waiting for an organ transplant?  No. Continue scheduling, no site restrictions.    Have you had a stroke or transient ischemic attack (TIA aka \"mini stroke\" in the last 6 months?   No    Have you been diagnosed with or been told you have cirrhosis of the liver?   No    Are you currently on dialysis?   No    Do you need assistance transferring?   No    BMI: Estimated body mass index is 22.38 kg/m  as calculated from the following:    Height as of 7/30/24: 1.651 m (5' 5\").    Weight as of 7/31/24: 61 kg (134 lb 8 oz).     Is patients BMI > 40 and scheduling " location [facility]?  No    Do you take an injectable medication for weight loss or diabetes (excluding insulin)?  No    Do you take the medication Naltrexone?  No    Do you take blood thinners?  No       Prep   Are you currently on dialysis or do you have chronic kidney disease?  No    Do you have a diagnosis of diabetes?  No    Do you have a diagnosis of cystic fibrosis (CF)?  No    On a regular basis do you go 3 -5 days between bowel movements?  No    BMI > 40?  No    Preferred Pharmacy:      [id number] - [place], [place] - [address]  [address] [facility]  [place] [address]  Phone: [phone] Fax: [phone]      Final Scheduling Details     Procedure scheduled  Colonoscopy    Surgeon:  CLAYTON     Date of procedure:  10/11     Pre-OP / PAC:   No - Not required for this site.    Location  [facility] - [facility] - Patient preference.    Sedation   Moderate Sedation - Per order.      Patient Reminders:   You will receive a call from a Nurse to review instructions and health history.  This assessment must be completed prior to your procedure.  Failure to complete the Nurse assessment may result in the procedure being cancelled.      On the day of your procedure, please designate an adult(s) who can drive you home stay with you for the next 24 hours. The medicines used in the exam will make you sleepy. You will not be able to drive.      You cannot take public transportation, ride share services, or non-medical taxi service without a responsible caregiver.  Medical transport services are allowed with the requirement that a responsible caregiver will receive you at your destination.  We require that drivers and caregivers are confirmed prior to your procedure.

## 2024-08-20 DIAGNOSIS — H10.13 ALLERGIC CONJUNCTIVITIS OF BOTH EYES: ICD-10-CM

## 2024-08-20 RX ORDER — OLOPATADINE HYDROCHLORIDE 1 MG/ML
1 SOLUTION/ DROPS OPHTHALMIC 2 TIMES DAILY
Qty: 5 ML | Refills: 0 | Status: SHIPPED | OUTPATIENT
Start: 2024-08-20

## 2024-08-30 ENCOUNTER — MYC MEDICAL ADVICE (OUTPATIENT)
Dept: FAMILY MEDICINE | Facility: CLINIC | Age: 59
End: 2024-08-30
Payer: COMMERCIAL

## 2024-08-30 NOTE — TELEPHONE ENCOUNTER
No lab listed in chart for ABO type and screen that would tell us her blood type. I do not see an outside one listed either. Mychart sent to patient.    Izzy Santiago CMA ([facility])

## 2024-09-13 ENCOUNTER — OFFICE VISIT (OUTPATIENT)
Dept: FAMILY MEDICINE | Facility: CLINIC | Age: 59
End: 2024-09-13
Payer: COMMERCIAL

## 2024-09-13 ENCOUNTER — TELEPHONE (OUTPATIENT)
Dept: FAMILY MEDICINE | Facility: CLINIC | Age: 59
End: 2024-09-13

## 2024-09-13 VITALS
TEMPERATURE: 98.3 F | HEIGHT: 65 IN | DIASTOLIC BLOOD PRESSURE: 78 MMHG | WEIGHT: 135 LBS | RESPIRATION RATE: 16 BRPM | SYSTOLIC BLOOD PRESSURE: 130 MMHG | BODY MASS INDEX: 22.49 KG/M2 | OXYGEN SATURATION: 99 % | HEART RATE: 72 BPM

## 2024-09-13 DIAGNOSIS — L72.0 EPIDERMOID CYST OF SKIN OF BACK: Primary | ICD-10-CM

## 2024-09-13 PROCEDURE — 11400 EXC TR-EXT B9+MARG 0.5 CM<: CPT | Performed by: FAMILY MEDICINE

## 2024-09-13 NOTE — TELEPHONE ENCOUNTER
Patient had cyst removal on her back today.     She is hoping to have her suture removal next Friday at 7 or 7:30 am.  Wondering if that would work for the RN schedule at [place].

## 2024-09-13 NOTE — PROGRESS NOTES
Sebaceous Cyst Excision Procedure Note      Pre-operative Diagnosis: Epidermal cyst      Post-operative Diagnosis: same      Locations:central lower back      Indications: increased size and pressure      Anesthesia: 1% lidocaine with epi 1 mL without added sodium bicarbonate      Procedure Details   History of allergy to iodine: no      Patient informed of the risks (including bleeding and infection) and benefits of the   procedure and printed informed consent obtained.      The lesion and surrounding area was given a sterile prep using betadine and draped in the usual sterile fashion. An incision was made over the cyst, which was dissected free of the surrounding tissue and removed.  The cyst was filled with typical sebaceous material.  Did come out in 3 pieces. The wound was closed with 4-0 vicryl using simple interrupted stitches x 3. Antibiotic ointment and a sterile dressing applied.  The specimen was not sent for pathologic examination. The patient tolerated the procedure well.      EBL: minimal      Complications:  none.      Plan:  1. Instructed to keep the wound dry and covered for 24-48h and clean thereafter.  2. Warning signs of infection were reviewed.    3. Recommended that the patient use OTC acetaminophen, ice as needed for pain.   4. Return for suture removal in 7 day(s).                  Addendum: lesion size is 4 x 5 mm

## 2024-09-16 NOTE — TELEPHONE ENCOUNTER
Sent [employer] message if willing can do at 5630 9/20.    Sherry Acharya RN  [facility] - Registered Nurse  Clinic Triage [place]   September 16, 2024

## 2024-09-20 ENCOUNTER — ALLIED HEALTH/NURSE VISIT (OUTPATIENT)
Dept: FAMILY MEDICINE | Facility: CLINIC | Age: 59
End: 2024-09-20
Payer: COMMERCIAL

## 2024-09-20 DIAGNOSIS — L72.0 EPIDERMOID CYST OF SKIN OF BACK: Primary | ICD-10-CM

## 2024-09-20 PROCEDURE — 99207 PR NO BILLABLE SERVICE THIS VISIT: CPT

## 2024-09-20 NOTE — PROGRESS NOTES
Assessment & Plan  1. Epidermoid cyst of skin of back  Had epidermoid cyst removed Friday 9/13/24 by PCP with 3 interrupted sutures placed. Presenting today for suture removal. Sutures removed during encounter without issue. Incision is well approximated without erythema, induration, or discharge.   - REMOVAL OF SUTURES      Chely Posey is a 59 year old, presenting for the following health issues:  Suture removal    Suture Removal     Had 3 interrupted sutures placed by PCP last week Friday when epidermoid cyst was removed. Presented for nurse visit to have suture removed but nurse unavailable for appointment.    Denies discharge, erythema, or pain around site.       Objective    LMP  (LMP Unknown)   There is no height or weight on file to calculate BMI.  Physical Exam   GENERAL: alert and no distress  RESP: lungs clear to auscultation - no rales, rhonchi or wheezes  SKIN: no suspicious lesions or rashes. Incision on mid lower back from epidermoid cyst removal. 3 interrupted sutures present and removed during encounter. Incision is well approximated without erythema, induration, or discharge.   PSYCH: mentation appears normal, affect normal/bright          Signed Electronically by: Umu AYERS, CNP

## 2024-09-30 ENCOUNTER — TELEPHONE (OUTPATIENT)
Dept: GASTROENTEROLOGY | Facility: CLINIC | Age: 59
End: 2024-09-30
Payer: COMMERCIAL

## 2024-10-01 NOTE — TELEPHONE ENCOUNTER
Pre visit planning completed.      Procedure details:    Patient scheduled for Colonoscopy on 10/11/24.     Arrival time: 0650. Procedure time 0735    Facility location: [facility]; [address], [place] [address]. Check in location: 2nd Floor at Surgery desk.    Sedation type: Conscious sedation     Pre op exam needed? No.    Indication for procedure: Screen for colon cancer       Chart review:     Electronic implanted devices? No    Recent diagnosis of diverticulitis within the last 6 weeks? No      Medication review:    Diabetic? No    Anticoagulants? No    Weight loss medication/injectable? No GLP-1 medication per patient's medication list.  RN will verify with pre-assessment call.    Other medication HOLDING recommendations:  N/A      Prep for procedure:     Bowel prep recommendation: Standard Miralax  Due to: standard bowel prep.    Prep instructions sent via [facility]         Corinne Kliber, RN  Endoscopy Procedure Pre Assessment RN  [phone] option 2

## 2024-10-01 NOTE — TELEPHONE ENCOUNTER
Pre assessment completed for upcoming procedure.   (Please see previous telephone encounter notes for complete details)      Procedure details:    Arrival time and facility location reviewed.    Pre op exam needed? No.    Designated  policy reviewed. Instructed to have someone stay 6  hours post procedure.       Medication review:    Medications reviewed. Please see supporting documentation below. Holding recommendations discussed (if applicable).       Prep for procedure:     Procedure prep instructions reviewed.        Any additional information needed:  N/A      Patient  verbalized understanding and had no questions or concerns at this time.      Laura Briseno RN  Endoscopy Procedure Pre Assessment   [phone] option 2

## 2024-10-11 ENCOUNTER — PATIENT OUTREACH (OUTPATIENT)
Dept: GASTROENTEROLOGY | Facility: CLINIC | Age: 59
End: 2024-10-11
Payer: COMMERCIAL

## 2024-10-11 ENCOUNTER — HOSPITAL ENCOUNTER (OUTPATIENT)
Facility: AMBULATORY SURGERY CENTER | Age: 59
Discharge: HOME | End: 2024-10-11
Attending: INTERNAL MEDICINE | Admitting: INTERNAL MEDICINE
Payer: COMMERCIAL

## 2024-10-11 VITALS
TEMPERATURE: 98 F | BODY MASS INDEX: 21.13 KG/M2 | RESPIRATION RATE: 16 BRPM | HEART RATE: 70 BPM | DIASTOLIC BLOOD PRESSURE: 80 MMHG | WEIGHT: 127 LBS | SYSTOLIC BLOOD PRESSURE: 109 MMHG | OXYGEN SATURATION: 99 %

## 2024-10-11 LAB — COLONOSCOPY: NORMAL

## 2024-10-11 PROCEDURE — G8907 PT DOC NO EVENTS ON DISCHARG: HCPCS

## 2024-10-11 PROCEDURE — 45378 DIAGNOSTIC COLONOSCOPY: CPT

## 2024-10-11 PROCEDURE — G8918 PT W/O PREOP ORDER IV AB PRO: HCPCS

## 2024-10-11 RX ORDER — NALOXONE HYDROCHLORIDE 0.4 MG/ML
0.4 INJECTION, SOLUTION INTRAMUSCULAR; INTRAVENOUS; SUBCUTANEOUS
Status: DISCONTINUED | OUTPATIENT
Start: 2024-10-11 | End: 2024-10-12 | Stop reason: HOSPADM

## 2024-10-11 RX ORDER — PROCHLORPERAZINE MALEATE 10 MG
10 TABLET ORAL EVERY 6 HOURS PRN
Status: DISCONTINUED | OUTPATIENT
Start: 2024-10-11 | End: 2024-10-12 | Stop reason: HOSPADM

## 2024-10-11 RX ORDER — ONDANSETRON 2 MG/ML
4 INJECTION INTRAMUSCULAR; INTRAVENOUS EVERY 6 HOURS PRN
Status: DISCONTINUED | OUTPATIENT
Start: 2024-10-11 | End: 2024-10-12 | Stop reason: HOSPADM

## 2024-10-11 RX ORDER — FENTANYL CITRATE 50 UG/ML
INJECTION, SOLUTION INTRAMUSCULAR; INTRAVENOUS PRN
Status: DISCONTINUED | OUTPATIENT
Start: 2024-10-11 | End: 2024-10-11 | Stop reason: HOSPADM

## 2024-10-11 RX ORDER — ONDANSETRON 2 MG/ML
4 INJECTION INTRAMUSCULAR; INTRAVENOUS
Status: DISCONTINUED | OUTPATIENT
Start: 2024-10-11 | End: 2024-10-12 | Stop reason: HOSPADM

## 2024-10-11 RX ORDER — FLUMAZENIL 0.1 MG/ML
0.2 INJECTION, SOLUTION INTRAVENOUS
Status: ACTIVE | OUTPATIENT
Start: 2024-10-11 | End: 2024-10-11

## 2024-10-11 RX ORDER — NALOXONE HYDROCHLORIDE 0.4 MG/ML
0.2 INJECTION, SOLUTION INTRAMUSCULAR; INTRAVENOUS; SUBCUTANEOUS
Status: DISCONTINUED | OUTPATIENT
Start: 2024-10-11 | End: 2024-10-12 | Stop reason: HOSPADM

## 2024-10-11 RX ORDER — ONDANSETRON 4 MG/1
4 TABLET, ORALLY DISINTEGRATING ORAL EVERY 6 HOURS PRN
Status: DISCONTINUED | OUTPATIENT
Start: 2024-10-11 | End: 2024-10-12 | Stop reason: HOSPADM

## 2024-10-11 RX ORDER — LIDOCAINE 40 MG/G
CREAM TOPICAL
Status: DISCONTINUED | OUTPATIENT
Start: 2024-10-11 | End: 2024-10-12 | Stop reason: HOSPADM

## 2024-10-11 NOTE — H&P
[facility] Pre-op History and Physical    Amanda Sifuentes MRN# [id number]   Age: 59 year old YOB: 1965            Date of Exam 10/11/2024         Primary care provider: Alexus Pate         Chief Complaint and/or Reason for Procedure:     CRC screening - multiple second degree relatives with CRC         Active problem list:     Patient Active Problem List    Diagnosis Date Noted    Other type of migraine 11/23/2004     Priority: High     Diagnosis updated by automated process. Provider to review and confirm.      Esophageal reflux 11/23/2004     Priority: High    Borderline high cholesterol 07/19/2022     Priority: Medium    Thyroid nodule 08/06/2020     Priority: Medium    Cervical cancer screening      Priority: Medium     Date  Cervix A  Cervix B  12/27/12 NIL pap, neg HR HPV // NIL pap, neg HR HPV.  2/9/15  NIL pap, neg HR HPV. //  NIL pap, neg HR HPV. Plan: cotest in 5 years  4/9/18 NIL pap, neg HR HPV //  NIL pap, neg HR HPV. Plan: pap in 3 years.  7/15/21 NIL pap, neg HR HPV //  NIL pap, neg HR HPV. Plan: Cotest in 5 years.  4/21/23 NIL pap, neg HR HPV // NIL pap, neg HR HPV. Plan: routine screening  6/14/24 NIL pap, neg HPV // NIL pap, neg HPV      Thyroid with heterogeneous echotexture determined by ultrasound 11/22/2016     Priority: Medium    Thyroid cyst 11/22/2016     Priority: Medium    History of irradiation, presenting hazards to health 11/22/2016     Priority: Medium    Double cervix 10/31/2016     Priority: Medium    Porokeratosis 02/10/2015     Priority: Medium    Actinic keratosis 02/09/2015     Priority: Medium    Hormone replacement therapy (postmenopausal) 12/30/2012     Priority: Medium    Hyperlipidemia LDL goal <160 12/27/2012     Priority: Medium    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     Priority: Medium    Solar elastosis 07/07/2012     Priority: Medium    AK (actinic keratosis) 07/07/2012     Priority: Medium    History of hysterectomy for benign  disease 12/27/2011     Priority: Medium     She had a supra-cervical hysterectomy of a bicornate uterus       Eczema 12/27/2011     Priority: Medium    Family history of colon cancer 12/29/2010     Priority: Medium     Grandparents (2) ages late 60's, and early 70's      CARDIOVASCULAR SCREENING; LDL GOAL LESS THAN 160 05/09/2010     Priority: Medium    Other acne 12/07/2007     Priority: Medium    Hemorrhoids 11/23/2004     Priority: Medium     Problem list name updated by automated process. Provider to review              Medications (include herbals and vitamins):   Any Plavix use in the last 7 days? No     Current Outpatient Medications   Medication Sig Dispense Refill    cimetidine (TAGAMET) 400 MG tablet Take 1 tablet (400 mg) by mouth at bedtime 90 tablet 3    estradiol (ESTRACE) 0.5 MG tablet Take 1 tablet (0.5 mg) by mouth daily 90 tablet 3    nitroGLYcerin (NITRO-BID) 2 % OINT ointment 3-4 times a day. Use with 9 parts petroleum and apply to hemmorhoids.(PLEASE MIX FOR PATIENT) 15 g 1    ascorbic acid (VITAMIN C) 1000 MG TABS Take 1 tablet by mouth daily.      aspirin 81 MG EC tablet Take 81 mg by mouth daily      Calcium Carb-Cholecalciferol 600-800 MG-UNIT TABS Dose unknown      desonide (DESOWEN) 0.05 % external ointment Apply thin layer to affected area BID. 30 g 3    GLUCOSAMINE CHONDROITIN OR TABS TAKE 3 TABLETS (1500MG-GLUCOSAMINE) DAILY 300 tab 3    metroNIDAZOLE (METROCREAM) 0.75 % external cream Apply topically 2 times daily 45 g 3    MULTI-VITAMIN OR TABS 1 TABLET DAILY 35 0    neomycin-polymyxin-dexamethasone (MAXITROL) 3.5-38323-1.1 ophthalmic ointment Place 0.5 inches into both eyes At Bedtime 3.5 g 0    olopatadine (PATANOL) 0.1 % ophthalmic solution INSTILL 1 DROP INTO BOTH EYES TWICE A DAY 5 mL 0    omega-3 fatty acids 1200 MG capsule Take 1 capsule by mouth daily.      omeprazole (PRILOSEC) 40 MG DR capsule Take 1 capsule (40 mg) by mouth daily 90 capsule 3    SUMAtriptan (IMITREX) 25 MG  tablet TAKE 1 TABLET BY MOUTH AS NEEDED AS DIRECTED 12 tablet 0    vitamin B complex with vitamin C (STRESS TAB) tablet Take 1 tablet by mouth daily      vitamin E 400 UNIT capsule Take 1 capsule by mouth daily.       Current Facility-Administered Medications   Medication Dose Route Frequency Provider Last Rate Last Admin    lidocaine (LMX4) kit   Topical Q1H PRN Marisol Bah DO        lidocaine 1 % 0.1-1 mL  0.1-1 mL Other Q1H PRN Marisol Bah DO        ondansetron (ZOFRAN) injection 4 mg  4 mg Intravenous Once PRN Marisol Bah DO        sodium chloride (PF) 0.9% PF flush 3 mL  3 mL Intracatheter Q8H Marisol Bah DO        sodium chloride (PF) 0.9% PF flush 3 mL  3 mL Intracatheter q1 min prn Marisol Bah DO                 Allergies:    No Known Allergies  Allergy to Latex? No  Allergy to tape?   No  Intolerances:             Physical Exam:   All vitals have been reviewed  Patient Vitals for the past 8 hrs:   BP Temp Temp src Pulse Resp SpO2 Weight   10/11/24 0708 131/87 98  F (36.7  C) Temporal 72 16 99 % 57.6 kg (127 lb)     No intake/output data recorded.  Lungs:   no increased work of breathing     Cardiovascular:   RRR             Lab / Radiology Results:         Anesthetic risk and/or ASA classification:     2  Marisol Bah DO

## 2024-10-25 ENCOUNTER — IMMUNIZATION (OUTPATIENT)
Dept: FAMILY MEDICINE | Facility: CLINIC | Age: 59
End: 2024-10-25
Payer: COMMERCIAL

## 2024-10-25 VITALS — TEMPERATURE: 97.9 F

## 2024-10-25 DIAGNOSIS — Z23 NEED FOR PROPHYLACTIC VACCINATION AND INOCULATION AGAINST INFLUENZA: Primary | ICD-10-CM

## 2024-10-25 DIAGNOSIS — Z23 HIGH PRIORITY FOR 2019-NCOV VACCINE: ICD-10-CM

## 2024-10-25 PROCEDURE — 91320 SARSCV2 VAC 30MCG TRS-SUC IM: CPT

## 2024-10-25 PROCEDURE — 99207 PR NO CHARGE NURSE ONLY: CPT

## 2024-10-25 PROCEDURE — 90480 ADMN SARSCOV2 VAC 1/ONLY CMP: CPT

## 2024-10-25 PROCEDURE — 90673 RIV3 VACCINE NO PRESERV IM: CPT

## 2024-10-25 PROCEDURE — 90471 IMMUNIZATION ADMIN: CPT

## 2024-11-06 ENCOUNTER — MYC MEDICAL ADVICE (OUTPATIENT)
Dept: FAMILY MEDICINE | Facility: CLINIC | Age: 59
End: 2024-11-06
Payer: COMMERCIAL

## 2024-11-06 DIAGNOSIS — Z82.49 FAMILY HISTORY OF CORONARY ARTERY DISEASE: ICD-10-CM

## 2024-11-06 DIAGNOSIS — E78.5 HYPERLIPIDEMIA LDL GOAL <160: Primary | ICD-10-CM

## 2024-11-06 NOTE — TELEPHONE ENCOUNTER
Patient is wondering if she should complete a CT Heart Calcium Test due to her father having tripe bypass surgery and per her father's cardiologist recommendation even thought hs is not having symptoms of any kind. Please advise if wanting this to be completed or if she should do a visit of some kind first with you.    Izzy Santiago CMA ([facility])

## 2024-11-13 ENCOUNTER — TELEPHONE (OUTPATIENT)
Dept: OPHTHALMOLOGY | Facility: CLINIC | Age: 59
End: 2024-11-13
Payer: COMMERCIAL

## 2024-11-13 NOTE — TELEPHONE ENCOUNTER
[facility] Call Center    Phone Message    May a detailed message be left on voicemail: yes     Reason for Call: Other: Patient called requesting a call back. She states her eyelashes on her left eye do not grow anymore. She states it is just a section (in the middle) that does not grow. She is wondering if this is something she should see ophth for or is this more of a dermatology issue? Please call to advise.      Action Taken: Other: eye    Travel Screening: Not Applicable

## 2024-11-22 ENCOUNTER — ANCILLARY PROCEDURE (OUTPATIENT)
Dept: CT IMAGING | Facility: CLINIC | Age: 59
End: 2024-11-22
Attending: FAMILY MEDICINE
Payer: COMMERCIAL

## 2024-11-22 DIAGNOSIS — Z82.49 FAMILY HISTORY OF CORONARY ARTERY DISEASE: ICD-10-CM

## 2024-11-22 DIAGNOSIS — E78.5 HYPERLIPIDEMIA LDL GOAL <160: ICD-10-CM

## 2024-11-22 PROCEDURE — 75571 CT HRT W/O DYE W/CA TEST: CPT | Mod: GA | Performed by: INTERNAL MEDICINE

## 2024-12-01 DIAGNOSIS — G43.809 OTHER MIGRAINE, NOT INTRACTABLE, WITHOUT STATUS MIGRAINOSUS: ICD-10-CM

## 2024-12-01 DIAGNOSIS — H10.13 ALLERGIC CONJUNCTIVITIS OF BOTH EYES: ICD-10-CM

## 2024-12-02 RX ORDER — OLOPATADINE HYDROCHLORIDE 1 MG/ML
1 SOLUTION/ DROPS OPHTHALMIC 2 TIMES DAILY
Qty: 5 ML | Refills: 2 | Status: SHIPPED | OUTPATIENT
Start: 2024-12-02

## 2024-12-02 RX ORDER — SUMATRIPTAN SUCCINATE 25 MG/1
TABLET ORAL
Qty: 12 TABLET | Refills: 0 | Status: SHIPPED | OUTPATIENT
Start: 2024-12-02

## 2024-12-09 ENCOUNTER — OFFICE VISIT (OUTPATIENT)
Dept: CARDIOLOGY | Facility: CLINIC | Age: 59
End: 2024-12-09
Payer: COMMERCIAL

## 2024-12-09 VITALS
SYSTOLIC BLOOD PRESSURE: 134 MMHG | HEART RATE: 75 BPM | OXYGEN SATURATION: 100 % | DIASTOLIC BLOOD PRESSURE: 87 MMHG | BODY MASS INDEX: 22.42 KG/M2 | WEIGHT: 134.7 LBS

## 2024-12-09 DIAGNOSIS — E78.5 HYPERLIPIDEMIA LDL GOAL <160: Primary | ICD-10-CM

## 2024-12-09 ASSESSMENT — PAIN SCALES - GENERAL: PAINLEVEL_OUTOF10: NO PAIN (0)

## 2024-12-09 NOTE — NURSING NOTE
"Chief Complaint   Patient presents with    New Patient     New General Cardiology for Hyperlipidemia LDL goal <160, Family history of coronary artery disease       Initial BP (!) 154/99 (BP Location: Right arm, Patient Position: Sitting, Cuff Size: Adult Regular)   Pulse 70   Wt 61.1 kg (134 lb 11.2 oz)   LMP  (LMP Unknown)   SpO2 100%   BMI 22.42 kg/m   Estimated body mass index is 22.42 kg/m  as calculated from the following:    Height as of 9/13/24: 1.651 m (5' 5\").    Weight as of this encounter: 61.1 kg (134 lb 11.2 oz).  BP completed using cuff size: regular    Medication refills needed?: n/a      Annalise Garner, EMT  "

## 2024-12-09 NOTE — PATIENT INSTRUCTIONS
Take your medicines every day, as directed     Changes made today:  Start taking Atorvastatin 20 mg daily. Then increase to 40 mg daily in 2 weeks.   Recheck fasting labs         Cardiology Care Coordinators:      Jaclyn POTTS RN     Cardiology Rooming staff:  Annalise PARTIDA    Phone  [phone]      Fax [phone]    To Contact us     During Business Hours:  [phone]     If you are needing refills please contact your pharmacy.     For urgent after hour care please call the [facility] at [phone] or the [facility] at [phone] and ask to speak to the cardiologist on call.            HOW TO CHECK YOUR BLOOD PRESSURE AT HOME:     Avoid eating, smoking, and exercising for at least 30 minutes before taking a reading.     Be sure you have taken your BP medication at least 2-3 hours before you check it.      Sit quietly for 10 minutes before a reading.      Sit in a chair with your feet flat on the floor. Rest your  arm on a table so that the arm cuff is at the same level as your heart.     Remain still during the reading.  Record your blood pressure and pulse in a log and bring to your next appointment.       Use [employer] allows you to communicate directly with your heart team through secure messaging.  [email or web address] can be accessed any time on your phone, computer, or tablet.  If you need assistance, we'd be happy to help!             Keep your Heart Appointments:     Follow-up in 2 months

## 2024-12-09 NOTE — NURSING NOTE
Cardiac testing:   -Call to schedule CT calcium scan     Med Reconcile: Reviewed and verified all current medications with the patient. The updated medication list was printed and given to the patient./ Start taking Atorvastatin 20 mg daily as previously prescribed, then increase to 40 mg daily in 2 weeks.         Return Appointment:   -Follow-up in 2 months with repeat lipid and liver enzymes prior.     Patient stated she understood all health information given and agreed to call with further questions or concerns.

## 2024-12-09 NOTE — PROGRESS NOTES
[facility]      REFERRING PROVIDER:  Alexus Pate MD    DATE OF VISIT:  December 9, 2024    REASON FOR VISIT:  Evaluation regarding moderately high calcium score on coronary CT calcium scoring study on 11/22/2024    HISTORY OF PRESENT ILLNESS:   Ms. Amanda Sifuentes is a 59 year old  female with past medical history significant for dyslipoproteinemia, migrainous headache, gastroesophageal reflux disease, postmenopausal syndrome on hormone replacement therapy.    She apparently has a longstanding history of moderate hypercholesterolemia which was managed with lifestyle modification.  Because of concern about significant coronary artery disease following a surgical coronary artery bypass grafting surgery in her 85-year-old father, the patient elected to pursue coronary CT calcium scoring study which was performed on 11/22/2024.  This revealed a total calcium score of 142 or 91st percentile for age and sex.  On this basis, she was started on atorvastatin at 20 mg daily.  She was then referred to the cardiology clinic for further evaluation and management recommendations.    She works out on average about 3 days a week without difficulties.  She denies limitation in terms of physical stamina and activity endurance.  She also denies other recent significant symptoms referable to the cardiovascular system.  In particular, no recent history of chest pain, shortness of breath, or dyspnea on exertion.  No recent history of significant ankle edema, orthopnea, or paroxysmal nocturnal dyspnea.  No prior subjective awareness of irregular heart rhythm or rapid heart pulsation.  No prior history of severe lightheadedness, syncope or near syncope.  All in all, she would consider herself physically active and  healthy with no physical limitation.  She has no particular complaint.    Medications, personal, family, and social history reviewed.    PAST MEDICAL HISTORY:  Past Medical History:   Diagnosis Date    Esophageal reflux     Family history of colon cancer     Globus sensation     Multiple thyroid nodules     2009, 2013 FNAB benign    Other forms of migraine, without mention of intractable migraine without mention of status migrainosus     Unspecified hemorrhoids without mention of complication      PAST SURGICAL HISTORY:  Past Surgical History:   Procedure Laterality Date    COLONOSCOPY  05/20/2013    Procedure: COLONOSCOPY;;  Surgeon: Jim Flores MD;  Location: [facility]    COLONOSCOPY N/A 01/11/2019    Procedure: COLONOSCOPY;  Surgeon: Luisito Barnard MD;  Location: [facility]    COLONOSCOPY WITH CO2 INSUFFLATION N/A 10/11/2024    Procedure: Colonoscopy with CO2 insufflation;  Surgeon: Marisol Bah DO;  Location: [facility] OR    HC EXCISION BREAST LESION, OPEN >=1      benign    HEMORRHOID SURGERY      HYSTERECTOMY, PAP STILL INDICATED      benign/with both ovaries(cervix in)    REPAIR PTOSIS       CURRENT MEDICATIONS:  Current Outpatient Medications   Medication Sig Dispense Refill    ascorbic acid (VITAMIN C) 1000 MG TABS Take 1 tablet by mouth daily.      aspirin 81 MG EC tablet Take 81 mg by mouth daily      Calcium Carb-Cholecalciferol 600-800 MG-UNIT TABS Dose unknown      cimetidine (TAGAMET) 400 MG tablet Take 1 tablet (400 mg) by mouth at bedtime 90 tablet 3    desonide (DESOWEN) 0.05 % external ointment Apply thin layer to affected area BID. 30 g 3    estradiol (ESTRACE) 0.1 MG/GM vaginal cream Place 2 g vaginally twice a week. 42.5 g 4    estradiol (ESTRACE) 0.5 MG tablet Take 1 tablet (0.5 mg) by mouth daily 90 tablet 3    GLUCOSAMINE CHONDROITIN OR TABS TAKE 3 TABLETS (1500MG-GLUCOSAMINE) DAILY 300 tab 3    metroNIDAZOLE (METROCREAM) 0.75 % external cream Apply topically 2 times daily 45 g 3     MULTI-VITAMIN OR TABS 1 TABLET DAILY 35 0    neomycin-polymyxin-dexamethasone (MAXITROL) 3.5-87979-2.1 ophthalmic ointment Place 0.5 inches into both eyes At Bedtime 3.5 g 0    nitroGLYcerin (NITRO-BID) 2 % OINT ointment 3-4 times a day. Use with 9 parts petroleum and apply to hemmorhoids.(PLEASE MIX FOR PATIENT) 15 g 1    olopatadine (PATANOL) 0.1 % ophthalmic solution INSTILL 1 DROP INTO BOTH EYES TWICE A DAY 5 mL 2    omega-3 fatty acids 1200 MG capsule Take 1 capsule by mouth daily.      omeprazole (PRILOSEC) 40 MG DR capsule Take 1 capsule (40 mg) by mouth daily 90 capsule 3    SUMAtriptan (IMITREX) 25 MG tablet TAKE 1 TABLET BY MOUTH AS NEEDED AS DIRECTED 12 tablet 0    vitamin B complex with vitamin C (STRESS TAB) tablet Take 1 tablet by mouth daily      vitamin E 400 UNIT capsule Take 1 capsule by mouth daily.      atorvastatin (LIPITOR) 20 MG tablet Take 1 tablet (20 mg) by mouth daily. (Patient not taking: Reported on 12/9/2024) 90 tablet 1     ALLERGIES:  No Known Allergies    FAMILY HISTORY:  Family History   Problem Relation Age of Onset    Diabetes Maternal Grandfather     Cancer - colorectal Maternal Grandfather         70s    Hypertension Father     Prostate Cancer Father     Cancer Father         Basal Cell Carcinoma    Cerebrovascular Disease Maternal Grandmother     Cancer - colorectal Maternal Grandmother         70s    Cardiovascular Mother         MI in her 70's(4 stents)    Depression Mother      Her 85-year-old father recently underwent surgical coronary artery bypass grafting surgery.  Otherwise no family history of premature coronary artery disease, arrhythmia, unexplained syncope, or sudden unexpected death.    SOCIAL HISTORY:  Tobacco Use    Smoking status: Never     Passive exposure: Never    Smokeless tobacco: Never   Vaping Use    Vaping status: Never Used   Substance Use Topics    Alcohol use: Yes     Comment: 3 drinks per wek     Drug use: No     REVIEW OF SYSTEMS:  Other than as  stated under history of present illness and or past medical history, comprehensive review of other systems was performed and was unremarkable.    PHYSICAL EXAMINATION:  /87 (BP Location: Right arm, Patient Position: Sitting, Cuff Size: Adult Regular)   Pulse 75   Wt 61.1 kg (134 lb 11.2 oz)   LMP  (LMP Unknown)   SpO2 100%   BMI 22.42 kg/m    GENERAL APPEARANCE: alert and in no acute distress  HEENT: no icterus, no central cyanosis  LYMPH/NECK: no adenopathy, no asymmetry, no appreciable neck mass.  RESPIRATORY: lungs clear to auscultation - no rales, rhonchi or wheezes, no use of accessory muscles, no retractions, respirations are unlabored, normal respiratory rate  CARDIOVASCULAR: Auscultation reveals normal heart sounds.  There is a grade 1/6 systolic murmur at the apex.  There is no diastolic murmur.  GI: soft, non tender.  No hepatosplenomegaly.  EXTREMITIES: No significant ankle edema.  NEURO: alert, normal speech,and affect  SKIN: no ecchymoses, no rashes    I have reviewed the labs and imaging results below and made my comment in the assessment and plan.    DIAGNOSTIC DATA:  CBC RESULTS:   Lab Results   Component Value Date    WBC 4.7 07/19/2023    WBC 3.9 (L) 04/19/2021    RBC 4.34 07/19/2023    RBC 4.56 04/19/2021    HGB 13.1 07/19/2023    HGB 13.8 04/19/2021    HCT 39.9 07/19/2023    HCT 41.4 04/19/2021    MCV 92 07/19/2023    MCV 91 04/19/2021    MCH 30.2 07/19/2023    MCH 30.3 04/19/2021    MCHC 32.8 07/19/2023    MCHC 33.3 04/19/2021    RDW 12.1 07/19/2023    RDW 12.4 04/19/2021     07/19/2023     04/19/2021     BMP RESULTS:  Lab Results   Component Value Date     07/18/2024     04/19/2019    POTASSIUM 4.6 07/18/2024    POTASSIUM 5.2 07/14/2022    POTASSIUM 5.1 04/19/2019    CHLORIDE 104 07/18/2024    CHLORIDE 109 07/14/2022    CHLORIDE 108 04/19/2019    CO2 27 07/18/2024    CO2 26 07/14/2022    CO2 30 04/19/2019    ANIONGAP 8 07/18/2024    ANIONGAP 6 07/14/2022     ANIONGAP 4 04/19/2019     (H) 07/18/2024    GLC 92 07/14/2022    GLC 90 07/15/2020    BUN 12.8 07/18/2024    BUN 15 07/14/2022    BUN 11 04/19/2019    CR 0.71 07/18/2024    CR 0.76 04/19/2019    GFRESTIMATED >90 07/18/2024    GFRESTIMATED 87 04/19/2019    GFRESTBLACK >90 04/19/2019    LOUISA 9.6 07/18/2024    LOUISA 9.5 04/19/2019      Lipid profile obtained on 7/18/2024 documented total cholesterol 272, HDL 84, , triglycerides 65.  This was a slight increase from a previous profile obtained on 7/19/2023 with a total cholesterol 265, HDL 74, , triglyceride 54.    Electrocardiogram  Twelve-lead electrocardiogram obtained on 4/19/2021 was reportedly normal.    Coronary CT calcium scoring study  Coronary CT calcium scoring study obtained on 11/22/2024 was reported to show moderate coronary calcification with a total calcium score of 142 or the 91st percentile for age and sex including a score of 1 for left main, 31 for LAD, 110 for RCA and 0 for left circumflex.    ASSESSMENT AND PLAN:   She apparently had a low 10-year ASCVD risk of 3.3% prior to coronary calcium scoring.  With incorporation of calcium score, her 10-year ASCVD risk was calculated at 6.5%.  This would put her into the moderate risk category.  On this basis, I would concur with the recent initiation of moderate intensity statin therapy at 20 mg daily.  Judging from historical LDL level consistently around 180, it would appear likely that she probably would require a slightly higher dose to achieve a goal LDL of less than 100 mg percent-  A. I reviewed with her possible side effects of statin therapy and the pros and cons of initiation of lipid-lowering medication.  I also pointed out to her the importance of continuing with lifestyle modification.  Although she denies a history of hypertension, she did have occasional documentation of elevated blood pressure during clinic encounters such as a reading of 154/59 mmHg in the clinic today.   "This is likely a manifestation of \"whitecoat hypertension\".-  A. I pointed out to her that there is a correlation between \"whitecoat hypertension\" and true hypertension.  Therefore, it would be prudent to monitor her blood pressure at home and to inform her healthcare providers if there would be more than the occasional blood pressure elevation.  She otherwise appears to be physically active and healthy with no significant symptoms referable to the cardiovascular system.  Since the patient apparently has not yet started the atorvastatin which was recently prescribed, I suggested to her to start taking the 20 mg daily as prescribed.  This dosage can be increased to 40 mg daily if tolerated in about 2 weeks.  A repeat lipid profile with liver enzymes will be repeated in 6 to 8 weeks on the higher dose of atorvastatin.  She will return to the cardiology clinic for follow-up in about 2 months to review progress or lack thereof with initiation of moderate intensity statin therapy.  At the conclusion of the clinic encounter, the patient appeared to have a reasonable understanding of our discussion and stated no question.  She would like to proceed as recommended.    Total time spent today for this visit is 35 minutes including precharting, face-to-face clinic visit, review of labs/imaging and medical documentation.    Douglas Holm MD, FACC, FAHA, FHRS, CCDS  Cardiologist        "

## 2025-01-15 ENCOUNTER — OFFICE VISIT (OUTPATIENT)
Dept: OPHTHALMOLOGY | Facility: CLINIC | Age: 60
End: 2025-01-15
Attending: OPHTHALMOLOGY
Payer: COMMERCIAL

## 2025-01-15 DIAGNOSIS — H02.721: Primary | ICD-10-CM

## 2025-01-15 DIAGNOSIS — H02.724: ICD-10-CM

## 2025-01-15 PROCEDURE — 99212 OFFICE O/P EST SF 10 MIN: CPT | Performed by: OPHTHALMOLOGY

## 2025-01-15 PROCEDURE — 92285 EXTERNAL OCULAR PHOTOGRAPHY: CPT | Performed by: OPHTHALMOLOGY

## 2025-01-15 RX ORDER — NEOMYCIN SULFATE, POLYMYXIN B SULFATE, AND DEXAMETHASONE 3.5; 10000; 1 MG/G; [USP'U]/G; MG/G
0.5 OINTMENT OPHTHALMIC AT BEDTIME
Qty: 7 G | Refills: 1 | Status: SHIPPED | OUTPATIENT
Start: 2025-01-15

## 2025-01-15 ASSESSMENT — CONF VISUAL FIELD
OD_INFERIOR_NASAL_RESTRICTION: 0
OS_SUPERIOR_TEMPORAL_RESTRICTION: 0
METHOD: COUNTING FINGERS
OD_INFERIOR_TEMPORAL_RESTRICTION: 0
OD_SUPERIOR_NASAL_RESTRICTION: 0
OS_INFERIOR_TEMPORAL_RESTRICTION: 0
OS_SUPERIOR_NASAL_RESTRICTION: 0
OD_NORMAL: 1
OD_SUPERIOR_TEMPORAL_RESTRICTION: 0
OS_INFERIOR_NASAL_RESTRICTION: 0
OS_NORMAL: 1

## 2025-01-15 ASSESSMENT — TONOMETRY
IOP_METHOD: ICARE
OD_IOP_MMHG: 12
OS_IOP_MMHG: 13

## 2025-01-15 ASSESSMENT — VISUAL ACUITY
OS_SC: 20/20
METHOD: SNELLEN - LINEAR
OD_SC+: -3
OD_SC: 20/25

## 2025-01-15 ASSESSMENT — EXTERNAL EXAM - LEFT EYE: OS_EXAM: ROSACEA

## 2025-01-15 ASSESSMENT — EXTERNAL EXAM - RIGHT EYE: OD_EXAM: ROSACEA

## 2025-01-15 NOTE — PATIENT INSTRUCTIONS
Start warm compresses to your eyelids for 5 minutes twice a day    Start maxitrol ointment to the upper eyelids at bedtime

## 2025-01-15 NOTE — PROGRESS NOTES
HPI    Amanda Sifuentes is a(n) 59 year old female who presents for eyelid evaluation. Last eye exam was over 2 year(s) ago. Since exam, vision is about the same. Notices losing lashes in the right and left upper eyelids x1 year. Uses lubricating drops. No flashes with occasional floaters. No eye pain.     Deng Mays COT 7:36 AM January 15, 2025       Last edited by Deng Mays on 1/15/2025  7:42 AM.          Review of systems for the eyes was negative other than the pertinent positives/negatives listed in the HPI.      Assessment & Plan      Amanda Sifuentes is a 59 year old female with the following diagnoses:   1. Madarosis of right upper eyelid    2. Madarosis of left upper eyelid         Here for madarosis concerns on her right and left upper lids.  Started within the past year and was attributed to added live stress when moving homes.  Denies eye irritation, bump, or associated redness.  Has tried eyelid massage, vitamin E, and a holistic cream without improvement.  Waxes and wanes.  The left upper lid seems to have improved.  Most noting central right upper lid lash loss currently.    Uses hypoallergenic mascara.  No other alopecia noted.  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  Much less likely sebaceous cell CA given waxing and waning course and bilaterality, though I would recommend second opinion with oculoplastics in 1 month    Start maxitrol ointment to bilateral upper lids for 1 month  Warm compresses twice a day   Lid hygiene   SL photos today        Patient disposition:   Return in about 4 weeks (around 2/12/2025) for Oculoplastics.           Attending Physician Attestation:  Complete documentation of historical and exam elements from today's encounter can be found in the full encounter summary report (not reduplicated in this progress note).  I personally obtained the chief complaint(s) and history of present illness.  I confirmed and edited as necessary  the review of systems, past medical/surgical history, family history, social history, and examination findings as documented by others; and I examined the patient myself.  I personally reviewed the relevant tests, images, and reports as documented above.  I formulated and edited as necessary the assessment and plan and discussed the findings and management plan with the patient and family. . - Wilman Marlow MD

## 2025-01-16 NOTE — TELEPHONE ENCOUNTER
FUTURE VISIT INFORMATION      FUTURE VISIT INFORMATION:  Date: 3/31/25  Time: 8:00am  Location: [facility] [place]  REFERRAL INFORMATION:  Referring provider:  Wilman Marlow MD   Referring providers clinic:  [facility]  Reason for visit/diagnosis  Bilateral upper lids to demonstrate madarosis     RECORDS REQUESTED FROM:       Clinic name Comments Records Status Imaging Status   [facility] Ov/referral 1/15/25  Ov 9/29/22 epic

## 2025-01-23 DIAGNOSIS — G43.809 OTHER MIGRAINE, NOT INTRACTABLE, WITHOUT STATUS MIGRAINOSUS: ICD-10-CM

## 2025-01-23 RX ORDER — SUMATRIPTAN SUCCINATE 25 MG/1
TABLET ORAL
Qty: 12 TABLET | Refills: 2 | Status: SHIPPED | OUTPATIENT
Start: 2025-01-23

## 2025-01-30 ENCOUNTER — TRANSFERRED RECORDS (OUTPATIENT)
Dept: HEALTH INFORMATION MANAGEMENT | Facility: CLINIC | Age: 60
End: 2025-01-30
Payer: COMMERCIAL

## 2025-02-19 DIAGNOSIS — Z78.0 MENOPAUSE: ICD-10-CM

## 2025-02-19 NOTE — TELEPHONE ENCOUNTER
Requested Prescriptions   Pending Prescriptions Disp Refills    estradiol (ESTRACE) 0.5 MG tablet [Pharmacy Med Name: ESTRADIOL 0.5 MG TABLET] 90 tablet 3     Sig: TAKE 1 TABLET BY MOUTH EVERY DAY       Hormone Replacement Therapy Failed - 2/19/2025  2:50 PM        Failed - Medication is active on med list and the sig matches. RN to manually verify dose and sig if red X/fail.     If the protocol passes (green check), you do not need to verify med dose and sig.    A prescription matches if they are the same clinical intention.    For Example: once daily and every morning are the same.    For all fails (red x), verify dose and sig.    If the refill does match what is on file, the RN can still proceed to approve the refill request.     If they do not match, route to the appropriate provider.             Passed - Most recent blood pressure under 140/90 in past 12 months     BP Readings from Last 3 Encounters:   12/09/24 134/87   10/11/24 109/80   09/13/24 130/78       No data recorded            Passed - Recent (12 mo) or future (90 days) visit within the authorizing provider's specialty     The patient must have completed an in-person or virtual visit within the past 12 months or has a future visit scheduled within the next 90 days with the authorizing provider s specialty.  Urgent care and e-visits do not qualify as an office visit for this protocol.          Passed - Medication indicated for associated diagnosis     The medication is prescribed for one or more of the following conditions:    Menopause   Vulva/Vaginal atrophy   Low Estrogen   Gender Dysphoria   Male to Female transgender          Passed - Patient is 18 years of age or older        Passed - No active pregnancy on record        Passed - No positive pregnancy test on record in past 12 months           Last Written Prescription Date:  5/14/2024   Last Fill Quantity: 90,  # refills: 3   Last office visit: 6/14/2024 with Kinsey R, 4/19 with [email or web address]  Yuan  Office Visit:      Pt just refilled her last 90 day supply per pharmacy. No refill needed at this time. Routing to provider to close out request.    Lesa Desouza RN on 2/19/2025 at 3:00 PM

## 2025-02-20 RX ORDER — ESTRADIOL 0.5 MG/1
0.5 TABLET ORAL DAILY
Qty: 90 TABLET | Refills: 3 | Status: SHIPPED | OUTPATIENT
Start: 2025-02-20

## 2025-03-24 DIAGNOSIS — K64.9 HEMORRHOIDS, UNSPECIFIED HEMORRHOID TYPE: ICD-10-CM

## 2025-03-25 RX ORDER — NITROGLYCERIN 20 MG/G
OINTMENT TOPICAL
Qty: 15 G | Refills: 1 | Status: SHIPPED | OUTPATIENT
Start: 2025-03-25

## 2025-03-31 ENCOUNTER — PRE VISIT (OUTPATIENT)
Dept: OPHTHALMOLOGY | Facility: CLINIC | Age: 60
End: 2025-03-31

## 2025-03-31 ENCOUNTER — OFFICE VISIT (OUTPATIENT)
Dept: OPHTHALMOLOGY | Facility: CLINIC | Age: 60
End: 2025-03-31
Payer: COMMERCIAL

## 2025-03-31 ENCOUNTER — TELEPHONE (OUTPATIENT)
Dept: FAMILY MEDICINE | Facility: CLINIC | Age: 60
End: 2025-03-31

## 2025-03-31 DIAGNOSIS — H02.724: ICD-10-CM

## 2025-03-31 DIAGNOSIS — H02.721: Primary | ICD-10-CM

## 2025-03-31 PROCEDURE — 99213 OFFICE O/P EST LOW 20 MIN: CPT | Mod: GC | Performed by: OPHTHALMOLOGY

## 2025-03-31 PROCEDURE — 92285 EXTERNAL OCULAR PHOTOGRAPHY: CPT | Mod: GC | Performed by: OPHTHALMOLOGY

## 2025-03-31 ASSESSMENT — VISUAL ACUITY
METHOD: SNELLEN - LINEAR
OD_SC: 20/20
OS_SC: 20/20
OD_SC+: -1
OS_SC+: -1

## 2025-03-31 ASSESSMENT — TONOMETRY
IOP_METHOD: ICARE
OD_IOP_MMHG: 14
OS_IOP_MMHG: 13

## 2025-03-31 ASSESSMENT — CONF VISUAL FIELD
OS_INFERIOR_TEMPORAL_RESTRICTION: 0
OD_INFERIOR_NASAL_RESTRICTION: 0
OD_INFERIOR_TEMPORAL_RESTRICTION: 0
OS_INFERIOR_NASAL_RESTRICTION: 0
OD_SUPERIOR_TEMPORAL_RESTRICTION: 0
OS_NORMAL: 1
OS_SUPERIOR_NASAL_RESTRICTION: 0
OD_NORMAL: 1
OS_SUPERIOR_TEMPORAL_RESTRICTION: 0
OD_SUPERIOR_NASAL_RESTRICTION: 0

## 2025-03-31 ASSESSMENT — EXTERNAL EXAM - LEFT EYE: OS_EXAM: NORMAL

## 2025-03-31 ASSESSMENT — EXTERNAL EXAM - RIGHT EYE: OD_EXAM: NORMAL

## 2025-03-31 NOTE — LETTER
3/31/2025         RE:  :  MRN: Amanda Sifuentes  1965  [id number]     Dear Dr. Wilman Marlow,    Thank you for asking me to see your patient, Amanda Sifuentes, for an oculoplastic   consultation.  My assessment and plan are below.  For further details, please see my attached clinic note.      Assessment & Plan     Amanda Sifuentes is a 60 year old female with the following diagnoses:   1. Madarosis of right upper eyelid    2. Madarosis of left upper eyelid      Doing well   Lash growth improved     Gave blepharitis patient info  Return to clinic as needed                  Again, thank you for allowing me to participate in the care of your patient.      Sincerely,    Abdirahman Faust MD  Department of Ophthalmology and Visual Neurosciences  [facility]    CC: Wilman Marlow MD  [address] [facility] [address]  Via In Basket    Delma Mcgrath MD  [address]  [facility] [address]  Via In Basket    Pati Thomas, Amy  [address] [facility] [address]  Via Fax: [phone]    Maritza Rivera PA-C  [address] [facility] [address]  Via Fax: [phone]    Jaqueline Del Cid MD  [address] [facility] [address]  [facility] [address]  Via In Basket    Smiley Cuevas MD  [address] [facility] [address]  Via In Basket    ARMEN Chow CNP  [address]  [facility] [address]  Via In Basket    Douglas Holm MD  [address] [facility] [address]  Via In Basket    Kiersten Tenorio MD  [address] [facility] [address]  [facility] [address]  Via In Basket    Alexus Pate MD  [address] [facility] [address]  Via In Basket

## 2025-03-31 NOTE — PROGRESS NOTES
Chief Complaints and History of Present Illnesses   Patient presents with    Consult For     Amanda Sifuentes is being seen for a consult today by the request of Dr. Marlow due to Madarosis of both upper lids.       Chief Complaint(s) and History of Present Illness(es)     Consult For    In both eyes.  Associated symptoms include Negative for dryness, discharge   and swelling.  Treatments tried include no treatments.  Pain was noted as   0/10. Additional comments: Amanda Sifuentes is being seen for a consult   today by the request of Dr. Marlow due to Madarosis of both upper lids.             Comments    Patient reports over the past 1 year and been dealing with Lash loss on   both upper lids. She reports this condition seem to go back and forth with   either eye. It seem to affected just the midline of lashes losing them   just in the middle. She reports when she reports that 1/15/25 the RUL was   an issues with lash loss. Since that visit she used maxitrol ointment on   both lids but then stopped it this March1st 2025. She states currently she   has the most lashes she has had over the past year.  She hopes this visit will identify the cause of this issue.     No eye medication each eye presently. Does use Patanol PRN each eye with   allergys.   History of BUL Blepharoplasty  each eye 20 years ago. No concerns with   lids position presently.     Sherly Okeefe, COT COT 8:18 AM March 31, 2025              Assessment & Plan     Amanda Sifuentes is a 60 year old female with the following diagnoses:   1. Madarosis of right upper eyelid    2. Madarosis of left upper eyelid      Doing well   Lash growth improved     Gave blepharitis patient info  Return to clinic as needed                    Attending Physician Attestation:  Complete documentation of historical and exam elements from today's encounter can be found in the full encounter summary report (not reduplicated in this progress note).  I personally obtained  the chief complaint(s) and history of present illness.  I confirmed and edited as necessary the review of systems, past medical/surgical history, family history, social history, and examination findings as documented by others; and I examined the patient myself.  I personally reviewed the relevant tests, images, and reports as documented above.  I formulated and edited as necessary the assessment and plan and discussed the findings and management plan with the patient and family. -Abdirahman Faust MD  8:43 AM 3/31/2025

## 2025-03-31 NOTE — TELEPHONE ENCOUNTER
A prior authorization is needed for the following compounded medications prescribed.  Please complete a prior authorization with the information included below.    Medication:NITROGLYCERIN 0.2% in WHITE PET OINTMENT     Ingredients                                                                  NDCs                                                Quantities                       Nitr-o BID ointment                                                   [address]-35                                                 1.5 grams  White pet ointment                                                 [id number]                                              13.5 grams                                                                                                    RX #:[id number]  Reason for Rejection:COMPOUND PLAN EXCLUSION  ZERO INGREDIENTS PASSED  [id number] GENERICS FIRST OR CALL [id number]  .  [id number] DRUG, CONTACT PRESCRI  BARRIE  [id number] EXCLUSION      Pharmacy Insurance plan:Mobile Card/[employer]  BIN #:[id number]  ID #:[id number]  PCN #:[id number]  Phone #:[phone]      Pharmacy NPI:[id number]      Please advise the pharmacy when the prior authorization is approved or denied.     Thank you for your time.      [email or web address]  [place]  [facility]   [address] [place] [address]   Phone: [phone]  Fax: [phone]

## 2025-03-31 NOTE — NURSING NOTE
Chief Complaints and History of Present Illnesses   Patient presents with    Consult For     Amanda MARTÍN Sifuentes is being seen for a consult today by the request of Dr. Marlow due to Madarosis of both upper lids.       Chief Complaint(s) and History of Present Illness(es)       Consult For              Laterality: both eyes    Associated symptoms: Negative for dryness, discharge and swelling    Treatments tried: no treatments    Pain scale: 0/10    Comments: Amanda Sifuentes is being seen for a consult today by the request of Dr. Marlow due to Madarosis of both upper lids.                Comments    Patient reports over the past 1 year and been dealing with Lash loss on both upper lids. She reports this condition seem to go back and forth with either eye. It seem to affected just the midline of lashes losing them just in the middle. She reports when she reports that 1/15/25 the RUL was an issues with lash loss. Since that visit she used maxitrol ointment on both lids but then stopped it this March1st 2025. She states currently she has the most lashes she has had over the past year.  She hopes this visit will identify the cause of this issue.     No eye medication each eye presently. Does use Patanol PRN each eye with allergys.   History of BUL Blepharoplasty  each eye 20 years ago. No concerns with lids position presently.     Sherly Okeefe, COT COT 8:18 AM March 31, 2025

## 2025-03-31 NOTE — PROGRESS NOTES
Chief Complaints and History of Present Illnesses   Patient presents with    Consult For     Amandaviktor Sifuentes is being seen for a consult today by the request of Dr. Marlow due to Madarosis of both upper lids.       Chief Complaint(s) and History of Present Illness(es)     Consult For    In both eyes.  Associated symptoms include Negative for dryness, discharge   and swelling.  Treatments tried include no treatments.  Pain was noted as   0/10. Additional comments: Amanda Sifuentes is being seen for a consult   today by the request of Dr. Marlow due to Madarosis of both upper lids.         Comments    Patient reports over the past 1 year and been dealing with Lash loss on   both upper lids. She reports this condition seem to go back and forth with   either eye. It seem to affected just the midline of lashes losing them   just in the middle. She reports when she reports that 1/15/25 the RUL was   an issues with lash loss. Since that visit she used maxitrol ointment on   both lids but then stopped it this March1st 2025. She states currently she   has the most lashes she has had over the past year.  She hopes this visit will identify the cause of this issue.     No eye medication each eye presently. Does use Patanol PRN each eye with   allergys.   History of BUL Blepharoplasty  each eye 20 years ago. No concerns with   lids position presently.     Sherly Okeefe, COT COT 8:18 AM March 31, 2025        Assessment & Plan     Amanda Sifuentes is a 60 year old female with the following diagnoses:   1. Madarosis of right upper eyelid    2. Madarosis of left upper eyelid       Referred by Dr. Marlow for madarosis - suspect 2/2 blepharitis rather than cancerous etiology. No history of malignant skin cancers removed. Intermittent alternating lash loss for over a year, last in January 2025. Used Maxitrol for 30 days on eyelids, finished March 1st. Washing lids 4x a week, and warm compresses same frequency.     Today no  madarosis, some less dense lashes nasally right upper eyelid. No lesions on eyelid to explain - given improvement on maxitrol, lid hygiene, and warm compresses blepharitis most likely cause for lash loss.    PLAN:  - Can use Maxitrol PRN if have lash loss  - Continue warm compresses  - Continue lid hygiene.  - f/up oculoplastics as needed        Sudeep Arcos MD  PGY-2 Ophthalmology Resident

## 2025-03-31 NOTE — PATIENT INSTRUCTIONS
BLEPHARITIS TREATMENT    Please perform the following:     Eye drops: (if using any medication eye drops wait at least 5 minutes between drops)  -Preservative-free artificial tears 1 drop to both eye(s) 4x/day     -examples include: IVIZIA, Refresh Plus, Systane preservative-free, Thera Tears, as well as those made by your favorite stores (just please make sure they say preservative-free on them and are in separate vials/dropperettes)    -please be aware that many of these can be re-capped, so as to reduce waste of these please check your specific tears type      By mouth:     -Fish oil or Flaxseed oil (Omega-3 fatty acids), 1000mg (1 gram) ONCE a day for 2 weeks, THEN increase to TWICE a day if tolerating    Coromega fish oil packs - [employer]      -taking these with food may help decrease any stomach irritation/discomfort    Other:   -Hot compresses to both eyes for 5-10 minutes TWICE a day     -these can be performed with over-the-counter gel packs, warm water, or other methods such as using a sock with uncooked rice, or a AIDAN MASK   -Lid scrubs twice a day with use of baby shampoo as discussed    -may be better after hot compresses are done to remove any oily debris afterwards

## 2025-03-31 NOTE — PROGRESS NOTES
Chief Complaints and History of Present Illnesses   Patient presents with    Consult For     Amanda Sifuentes is being seen for a consult today by the request of Dr. Marlow due to Madarosis of both upper lids.       Chief Complaint(s) and History of Present Illness(es)     Consult For    In both eyes.  Associated symptoms include Negative for dryness, discharge   and swelling.  Treatments tried include no treatments.  Pain was noted as   0/10. Additional comments: Amanda Sifuentes is being seen for a consult   today by the request of Dr. Marlow due to Madarosis of both upper lids.    Comments    Patient reports over the past 1 year and been dealing with Lash loss on both upper lids. She reports this condition seem to go back and forth with  either eye. It seem to affected just the midline of lashes losing them  just in the middle. She reports when she reports that 1/15/25 the RUL was an issues with lash loss. Since that visit she used maxitrol ointment on both lids but then stopped it this March1st 2025. She states currently she   has the most lashes she has had over the past year. She hopes this visit will identify the cause of this issue.     No eye medication each eye presently. Does use Patanol PRN each eye with allergys.   History of BUL Blepharoplasty  each eye 20 years ago. No concerns with lids position presently.     Sherly Okeefe, COT COT 8:18 AM March 31, 2025        Assessment & Plan     Amanda Sifuentes is a 60 year old female with the following diagnoses:   No diagnosis found.                 Rach Adams MD  Oculoplastic Surgery Fellow

## 2025-03-31 NOTE — NURSING NOTE
Chief Complaints and History of Present Illnesses   Patient presents with    Consult For     Amanda MARTÍN Sifuentes is being seen for a consult today by the request of Dr. Marlow due to Madarosis of both upper lids.       Chief Complaint(s) and History of Present Illness(es)       Consult For              Laterality: both eyes    Associated symptoms: Negative for dryness, discharge and swelling    Treatments tried: no treatments    Pain scale: 0/10    Comments: Amanda Sifuentes is being seen for a consult today by the request of Dr. Marlow due to Madarosis of both upper lids.                Comments    Patient reports over the past 1 year and been dealing with Lash loss on both upper lids. She reports this condition seem to go back and forth with either eye. It seem to affected just the midline of lashes losing them just in the middle. She reports when she reports that 1/15/25 the RUL was an issues with lash loss. Since that visit she used maxitrol ointment on both lids but then stopped it this March1st 2025. She states currently she has the most lashes she has had over the past year.  She hopes this visit will identify the cause of this issue.     Sherly Okeefe, COT COT 8:18 AM March 31, 2025

## 2025-04-01 NOTE — TELEPHONE ENCOUNTER
PA Initiation    Medication: COMPOUNDED NON-CONTROLLED (CMPD RX) - PHARMACY TO MIX COMPOUNDED MEDICATION  Insurance Company: [employer] - Phone [phone] Fax [phone]  Pharmacy Filling the Rx: [facility] - [place], [place] - [address]   Filling Pharmacy Phone: [phone]  Filling Pharmacy Fax:    Start Date: 3/31/2025    Compounded medication - faxed in [place] request form along with clinic notes and ingredients list to [facility] today.     FAX: [phone]

## 2025-04-11 NOTE — TELEPHONE ENCOUNTER
Called [facility] - fax not received - resubmitted again today via CMM.

## 2025-04-14 NOTE — TELEPHONE ENCOUNTER
Received form with question sets in email - faxed back to [facility] today.     FAX: [phone]    Phone: [phone]

## 2025-04-14 NOTE — TELEPHONE ENCOUNTER
PRIOR AUTHORIZATION DENIED    Medication: COMPOUNDED NON-CONTROLLED (CMPD RX) - PHARMACY TO MIX COMPOUNDED MEDICATION  Insurance Company: [employer] - Phone [phone] Fax [phone]  Denial Date: 4/14/2025  Denial Reason(s): SEE BELOW  Appeal Information:         Patient Notified: NO

## 2025-04-16 ENCOUNTER — DOCUMENTATION ONLY (OUTPATIENT)
Dept: OTHER | Facility: CLINIC | Age: 60
End: 2025-04-16
Payer: COMMERCIAL

## 2025-04-17 ENCOUNTER — OFFICE VISIT (OUTPATIENT)
Dept: OBGYN | Facility: OTHER | Age: 60
End: 2025-04-17
Payer: COMMERCIAL

## 2025-04-17 VITALS
HEIGHT: 65 IN | SYSTOLIC BLOOD PRESSURE: 129 MMHG | BODY MASS INDEX: 22.42 KG/M2 | DIASTOLIC BLOOD PRESSURE: 81 MMHG | WEIGHT: 134.6 LBS

## 2025-04-17 DIAGNOSIS — Q51.820 DOUBLE CERVIX: ICD-10-CM

## 2025-04-17 DIAGNOSIS — Z78.0 MENOPAUSE: Primary | ICD-10-CM

## 2025-04-17 RX ORDER — ESTRADIOL 0.5 MG/1
0.5 TABLET ORAL DAILY
Qty: 90 TABLET | Refills: 3 | Status: SHIPPED | OUTPATIENT
Start: 2025-04-17

## 2025-04-17 NOTE — PATIENT INSTRUCTIONS
If you have labs or imaging done, the results will automatically release in [facility] without an interpretation.  Your health care professional will review those results and send an interpretation with recommendations as soon as possible, but this may be 1-3 business days.    If you have any questions regarding your visit, please contact your care team.     [facility]: [phone]  [facility] CLINIC HOURS TELEPHONE NUMBER       MD Laureen Reyes - Certified Medical Assistant     Jenniffer- LEAD RN  Mare-CHELSEY Barraza-CHELSEY Rodriguez-  Dariela-     Monday- [place]  8:00 a.m - 5:00 p.m    Tuesday- Surgery        Thursday- [place]  8:00 a.m - 5:00 p.m.    Friday- [place]  7:30 a.m - 4:00 p.m. [facility]  [address]  [place], [place] [address] [phone] Fax  [phone] Phone  Imaging Scheduling [phone]    [facility] Labor and Delivery  [address] [place] [address] [address].  [place], [place] [address] [phone]    [facility]  [address] [place] [address] [phone] Phone  [phone] Fax  Imaging Scheduling [phone]     Urgent Care locations:  [facility] Monday-Friday  10 am - 8 pm  Saturday and Sunday   9 am - 5 pm  Monday-Friday   10 am - 8 pm  Saturday and Sunday   9 am - 5 pm   [phone]     **Surgeries** Our Surgery Schedulers will contact you to schedule. If you do not receive a call within 3 business days, please call [phone].    If you need a medication refill, please contact your pharmacy. Please allow 3 business days for your refill to be completed.    As always, thank you for trusting us with your healthcare needs!

## 2025-04-17 NOTE — PROGRESS NOTES
OB/GYN      NAME:  Amanda Sifuentes  PCP:  Noreen Ventura  MRN:  [id number]    Impression / Plan     60 year old  with:      ICD-10-CM    1. Menopause  Z78.0 estradiol (ESTRACE) 0.5 MG tablet      2. 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  Refills provided.  She is doing well with the replens, but will reach out if she would like to try the vaginal estrogen cream for genitourinary syndrome of menopause.  Follow up annually, sooner as needed.     Chief Complaint     Chief Complaint   Patient presents with    Gyn Exam       HPI     Amanda Sifuentes is seen for annual gyn exam, menopause.      She has been taking estradiol tablets for hormone therapy.  She would like to continue   Patient's mother continues to use hormone therapy herself.    Patient has not tolerated the estrogen patch in the past.      No LMP recorded (lmp unknown). Patient has had a hysterectomy.     She has been on hormone therapy continuously since menopause.      Problem List     Patient Active Problem List    Diagnosis Date Noted    Other type of migraine 2004     Priority: High     Diagnosis updated by automated process. Provider to review and confirm.      Esophageal reflux 2004     Priority: High    Borderline high cholesterol 2022     Priority: Medium    Thyroid nodule 2020     Priority: Medium    Cervical cancer screening      Priority: Medium     Date  Cervix A  Cervix B  12 NIL pap, neg HR HPV // NIL pap, neg HR HPV.  2/9/15  NIL pap, neg HR HPV. //  NIL pap, neg HR HPV. Plan: cotest in 5 years  18 NIL pap, neg HR HPV //  NIL pap, neg HR HPV. Plan: pap in 3 years.  7/15/21 NIL pap, neg HR HPV //  NIL pap, neg HR HPV. Plan: Cotest in 5 years.  23 NIL pap, neg HR HPV // NIL pap, neg HR HPV. Plan: routine screening  24 NIL pap, neg HPV // NIL pap, neg HPV      Thyroid with heterogeneous echotexture  determined by ultrasound 11/22/2016     Priority: Medium    Thyroid cyst 11/22/2016     Priority: Medium    History of irradiation, presenting hazards to health 11/22/2016     Priority: Medium    Double cervix 10/31/2016     Priority: Medium    Porokeratosis 02/10/2015     Priority: Medium    Actinic keratosis 02/09/2015     Priority: Medium    Hormone replacement therapy (postmenopausal) 12/30/2012     Priority: Medium    Hyperlipidemia LDL goal <160 12/27/2012     Priority: Medium    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     Priority: Medium    Solar elastosis 07/07/2012     Priority: Medium    AK (actinic keratosis) 07/07/2012     Priority: Medium    History of hysterectomy for benign disease 12/27/2011     Priority: Medium     She had a supra-cervical hysterectomy of a bicornate uterus       Eczema 12/27/2011     Priority: Medium    Family history of colon cancer 12/29/2010     Priority: Medium     Grandparents (2) ages late 60's, and early 70's      CARDIOVASCULAR SCREENING; LDL GOAL LESS THAN 160 05/09/2010     Priority: Medium    Other acne 12/07/2007     Priority: Medium    Hemorrhoids 11/23/2004     Priority: Medium     Problem list name updated by automated process. Provider to review         Medications     Current Outpatient Medications   Medication Sig Dispense Refill    ascorbic acid (VITAMIN C) 1000 MG TABS Take 1 tablet by mouth daily.      aspirin 81 MG EC tablet Take 81 mg by mouth daily      Calcium Carb-Cholecalciferol 600-800 MG-UNIT TABS Dose unknown      cimetidine (TAGAMET) 400 MG tablet Take 1 tablet (400 mg) by mouth at bedtime 90 tablet 3    desonide (DESOWEN) 0.05 % external ointment Apply thin layer to affected area BID. 30 g 3    estradiol (ESTRACE) 0.5 MG tablet Take 1 tablet (0.5 mg) by mouth daily. 90 tablet 3    GLUCOSAMINE CHONDROITIN OR TABS TAKE 3 TABLETS (1500MG-GLUCOSAMINE) DAILY 300 tab 3    MULTI-VITAMIN OR TABS 1 TABLET DAILY 35 0    nitroGLYcerin (NITRO-BID) 2 % OINT ointment  "3-4 times a day. Use with 9 parts petroleum and apply to hemmorhoids.(PLEASE MIX FOR PATIENT) 15 g 1    omega-3 fatty acids 1200 MG capsule Take 1 capsule by mouth daily.      omeprazole (PRILOSEC) 40 MG DR capsule Take 1 capsule (40 mg) by mouth daily 90 capsule 3    SUMAtriptan (IMITREX) 25 MG tablet TAKE 1 TABLET BY MOUTH AS NEEDED AS DIRECTED 12 tablet 2    vitamin B complex with vitamin C (STRESS TAB) tablet Take 1 tablet by mouth daily      vitamin E 400 UNIT capsule Take 1 capsule by mouth daily.      atorvastatin (LIPITOR) 20 MG tablet Take 1 tablet (20 mg) by mouth daily. (Patient not taking: Reported on 4/17/2025) 90 tablet 1    estradiol (ESTRACE) 0.1 MG/GM vaginal cream Place 2 g vaginally twice a week. (Patient not taking: Reported on 4/17/2025) 42.5 g 4    metroNIDAZOLE (METROCREAM) 0.75 % external cream Apply topically 2 times daily (Patient not taking: Reported on 4/17/2025) 45 g 3     No current facility-administered medications for this visit.        Allergies     No Known Allergies    ROS     Pertinent positives and negatives are listed in the HPI.     Physical Exam   Vitals: /81   Ht 1.651 m (5' 5\")   Wt 61.1 kg (134 lb 9.6 oz)   LMP  (LMP Unknown)   BMI 22.40 kg/m      General: Comfortable, no obvious distress, normal body habitus  Breast:  symmetrical.  No discrete lesions, no nipple discharge. No lymphadenopathy  Psych: Alert and orientated x 3. Appropriate affect, good insight.   : Normal female external genitalia.  No lesions.  Urethral meatus normal.  Speculum exam reveals a normal vaginal vault x2, normal cervix x2.  Longitudinal septum appreciated. No abnormal discharge.  Bimanual exam reveals no palpable masses.    Uterine corpus surgically absent          Labs/Imaging       I have personally reviewed the labs/imaging and the findings were:    The 10-year ASCVD risk score (Mack CAMPOS, et al., 2019) is: 4%    Values used to calculate the score:      Age: 60 years      Sex: " Female      Is Non- : No      Diabetic: No      Tobacco smoker: No      Systolic Blood Pressure: 129 mmHg      Is BP treated: No      HDL Cholesterol: 84 mg/dL      Total Cholesterol: 272 mg/dL       Smiley Cuevas MD

## 2025-04-17 NOTE — TELEPHONE ENCOUNTER
Please let patient know that her insurance will not cover this prescription due to this being for topical use.      If she has tried other therapies for this condition (hemorrhoids) without success, let us know.

## 2025-04-19 DIAGNOSIS — I25.10 CORONARY ARTERY CALCIFICATION SEEN ON CAT SCAN: ICD-10-CM

## 2025-04-19 DIAGNOSIS — Z82.49 FAMILY HISTORY OF CORONARY ARTERY DISEASE: ICD-10-CM

## 2025-04-21 RX ORDER — ATORVASTATIN CALCIUM 20 MG/1
20 TABLET, FILM COATED ORAL DAILY
Qty: 90 TABLET | Refills: 0 | Status: SHIPPED | OUTPATIENT
Start: 2025-04-21

## 2025-05-05 ENCOUNTER — TELEPHONE (OUTPATIENT)
Dept: OPHTHALMOLOGY | Facility: CLINIC | Age: 60
End: 2025-05-05
Payer: COMMERCIAL

## 2025-05-05 DIAGNOSIS — H02.724: ICD-10-CM

## 2025-05-05 DIAGNOSIS — H02.721: Primary | ICD-10-CM

## 2025-05-05 NOTE — TELEPHONE ENCOUNTER
Health Call Center    Phone Message    May a detailed message be left on voicemail: yes     Reason for Call: Medication Question or concern regarding medication   Prescription Clarification  Name of Medication: Maxitrol ointment  Prescribing Provider: Dr. Marlow   Pharmacy: [facility]   What on the order needs clarification? Ghada from [facility] is calling stating [facility] this rx for the pt on 03/14.  Pt is trying to get a refill, please call Ghada back at [facility] to discuss at [phone]      Action Taken: Message routed to:  [facility] ([facility] eye    Travel Screening: Not Applicable     Date of Service:

## 2025-05-06 RX ORDER — NEOMYCIN SULFATE, POLYMYXIN B SULFATE, AND DEXAMETHASONE 3.5; 10000; 1 MG/G; [USP'U]/G; MG/G
OINTMENT OPHTHALMIC
Qty: 3.5 G | Refills: 2 | Status: SHIPPED | OUTPATIENT
Start: 2025-05-06

## 2025-05-06 NOTE — TELEPHONE ENCOUNTER
Rx for [place]/poly/dex ointment sent for as needed use per request.    Elbert Collins RN 7:39 AM 05/06/25

## 2025-07-11 ENCOUNTER — MYC MEDICAL ADVICE (OUTPATIENT)
Dept: FAMILY MEDICINE | Facility: CLINIC | Age: 60
End: 2025-07-11
Payer: COMMERCIAL

## 2025-07-11 NOTE — TELEPHONE ENCOUNTER
"Patient scheduled for [facility] visit on 7/14 for \"Vaccines (TDAP mentioned)\". Sent patient MyChart to let her know she is not due until 2033 and if she is wanting other vaccines she is due for.  "

## 2025-07-14 ENCOUNTER — ALLIED HEALTH/NURSE VISIT (OUTPATIENT)
Dept: FAMILY MEDICINE | Facility: CLINIC | Age: 60
End: 2025-07-14
Payer: COMMERCIAL

## 2025-07-14 VITALS — TEMPERATURE: 98.4 F

## 2025-07-14 DIAGNOSIS — Z23 ENCOUNTER FOR IMMUNIZATION: Primary | ICD-10-CM

## 2025-07-14 PROCEDURE — 99207 PR NO CHARGE NURSE ONLY: CPT

## 2025-07-14 PROCEDURE — 90678 RSV VACC PREF BIVALENT IM: CPT

## 2025-07-14 PROCEDURE — 90472 IMMUNIZATION ADMIN EACH ADD: CPT

## 2025-07-14 PROCEDURE — 90677 PCV20 VACCINE IM: CPT

## 2025-07-14 PROCEDURE — 90471 IMMUNIZATION ADMIN: CPT

## 2025-07-14 NOTE — PROGRESS NOTES
Prior to immunization administration, verified patients identity using patient s name and date of birth. Please see Immunization Activity for additional information.     Is the patient's temperature normal (100.5 or less)? Yes     Patient MEETS CRITERIA. PROCEED with vaccine administration.        7/14/2025   General Questionnaire    Do you have any questions for your care team about the vaccines you will be receiving today? no             7/14/2025   Pneumococcal   Have you had a serious reaction to a pneumonia, diphtheria, or MMR vaccine or to something in these vaccines? No         Patient MEETS CRITERIA. PROCEED with vaccine administration.          7/14/2025   RSV   Have you had a serious reaction to the RSV vaccine or something in the RSV vaccine (like polysorbate)? No         Patient MEETS CRITERIA. PROCEED with vaccine administration.      Patient instructed to remain in clinic for 15 minutes afterwards, and to report any adverse reactions.      Link to Ancillary Visit Immunization Standing Orders SmartSet     Screening performed by Izzy Santiago CMA on 7/14/2025 at 3:28 PM.    Izzy Santiago CMA ([facility])

## 2025-07-17 ENCOUNTER — LAB (OUTPATIENT)
Dept: LAB | Facility: CLINIC | Age: 60
End: 2025-07-17
Payer: COMMERCIAL

## 2025-07-17 DIAGNOSIS — E78.5 HYPERLIPIDEMIA LDL GOAL <160: ICD-10-CM

## 2025-07-17 DIAGNOSIS — Z11.1 SCREENING FOR TUBERCULOSIS: ICD-10-CM

## 2025-07-17 DIAGNOSIS — Z79.899 ENCOUNTER FOR LONG-TERM (CURRENT) USE OF MEDICATIONS: ICD-10-CM

## 2025-07-17 DIAGNOSIS — Z13.220 LIPID SCREENING: ICD-10-CM

## 2025-07-17 LAB
ALBUMIN SERPL BCG-MCNC: 4.3 G/DL (ref 3.5–5.2)
ALP SERPL-CCNC: 65 U/L (ref 40–150)
ALT SERPL W P-5'-P-CCNC: 18 U/L (ref 0–50)
ANION GAP SERPL CALCULATED.3IONS-SCNC: 8 MMOL/L (ref 7–15)
AST SERPL W P-5'-P-CCNC: 30 U/L (ref 0–45)
BASOPHILS # BLD AUTO: 0.1 10E3/UL (ref 0–0.2)
BASOPHILS NFR BLD AUTO: 2 %
BILIRUB SERPL-MCNC: 0.4 MG/DL
BUN SERPL-MCNC: 16.1 MG/DL (ref 8–23)
CALCIUM SERPL-MCNC: 9.6 MG/DL (ref 8.8–10.4)
CHLORIDE SERPL-SCNC: 104 MMOL/L (ref 98–107)
CHOLEST SERPL-MCNC: 178 MG/DL
CREAT SERPL-MCNC: 0.75 MG/DL (ref 0.51–0.95)
EGFRCR SERPLBLD CKD-EPI 2021: 90 ML/MIN/1.73M2
EOSINOPHIL # BLD AUTO: 0.1 10E3/UL (ref 0–0.7)
EOSINOPHIL NFR BLD AUTO: 4 %
ERYTHROCYTE [DISTWIDTH] IN BLOOD BY AUTOMATED COUNT: 12.3 % (ref 10–15)
FASTING STATUS PATIENT QL REPORTED: YES
FASTING STATUS PATIENT QL REPORTED: YES
GLUCOSE SERPL-MCNC: 93 MG/DL (ref 70–99)
HCO3 SERPL-SCNC: 25 MMOL/L (ref 22–29)
HCT VFR BLD AUTO: 39.6 % (ref 35–47)
HDLC SERPL-MCNC: 79 MG/DL
HGB BLD-MCNC: 13.6 G/DL (ref 11.7–15.7)
IMM GRANULOCYTES # BLD: 0 10E3/UL
IMM GRANULOCYTES NFR BLD: 0 %
LDLC SERPL CALC-MCNC: 90 MG/DL
LYMPHOCYTES # BLD AUTO: 1.1 10E3/UL (ref 0.8–5.3)
LYMPHOCYTES NFR BLD AUTO: 34 %
MCH RBC QN AUTO: 30.7 PG (ref 26.5–33)
MCHC RBC AUTO-ENTMCNC: 34.3 G/DL (ref 31.5–36.5)
MCV RBC AUTO: 89 FL (ref 78–100)
MONOCYTES # BLD AUTO: 0.4 10E3/UL (ref 0–1.3)
MONOCYTES NFR BLD AUTO: 14 %
NEUTROPHILS # BLD AUTO: 1.5 10E3/UL (ref 1.6–8.3)
NEUTROPHILS NFR BLD AUTO: 46 %
NONHDLC SERPL-MCNC: 99 MG/DL
PLATELET # BLD AUTO: 257 10E3/UL (ref 150–450)
POTASSIUM SERPL-SCNC: 4.5 MMOL/L (ref 3.4–5.3)
PROT SERPL-MCNC: 7 G/DL (ref 6.4–8.3)
RBC # BLD AUTO: 4.43 10E6/UL (ref 3.8–5.2)
SODIUM SERPL-SCNC: 137 MMOL/L (ref 135–145)
TRIGL SERPL-MCNC: 47 MG/DL
WBC # BLD AUTO: 3.2 10E3/UL (ref 4–11)

## 2025-07-19 LAB
GAMMA INTERFERON BACKGROUND BLD IA-ACNC: 0.05 IU/ML
M TB IFN-G BLD-IMP: NEGATIVE
M TB IFN-G CD4+ BCKGRND COR BLD-ACNC: 9.95 IU/ML
MITOGEN IGNF BCKGRD COR BLD-ACNC: 0 IU/ML
MITOGEN IGNF BCKGRD COR BLD-ACNC: 0.01 IU/ML
QUANTIFERON MITOGEN: 10 IU/ML
QUANTIFERON NIL TUBE: 0.05 IU/ML
QUANTIFERON TB1 TUBE: 0.06 IU/ML
QUANTIFERON TB2 TUBE: 0.05

## 2025-08-02 DIAGNOSIS — Z82.49 FAMILY HISTORY OF CORONARY ARTERY DISEASE: ICD-10-CM

## 2025-08-02 DIAGNOSIS — I25.10 CORONARY ARTERY CALCIFICATION SEEN ON CAT SCAN: ICD-10-CM

## 2025-08-04 RX ORDER — ATORVASTATIN CALCIUM 20 MG/1
20 TABLET, FILM COATED ORAL DAILY
Qty: 90 TABLET | Refills: 0 | Status: SHIPPED | OUTPATIENT
Start: 2025-08-04 | End: 2025-08-05

## 2025-08-05 ENCOUNTER — TELEPHONE (OUTPATIENT)
Dept: FAMILY MEDICINE | Facility: CLINIC | Age: 60
End: 2025-08-05

## 2025-08-05 ENCOUNTER — ANCILLARY PROCEDURE (OUTPATIENT)
Dept: ULTRASOUND IMAGING | Facility: CLINIC | Age: 60
End: 2025-08-05
Attending: FAMILY MEDICINE
Payer: COMMERCIAL

## 2025-08-05 ENCOUNTER — OFFICE VISIT (OUTPATIENT)
Dept: FAMILY MEDICINE | Facility: CLINIC | Age: 60
End: 2025-08-05
Payer: COMMERCIAL

## 2025-08-05 ENCOUNTER — ANCILLARY PROCEDURE (OUTPATIENT)
Dept: MAMMOGRAPHY | Facility: CLINIC | Age: 60
End: 2025-08-05
Attending: FAMILY MEDICINE
Payer: COMMERCIAL

## 2025-08-05 VITALS
WEIGHT: 134.19 LBS | TEMPERATURE: 97.3 F | RESPIRATION RATE: 16 BRPM | OXYGEN SATURATION: 100 % | SYSTOLIC BLOOD PRESSURE: 122 MMHG | BODY MASS INDEX: 22.36 KG/M2 | HEIGHT: 65 IN | HEART RATE: 78 BPM | DIASTOLIC BLOOD PRESSURE: 80 MMHG

## 2025-08-05 DIAGNOSIS — L70.0 ACNE VULGARIS: ICD-10-CM

## 2025-08-05 DIAGNOSIS — N63.11 BREAST LUMP ON RIGHT SIDE AT 11 O'CLOCK POSITION: ICD-10-CM

## 2025-08-05 DIAGNOSIS — Z82.49 FAMILY HISTORY OF CORONARY ARTERY DISEASE: ICD-10-CM

## 2025-08-05 DIAGNOSIS — Z79.890 HORMONE REPLACEMENT THERAPY (POSTMENOPAUSAL): ICD-10-CM

## 2025-08-05 DIAGNOSIS — I25.10 CORONARY ARTERY CALCIFICATION SEEN ON CAT SCAN: ICD-10-CM

## 2025-08-05 DIAGNOSIS — K64.9 HEMORRHOIDS, UNSPECIFIED HEMORRHOID TYPE: ICD-10-CM

## 2025-08-05 DIAGNOSIS — G43.009 MIGRAINE WITHOUT AURA AND WITHOUT STATUS MIGRAINOSUS, NOT INTRACTABLE: ICD-10-CM

## 2025-08-05 DIAGNOSIS — Z00.00 ROUTINE GENERAL MEDICAL EXAMINATION AT A HEALTH CARE FACILITY: Primary | ICD-10-CM

## 2025-08-05 DIAGNOSIS — K21.9 GASTROESOPHAGEAL REFLUX DISEASE WITHOUT ESOPHAGITIS: ICD-10-CM

## 2025-08-05 PROCEDURE — G0279 TOMOSYNTHESIS, MAMMO: HCPCS | Mod: GC | Performed by: STUDENT IN AN ORGANIZED HEALTH CARE EDUCATION/TRAINING PROGRAM

## 2025-08-05 PROCEDURE — 3079F DIAST BP 80-89 MM HG: CPT | Performed by: FAMILY MEDICINE

## 2025-08-05 PROCEDURE — 3074F SYST BP LT 130 MM HG: CPT | Performed by: FAMILY MEDICINE

## 2025-08-05 PROCEDURE — 1126F AMNT PAIN NOTED NONE PRSNT: CPT | Performed by: FAMILY MEDICINE

## 2025-08-05 PROCEDURE — G2211 COMPLEX E/M VISIT ADD ON: HCPCS | Performed by: FAMILY MEDICINE

## 2025-08-05 PROCEDURE — 99396 PREV VISIT EST AGE 40-64: CPT | Performed by: FAMILY MEDICINE

## 2025-08-05 PROCEDURE — 99214 OFFICE O/P EST MOD 30 MIN: CPT | Mod: 25 | Performed by: FAMILY MEDICINE

## 2025-08-05 PROCEDURE — 77066 DX MAMMO INCL CAD BI: CPT | Mod: GC | Performed by: STUDENT IN AN ORGANIZED HEALTH CARE EDUCATION/TRAINING PROGRAM

## 2025-08-05 RX ORDER — OMEPRAZOLE 40 MG/1
40 CAPSULE, DELAYED RELEASE ORAL DAILY
Qty: 90 CAPSULE | Refills: 3 | Status: SHIPPED | OUTPATIENT
Start: 2025-08-05

## 2025-08-05 RX ORDER — ATORVASTATIN CALCIUM 20 MG/1
20 TABLET, FILM COATED ORAL DAILY
Qty: 90 TABLET | Refills: 3 | Status: SHIPPED | OUTPATIENT
Start: 2025-08-05

## 2025-08-05 RX ORDER — NITROGLYCERIN 20 MG/G
OINTMENT TOPICAL
Qty: 15 G | Refills: 1 | Status: SHIPPED | OUTPATIENT
Start: 2025-08-05

## 2025-08-05 RX ORDER — CIMETIDINE 400 MG
400 TABLET ORAL AT BEDTIME
Qty: 90 TABLET | Refills: 3 | Status: SHIPPED | OUTPATIENT
Start: 2025-08-05

## 2025-08-05 RX ORDER — SUMATRIPTAN SUCCINATE 25 MG/1
TABLET ORAL
Qty: 12 TABLET | Refills: 2 | Status: SHIPPED | OUTPATIENT
Start: 2025-08-05

## 2025-08-05 RX ORDER — DESONIDE 0.5 MG/G
OINTMENT TOPICAL
Qty: 30 G | Refills: 3 | Status: SHIPPED | OUTPATIENT
Start: 2025-08-05

## 2025-08-05 SDOH — HEALTH STABILITY: PHYSICAL HEALTH: ON AVERAGE, HOW MANY DAYS PER WEEK DO YOU ENGAGE IN MODERATE TO STRENUOUS EXERCISE (LIKE A BRISK WALK)?: 3 DAYS

## 2025-08-05 SDOH — HEALTH STABILITY: PHYSICAL HEALTH: ON AVERAGE, HOW MANY MINUTES DO YOU ENGAGE IN EXERCISE AT THIS LEVEL?: 40 MIN

## 2025-08-05 ASSESSMENT — PAIN SCALES - GENERAL: PAINLEVEL_OUTOF10: NO PAIN (0)

## 2025-08-05 ASSESSMENT — SOCIAL DETERMINANTS OF HEALTH (SDOH): HOW OFTEN DO YOU GET TOGETHER WITH FRIENDS OR RELATIVES?: ONCE A WEEK

## 2025-08-08 ENCOUNTER — TELEPHONE (OUTPATIENT)
Dept: FAMILY MEDICINE | Facility: CLINIC | Age: 60
End: 2025-08-08
Payer: COMMERCIAL

## 2025-08-08 DIAGNOSIS — K60.2 RECTAL FISSURE: Primary | ICD-10-CM

## 2025-08-19 RX ORDER — NITROGLYCERIN 4 MG/G
1 OINTMENT RECTAL EVERY 12 HOURS
Qty: 30 G | Refills: 1 | Status: SHIPPED | OUTPATIENT
Start: 2025-08-19

## (undated) DEVICE — KIT ENDO FIRST STEP DISINFECTANT 200ML W/POUCH EP-4

## (undated) DEVICE — PREP CHLORAPREP 26ML TINTED ORANGE  260815

## (undated) DEVICE — PAD CHUX UNDERPAD 23X24" 7136

## (undated) RX ORDER — FENTANYL CITRATE 50 UG/ML
INJECTION, SOLUTION INTRAMUSCULAR; INTRAVENOUS
Status: DISPENSED
Start: 2019-01-11

## (undated) RX ORDER — FENTANYL CITRATE 50 UG/ML
INJECTION, SOLUTION INTRAMUSCULAR; INTRAVENOUS
Status: DISPENSED
Start: 2024-10-11